# Patient Record
Sex: FEMALE | Race: BLACK OR AFRICAN AMERICAN | NOT HISPANIC OR LATINO | Employment: PART TIME | URBAN - METROPOLITAN AREA
[De-identification: names, ages, dates, MRNs, and addresses within clinical notes are randomized per-mention and may not be internally consistent; named-entity substitution may affect disease eponyms.]

---

## 2018-05-11 ENCOUNTER — TRANSFERRED RECORDS (OUTPATIENT)
Dept: HEALTH INFORMATION MANAGEMENT | Facility: CLINIC | Age: 38
End: 2018-05-11

## 2018-11-01 ENCOUNTER — TRANSFERRED RECORDS (OUTPATIENT)
Dept: HEALTH INFORMATION MANAGEMENT | Facility: CLINIC | Age: 38
End: 2018-11-01

## 2018-11-01 ENCOUNTER — OFFICE VISIT (OUTPATIENT)
Dept: FAMILY MEDICINE | Facility: CLINIC | Age: 38
End: 2018-11-01
Payer: OTHER MISCELLANEOUS

## 2018-11-01 VITALS
RESPIRATION RATE: 16 BRPM | WEIGHT: 137 LBS | BODY MASS INDEX: 23.39 KG/M2 | SYSTOLIC BLOOD PRESSURE: 101 MMHG | TEMPERATURE: 97.9 F | HEIGHT: 64 IN | DIASTOLIC BLOOD PRESSURE: 69 MMHG | HEART RATE: 70 BPM | OXYGEN SATURATION: 98 %

## 2018-11-01 DIAGNOSIS — E55.9 VITAMIN D DEFICIENCY: ICD-10-CM

## 2018-11-01 DIAGNOSIS — L70.0 CYSTIC ACNE: ICD-10-CM

## 2018-11-01 DIAGNOSIS — M54.50 CHRONIC RIGHT-SIDED LOW BACK PAIN WITHOUT SCIATICA: Primary | ICD-10-CM

## 2018-11-01 DIAGNOSIS — G89.29 CHRONIC RIGHT-SIDED LOW BACK PAIN WITHOUT SCIATICA: Primary | ICD-10-CM

## 2018-11-01 DIAGNOSIS — H57.89 REDNESS OF RIGHT EYE: ICD-10-CM

## 2018-11-01 LAB
ANION GAP SERPL CALCULATED.3IONS-SCNC: 11 MMOL/L (ref 5–18)
BUN SERPL-MCNC: 4 MG/DL (ref 8–22)
CALCIUM SERPL-MCNC: 9.4 MG/DL (ref 8.5–10.5)
CHLORIDE SERPL-SCNC: 108 MMOL/L (ref 98–107)
CO2 SERPL-SCNC: 22 MMOL/L (ref 22–31)
CREAT SERPL-MCNC: 0.61 MG/DL (ref 0.6–1.1)
GLUCOSE SERPL-MCNC: 85 MG/DL (ref 70–125)
POTASSIUM SERPL-SCNC: 3.8 MMOL/L (ref 3.5–5)
SODIUM SERPL-SCNC: 141 MMOL/L (ref 136–145)

## 2018-11-01 RX ORDER — ACETAMINOPHEN 500 MG
500-1000 TABLET ORAL EVERY 8 HOURS PRN
Qty: 100 TABLET | Refills: 11 | Status: ON HOLD | OUTPATIENT
Start: 2018-11-01 | End: 2020-01-02

## 2018-11-01 RX ORDER — HYDROCORTISONE 2.5 %
CREAM (GRAM) TOPICAL
COMMUNITY
Start: 2018-11-01 | End: 2023-02-01

## 2018-11-01 RX ORDER — CEFUROXIME AXETIL 250 MG/1
250 TABLET ORAL 2 TIMES DAILY
COMMUNITY
End: 2018-11-13

## 2018-11-01 NOTE — LETTER
RETURN TO WORK/SCHOOL FORM    11/1/2018    Re: Sagrario Meyer  1980      To Whom It May Concern:     Sagrario Meyer was seen in clinic today..  She may return to work with restrictions on 11/2/18          Restrictions:  limited to light duty - lifting and pulling no greater than 20 pounds for 2 weeks.      Dee Webb MD  11/1/2018 12:01 PM

## 2018-11-01 NOTE — PROGRESS NOTES
"    There are no exam notes on file for this visit.  Chief Complaint   Patient presents with     Back Pain     pain starts on R side of hip then would go across lower back. States that the pain comes and goes. Was in severe pain on Monday.     Blood pressure 101/69, pulse 70, temperature 97.9  F (36.6  C), temperature source Oral, resp. rate 16, height 5' 4\" (162.6 cm), weight 137 lb (62.1 kg), SpO2 98 %.                 HPI     Sagrario Meyer is a 37 year old  female with a PMH significant for:     Patient Active Problem List   Diagnosis     Cystic acne     Latent tuberculosis     Redness of right eye     Chronic right-sided low back pain without sciatica     Vitamin D deficiency     She presents as a new patient to clinic with main concern of right side pain.  She points to her right gluteal region as to where the pain is bothering her.  It has gotten worse over the last 2 weeks.  She does note that she had an injury to this area in 2012.  After that injury she did recover fully and has not had any pain since that time.  She has no new injury that she is aware of recently.  She does state that the pain got a little bit worse at her job when she lifted a heavy person on Monday.  It was the most severe on Monday.  She is clear that the injury did not occur at work but that work activities just made it worse.  She at times has a little pain down her thigh but this is very rare.  No weakness numbness tingling.  No problems with urination or stool.  No fevers or chills.  Pain is worse with movement does not wake her from sleep.  Also gets stiff when sitting at work and stretching helps.  She has tried some heat and ice already which helps.  Massage also helps.  She does not remember having any imaging her of her back or hips in the past.  She has not had physical therapy in the past.  She is worried about a kidney issue or appendix.    Getting  in January in [facility]". Wondering about future travel visit for " "this.    Would like a physical and blood draw.  She is fasting today.  As above.    Referred from Linh Jackman, she is her friend.  Previously seen at [facility].  She has not been seen there regularly.     Chronic skin issuses sees dermatology. Face has severe acne with scars.  Right foot has a severe rash with skin peeling.  Has two cream for face and one cream for right foot only. Sees dermatology consultants. Last saw in May.  Dr. Phill Kwan.    [facility]. Dr. Chaitanya Simpson, Danny Nguyen.  For right eye redness and pain, irritation that has been recurrent.  Just got eye drops that has not filled. Recheck in 11/9.     Came to [place] in [place], she is immigrant.  Won the [employer], not a refugee.  Speaks Macedonian and Oromo. Orthodoxy.  Lives alone.  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   2 brothers, one sister all in [place]. Mother still living in [place] as well.  Father's passed away and she never knew him.      Problem list/ PMH updated and review in detail   PAST SURGICAL HISTORY: none  Family History   Problem Relation Age of Onset     KIDNEY DISEASE Mother      No Known Problems Father      No Known Problems Sister      No Known Problems Brother      Hypertension Maternal Grandmother      No Known Problems Brother      Coronary Artery Disease No family hx of      Other Cancer No family hx of      Diabetes No family hx of      As above, otherwise negative for 12 point ROS.              Physical Exam:     Vitals:    11/01/18 1109   BP: 101/69   Pulse: 70   Resp: 16   Temp: 97.9  F (36.6  C)   TempSrc: Oral   SpO2: 98%   Weight: 137 lb (62.1 kg)   Height: 5' 4\" (162.6 cm)     Body mass index is 23.52 kg/(m^2).    Exam:  Constitutional: healthy, alert and no distress  Eyes: PERRLA, EOMI, conjunctiva very erythematous on the right. No discharge.  Cardiovascular:RRR. No murmurs, clicks gallops or rub  Respiratory:  normal respiratory rate " and rhythm, lungs clear to auscultation. No wheezes or crackles.  Abdomen: +BS, soft, nontender, nondistended. No HSM.  Extremities: No C/C/E in BLE. 2+DP pulses.  Joint exam without erythema, effusion and full ROM throughout of hips and knees.  Back: Full ROM. Tender to palpation in right lumbar paraspinal muscles and gluteus muscles to deep palpation.  Negative straight leg raises bilaterally.  Normal sensation to light touch.  5/5 strength in bilateral lower extremities. Neg NICOLA.  Skin: Severe acne on the face, has a lot of scarring but inflammation appears quiet and well controlled at this time.  Psychiatric: mentation appears normal and affect normal/bright      Assessment and Plan     Sagrario was seen today for back pain.    Diagnoses and all orders for this visit:    Chronic right-sided low back pain without sciatica: Chronic issues for a few years with a recent flare.  Discussed supportive cares and exercises to try.  Consider formal PT if not improving.  Gave work note to limit her lifting and pulling responsibilities while healing. Reassured that not a kidney or appendix issue.  -     Basic Metabolic Panel ([facility])  -     acetaminophen (TYLENOL) 500 MG tablet; Take 1-2 tablets (500-1,000 mg) by mouth every 8 hours as needed for mild pain    Redness of right eye: Seeing ophthalmology.  NATHALIE signed and will add medications to list at next visit.    Cystic acne: Severe, with scarring on face.  Under dermatology care, but has not seen them since the spring.  Unsure of all medications. NATHALIE signed. Also has chronic rash on right foot, unclear diagnosis.  Will review derm records.    Vitamin D deficiency: Very low last year. Recheck records.  -     Vitamin D 25-Hydroxy ([facility])      Patient Instructions   Rest, heat, ice and stretching and strengthening exercises.    Get up at work and stretch periodically.    Do exercises 2 times a day.       Options for treatment and/or follow-up care were reviewed with the  patient. Sagrario Rivasdulce was engaged and actively involved in the decision making process. She verbalized understanding of the options discussed and was satisfied with the final plan.    Dee Webb MD

## 2018-11-01 NOTE — PATIENT INSTRUCTIONS
Rest, heat, ice and stretching and strengthening exercises.    Get up at work and stretch periodically.    Do exercises 2 times a day.

## 2018-11-01 NOTE — MR AVS SNAPSHOT
After Visit Summary   2018    Sagrario Meyer    MRN: [id number]           Patient Information     Date Of Birth          1980        Visit Information        Provider Department      2018 11:00 AM Dee Webb MD [facility]        Today's Diagnoses     Chronic right-sided low back pain without sciatica    -  1    Redness of right eye        Cystic acne        Vitamin D deficiency          Care Instructions    Rest, heat, ice and stretching and strengthening exercises.    Get up at work and stretch periodically.    Do exercises 2 times a day.          Follow-ups after your visit        Follow-up notes from your care team     Return in about 2 weeks (around 11/15/2018) for Complete physical and recheck back.      Who to contact     Please call your clinic at [phone] to:    Ask questions about your health    Make or cancel appointments    Discuss your medicines    Learn about your test results    Speak to your doctor            Additional Information About Your Visit        MyChart Information     Becual is an electronic gateway that provides easy, online access to your medical records. With Becual, you can request a clinic appointment, read your test results, renew a prescription or communicate with your care team.     To sign up for [email or web address] visit the website at [email or web address]   You will be asked to enter the access code listed below, as well as some personal information. Please follow the directions to create your username and password.     Your access code is: [email or web address]  Expires: 2019 12:06 PM     Your access code will  in 90 days. If you need help or a new code, please contact your [facility] or call [phone] for assistance.        Care EveryWhere ID     This is your Care EveryWhere ID. This could be used by other organizations to access your [facility] medical records  [id number]        Your Vitals Were      Pulse Temperature Respirations Last Period Pulse Oximetry       70 97.9  F (36.6  C) (Oral) 16 (LMP Unknown) 98%        Blood Pressure from Last 3 Encounters:   11/01/18 101/69    Weight from Last 3 Encounters:   11/01/18 137 lb (62.1 kg)              We Performed the Following     Basic Metabolic Panel ([facility])     Vitamin D 25-Hydroxy ([facility])          Today's Medication Changes          These changes are accurate as of 11/1/18 12:06 PM.  If you have any questions, ask your nurse or doctor.               Start taking these medicines.        Dose/Directions    acetaminophen 500 MG tablet   Commonly known as:  TYLENOL   Used for:  Chronic right-sided low back pain without sciatica   Started by:  Dee Webb MD        Dose:  500-1000 mg   Take 1-2 tablets (500-1,000 mg) by mouth every 8 hours as needed for mild pain   Quantity:  100 tablet   Refills:  11            Where to get your medicines      These medications were sent to [facility]/pharmacy #[id number] - [place], [place] - [address] AT [facility]  [address] [place] [address]    Hours:  24-hours Phone:  [phone]     acetaminophen 500 MG tablet                Primary Care Provider Office Phone # Fax #    Dee Webb [phone]       [address] [place] [address]        Equal Access to Services     BRANDON FRANKS AH: Hadii ulises mina hadasho Soomaali, waaxda luqadaha, qaybta kaalmada adeegyada, daisy franklin haybyron العراقي. So [facility] [place] [phone].    ATENCIÓN: Si habla español, tiene a carbone disposición servicios gratuitos de asistencia lingüística. Adelsoame al [phone].    We comply with applicable federal civil rights laws and [place] laws. We do not discriminate on the basis of race, color, national origin, age, disability, sex, sexual orientation, or gender identity.            Thank you!     Thank you for choosing [facility]  for your care. Our goal is always to provide you with excellent  care. Hearing back from our patients is one way we can continue to improve our services. Please take a few minutes to complete the written survey that you may receive in the mail after your visit with us. Thank you!             Your Updated Medication List - Protect others around you: Learn how to safely use, store and throw away your medicines at www.disposemymeds.org.          This list is accurate as of 11/1/18 12:06 PM.  Always use your most recent med list.                   Brand Name Dispense Instructions for use Diagnosis    acetaminophen 500 MG tablet    TYLENOL    100 tablet    Take 1-2 tablets (500-1,000 mg) by mouth every 8 hours as needed for mild pain    Chronic right-sided low back pain without sciatica       cefuroxime 250 MG tablet    CEFTIN     Take 250 mg by mouth 2 times daily        hydrocortisone 2.5 % cream      Apply to face 2 to 3 times a week. From Dermatology.

## 2018-11-02 LAB — 25(OH)D3 SERPL-MCNC: 10.6 NG/ML (ref 30–80)

## 2018-11-13 ENCOUNTER — OFFICE VISIT (OUTPATIENT)
Dept: FAMILY MEDICINE | Facility: CLINIC | Age: 38
End: 2018-11-13
Payer: COMMERCIAL

## 2018-11-13 VITALS
TEMPERATURE: 97.9 F | RESPIRATION RATE: 16 BRPM | HEIGHT: 64 IN | BODY MASS INDEX: 23.49 KG/M2 | SYSTOLIC BLOOD PRESSURE: 116 MMHG | WEIGHT: 137.6 LBS | DIASTOLIC BLOOD PRESSURE: 77 MMHG | HEART RATE: 72 BPM

## 2018-11-13 DIAGNOSIS — E55.9 VITAMIN D DEFICIENCY: ICD-10-CM

## 2018-11-13 DIAGNOSIS — Z00.00 ROUTINE GENERAL MEDICAL EXAMINATION AT A HEALTH CARE FACILITY: Primary | ICD-10-CM

## 2018-11-13 LAB
CHOLEST SERPL-MCNC: 114 MG/DL (ref 0–200)
CHOLEST/HDLC SERPL: 2.6 {RATIO} (ref 0–5)
HDLC SERPL-MCNC: 44.1 MG/DL
HIV 1+2 AB+HIV1 P24 AG SERPL QL IA: NEGATIVE
LDLC SERPL CALC-MCNC: 56 MG/DL (ref 0–129)
TRIGL SERPL-MCNC: 67.8 MG/DL (ref 0–150)
VLDL CHOLESTEROL: 13.6 MG/DL (ref 7–32)

## 2018-11-13 RX ORDER — CHOLECALCIFEROL (VITAMIN D3) 50 MCG
2000 TABLET ORAL DAILY
Qty: 100 TABLET | Refills: 3 | Status: SHIPPED | OUTPATIENT
Start: 2018-11-13 | End: 2019-01-10

## 2018-11-13 NOTE — LETTER
November 16, 2018      Sagrario Meyer  [address]   [place] [address]        Dear Sagrario,    Please see below for your test results.    Resulted Orders   HIV Ag/Ab Screen [place] ([facility])   Result Value Ref Range    HIV Antigen/Antibody Negative Negative    Narrative    Test performed by:  [facility]  [address] [place] [address]  Method is Abbott HIV Ag/Ab for the detection of HIV p24 antigen, HIV-1   antibodies and HIV-2 antibodies.   Lipid Panel ([facility])   Result Value Ref Range    Cholesterol 114.0 0.0 - 200.0 mg/dL    Cholesterol/HDL Ratio 2.6 0.0 - 5.0    HDL Cholesterol 44.1 >40.0 mg/dL    LDL Cholesterol Calculated 56 0 - 129 mg/dL    Triglycerides 67.8 0.0 - 150.0 mg/dL    VLDL Cholesterol 13.6 7.0 - 32.0 mg/dL     Negative HIV test and cholesterol is very low and normal.      If you have any questions, please call the clinic to make an appointment.    Sincerely,    Dee Webb MD

## 2018-11-13 NOTE — PROGRESS NOTES
Female Physical Note    Concerns today: No special concerns today.     Back and buttock pain still there and will follow up for that in the future.        ROS:  CONSTITUTIONAL: no fatigue, no unexpected change in weight  SKIN: no worrisome rashes, no worrisome moles, no worrisome lesions  EYES: no acute vision problems or changes  ENT: no ear problems, no mouth problems, no throat problems  RESP: no significant cough, no shortness of breath  BREAST: no masses, no tenderness, no discharge  CV: no chest pain, no palpitations, no new or worsening peripheral edema  GI: no nausea, no vomiting, no constipation, no diarrhea  : no frequency, no dysuria, no hematuria  MS: no claudication, no myalgias, no joint aches  NEURO: no weakness, no dizziness, no syncope, no headaches  ENDOCRINE: no temperature intolerance, no skin/hair changes  HEME: no easy bruising, no bleeding problems  PSYCHIATRIC: NEGATIVE for changes in mood or affect    Sexually Active: No  Sexual concerns: No   Contraception:not needed  None   No LMP recorded (lmp unknown). Regular periods.  STD History: Neg  Last Pap Smear Date: Never had Abnormal Pap History: None    Patient Active Problem List   Diagnosis     Cystic acne     Latent tuberculosis     Redness of right eye     Chronic right-sided low back pain without sciatica     Vitamin D deficiency       Current Outpatient Prescriptions   Medication Sig Dispense Refill     acetaminophen (TYLENOL) 500 MG tablet Take 1-2 tablets (500-1,000 mg) by mouth every 8 hours as needed for mild pain 100 tablet 11     cefuroxime (CEFTIN) 250 MG tablet Take 250 mg by mouth 2 times daily       hydrocortisone 2.5 % cream Apply to face 2 to 3 times a week. From Dermatology.         No past medical history on file.     Family History     Problem (# of Occurrences) Relation (Name,Age of Onset)    Hypertension (1) Maternal Grandmother    KIDNEY DISEASE (1) Mother    No Known Problems (4) Father, Sister, Brother, Brother  "      Negative family history of: Coronary Artery Disease, Other Cancer, Diabetes          Problem List Medication List and Allergy List were reviewed.    Patient is an established patient of this clinic..    Social History   Substance Use Topics     Smoking status: Never Smoker     Smokeless tobacco: Never Used     Alcohol use Yes      Comment: occ wine     Single  Children ? no    Has anyone hurt you physically, for example by pushing, hitting, slapping or kicking you or forcing you to have sex? Denies  Do you feel threatened or controlled by a partner, ex-partner or anyone in your life? Denies    RISK BEHAVIORS AND HEALTHY HABITS:  Tobacco Use/Smoking: None  Illicit Drug Use: None  Do you use alcohol? No  Diet (5-7 servings of fruits/veg daily): Yes   Exercise (30 min accumulated most days):Yes  Dental Care: Yes   Calcium 1500 mg/d:  No  Seat Belt Use: Yes     Cholesterol Level (>46 yo or at risk):  Date done normal in 2014, recheck next year, ASA use (>3% risk in 5 y):  Testing not indicated , Pap/HPV cotest every 5 years for women 30-65   Recommended and patient declined testing. and HIV screening:  Recommended and patient accepted testing.    ASCVD risk 0.3%    Immunization History   Administered Date(s) Administered     Influenza (IIV3) PF 10/25/2018    Reviewed Immunization Record Today    EXAMINATION:   /77  Pulse 72  Temp 97.9  F (36.6  C)  Resp 16  Ht 5' 3.75\" (161.9 cm)  Wt 137 lb 9.6 oz (62.4 kg)  LMP  (LMP Unknown)  BMI 23.8 kg/m2  GENERAL: healthy, alert and no distress  EYES: Eyes grossly normal to inspection, extraocular movements - intact, and PERRL  HENT: ear canals- normal; TMs- normal; Nose- normal; Mouth- no ulcers, no lesions  NECK: no tenderness, no adenopathy, no asymmetry, no masses, no stiffness; thyroid- normal to palpation  RESP: lungs clear to auscultation - no rales, no rhonchi, no wheezes  BREAST: no masses, no tenderness, no nipple discharge, no palpable axillary masses " or adenopathy  CV: regular rates and rhythm, normal S1 S2, no S3 or S4 and no murmur, no click or rub -  ABDOMEN: soft, no tenderness, no  hepatosplenomegaly, no masses, normal bowel sounds  MS: extremities- no gross deformities noted, no edema  SKIN: no suspicious lesions, no rashes  NEURO: strength and tone- normal, sensory exam- grossly normal, mentation- intact, speech- normal, reflexes- symmetric  BACK: no CVA tenderness, no paralumbar tenderness  LYMPHATICS: ant. cervical- normal, post. cervical- normal, axillary- normal, supraclavicular- normal, inguinal- normal    ASSESSMENT/PLAN:  Sagrario was seen today for physical and back pain.    Diagnoses and all orders for this visit:    Routine general medical examination at a health care facility: screening tests ordered.  Discussed need for pap smear.  She states she will think about it and may have in the future. Otherwise up to date.  -     HIV Ag/Ab Screen [facility])  -     Lipid Panel ([place])    Vitamin D deficiency: history of vitamin D def.  Will restart supplements and recheck in 6 weeks. Adjust dose as needed.  -     Cholecalciferol (VITAMIN D) 2000 units tablet; Take 2,000 Units by mouth daily      Return in about 3 weeks (around 12/4/2018) for Recheck back pain (Worker's Comp), 3 months for vitamin D recheck.

## 2018-11-13 NOTE — PATIENT INSTRUCTIONS
Preventive Health Recommendations  Female Ages 26 - 39  Yearly exam:   See your health care provider every year in order to    Review health changes.     Discuss preventive care.      Review your medicines if you your doctor has prescribed any.    Until age 30: Get a Pap test every three years (more often if you have had an abnormal result).    After age 30: Talk to your doctor about whether you should have a Pap test every 3 years or have a Pap test with HPV screening every 5 years.   You do not need a Pap test if your uterus was removed (hysterectomy) and you have not had cancer.  You should be tested each year for STDs (sexually transmitted diseases), if you're at risk.   Talk to your provider about how often to have your cholesterol checked.  If you are at risk for diabetes, you should have a diabetes test (fasting glucose).  Shots: Get a flu shot each year. Get a tetanus shot every 10 years.   Nutrition:     Eat at least 5 servings of fruits and vegetables each day.    Eat whole-grain bread, whole-wheat pasta and brown rice instead of white grains and rice.    Get adequate Calcium 1500mg daily. and Vitamin D,     Lifestyle    Exercise at least 150 minutes a week (30 minutes a day, 5 days of the week). This will help you control your weight and prevent disease.    Limit alcohol to one drink per day.    No smoking.     Wear sunscreen to prevent skin cancer.    See your dentist every six months for an exam and cleaning.    Foods and drinks with calcium  Food Calcium, milligrams   Milk (skim, 2 percent, or whole, 8 oz [240 mL]) 300   Yogurt (6 oz [168 g]) 250   Orange juice (with calcium, 8 oz [240 mL]) 300   Tofu with calcium (1/2 cup [113 g]) 435   Cheese (1 oz [28 g]) 195 to 335 (hard cheese = higher calcium)   Cottage cheese (1/2 cup [113 g]) 130   Ice cream or frozen yogurt (1/2 cup [113 g]) 100   Soy milk (8 oz [240 mL]) 300   Beans (1/2 cup cooked [113 g]) 60 to 80   Dark, leafy green vegetables (1/2 cup  cooked [113 g]) 50 to 135   Almonds (24 whole) 70   Orange (1 medium) 60

## 2018-11-13 NOTE — MR AVS SNAPSHOT
After Visit Summary   11/13/2018    Sagrario Meyer    MRN: [id number]           Patient Information     Date Of Birth          1980        Visit Information        Provider Department      11/13/2018 2:30 PM Dee Webb MD [facility]        Today's Diagnoses     Routine general medical examination at a health care facility    -  1    Vitamin D deficiency          Care Instructions      Preventive Health Recommendations  Female Ages 26 - 39  Yearly exam:   See your health care provider every year in order to    Review health changes.     Discuss preventive care.      Review your medicines if you your doctor has prescribed any.    Until age 30: Get a Pap test every three years (more often if you have had an abnormal result).    After age 30: Talk to your doctor about whether you should have a Pap test every 3 years or have a Pap test with HPV screening every 5 years.   You do not need a Pap test if your uterus was removed (hysterectomy) and you have not had cancer.  You should be tested each year for STDs (sexually transmitted diseases), if you're at risk.   Talk to your provider about how often to have your cholesterol checked.  If you are at risk for diabetes, you should have a diabetes test (fasting glucose).  Shots: Get a flu shot each year. Get a tetanus shot every 10 years.   Nutrition:     Eat at least 5 servings of fruits and vegetables each day.    Eat whole-grain bread, whole-wheat pasta and brown rice instead of white grains and rice.    Get adequate Calcium 1500mg daily. and Vitamin D,     Lifestyle    Exercise at least 150 minutes a week (30 minutes a day, 5 days of the week). This will help you control your weight and prevent disease.    Limit alcohol to one drink per day.    No smoking.     Wear sunscreen to prevent skin cancer.    See your dentist every six months for an exam and cleaning.    Foods and drinks with calcium  Food Calcium, milligrams   Milk (skim, 2 percent, or  whole, 8 oz [240 mL]) 300   Yogurt (6 oz [168 g]) 250   Orange juice (with calcium, 8 oz [240 mL]) 300   Tofu with calcium (1/2 cup [113 g]) 435   Cheese (1 oz [28 g]) 195 to 335 (hard cheese = higher calcium)   Cottage cheese (1/2 cup [113 g]) 130   Ice cream or frozen yogurt (1/2 cup [113 g]) 100   Soy milk (8 oz [240 mL]) 300   Beans (1/2 cup cooked [113 g]) 60 to 80   Dark, leafy green vegetables (1/2 cup cooked [113 g]) 50 to 135   Almonds (24 whole) 70   Orange (1 medium) 60               Follow-ups after your visit        Follow-up notes from your care team     Return in about 3 weeks (around 2018) for Recheck back pain (Worker's Comp), 3 months for vitamin D recheck.      Who to contact     Please call your clinic at [phone] to:    Ask questions about your health    Make or cancel appointments    Discuss your medicines    Learn about your test results    Speak to your doctor            Additional Information About Your Visit        [facility] [employer] [facility] Information     [facility] is an electronic gateway that provides easy, online access to your medical records. With [facility], you can request a clinic appointment, read your test results, renew a prescription or communicate with your care team.     To sign up for [facility] visit the website at [email or web address]   You will be asked to enter the access code listed below, as well as some personal information. Please follow the directions to create your username and password.     Your access code is: [email or web address]  Expires: 2019 11:06 AM     Your access code will  in 90 days. If you need help or a new code, please contact your [facility] or call [phone] for assistance.        Care EveryWhere ID     This is your Care EveryWhere ID. This could be used by other organizations to access your [facility] medical records  [id number]        Your Vitals Were     Pulse Temperature Respirations Height Last Period BMI (Body Mass  "Index)    72 97.9  F (36.6  C) 16 5' 3.75\" (161.9 cm) (LMP Unknown) 23.8 kg/m2       Blood Pressure from Last 3 Encounters:   11/13/18 116/77   11/01/18 101/69    Weight from Last 3 Encounters:   11/13/18 137 lb 9.6 oz (62.4 kg)   11/01/18 137 lb (62.1 kg)              We Performed the Following     HIV Ag/Ab Screen [place] ([facility])     Lipid Panel ([place])          Today's Medication Changes          These changes are accurate as of 11/13/18  3:35 PM.  If you have any questions, ask your nurse or doctor.               Start taking these medicines.        Dose/Directions    vitamin D 2000 units tablet   Used for:  Vitamin D deficiency   Started by:  Dee Webb MD        Dose:  2000 Units   Take 2,000 Units by mouth daily   Quantity:  100 tablet   Refills:  3         Stop taking these medicines if you haven't already. Please contact your care team if you have questions.     cefuroxime 250 MG tablet   Commonly known as:  CEFTIN   Stopped by:  Dee Webb MD                Where to get your medicines      These medications were sent to [facility]/pharmacy #[id number] - [place], [place] - [facility] [address] [place] [address] [place] [address]    Hours:  24-hours Phone:  [phone]     vitamin D 2000 units tablet                Primary Care Provider Office Phone # Fax #    Dee Webb [phone]       [address] [place] [address]        Equal Access to Services     [facility] Hospital[facility]AREN AH: Hadii aad ku hadasho Soomaali, waaxda luqadaha, qaybta kaalmada adeegyada, waxay rigoberto العراقي. So [facility] [phone].    ATENCIÓN: Si habla español, tiene a carbone disposición servicios gratuitos de asistencia lingüística. Llame al [phone].    We comply with applicable federal civil rights laws and [place] laws. We do not discriminate on the basis of race, color, national origin, age, disability, sex, sexual orientation, or gender identity.            Thank " you!     Thank you for choosing [facility]  for your care. Our goal is always to provide you with excellent care. Hearing back from our patients is one way we can continue to improve our services. Please take a few minutes to complete the written survey that you may receive in the mail after your visit with us. Thank you!             Your Updated Medication List - Protect others around you: Learn how to safely use, store and throw away your medicines at www.disposemymeds.org.          This list is accurate as of 11/13/18  3:35 PM.  Always use your most recent med list.                   Brand Name Dispense Instructions for use Diagnosis    acetaminophen 500 MG tablet    TYLENOL    100 tablet    Take 1-2 tablets (500-1,000 mg) by mouth every 8 hours as needed for mild pain    Chronic right-sided low back pain without sciatica       hydrocortisone 2.5 % cream      Apply to face 2 to 3 times a week. From Dermatology.        vitamin D 2000 units tablet     100 tablet    Take 2,000 Units by mouth daily    Vitamin D deficiency

## 2018-11-14 ENCOUNTER — HEALTH MAINTENANCE LETTER (OUTPATIENT)
Age: 38
End: 2018-11-14

## 2018-12-12 ENCOUNTER — DOCUMENTATION ONLY (OUTPATIENT)
Dept: FAMILY MEDICINE | Facility: CLINIC | Age: 38
End: 2018-12-12

## 2018-12-12 ENCOUNTER — OFFICE VISIT (OUTPATIENT)
Dept: FAMILY MEDICINE | Facility: CLINIC | Age: 38
End: 2018-12-12
Payer: OTHER MISCELLANEOUS

## 2018-12-12 VITALS
DIASTOLIC BLOOD PRESSURE: 76 MMHG | WEIGHT: 137.6 LBS | BODY MASS INDEX: 23.8 KG/M2 | SYSTOLIC BLOOD PRESSURE: 114 MMHG | RESPIRATION RATE: 16 BRPM | OXYGEN SATURATION: 97 % | TEMPERATURE: 98.6 F | HEART RATE: 73 BPM

## 2018-12-12 DIAGNOSIS — M54.50 CHRONIC RIGHT-SIDED LOW BACK PAIN WITHOUT SCIATICA: Primary | ICD-10-CM

## 2018-12-12 DIAGNOSIS — G89.29 CHRONIC RIGHT-SIDED LOW BACK PAIN WITHOUT SCIATICA: Primary | ICD-10-CM

## 2018-12-12 RX ORDER — PRENATAL VIT/IRON FUM/FOLIC AC 27MG-0.8MG
1 TABLET ORAL DAILY
Qty: 100 TABLET | Refills: 3 | Status: SHIPPED | OUTPATIENT
Start: 2018-12-12 | End: 2019-01-10

## 2018-12-12 NOTE — PATIENT INSTRUCTIONS
Make visit for OMT with Dr. Vaughn or Dr. Hebert as soon as able.    Continue home exercise, ice and tylenol as needed.

## 2018-12-12 NOTE — PROGRESS NOTES
There are no exam notes on file for this visit.  Chief Complaint   Patient presents with     Work Comp     Pt is here for her continued care of her work comp back injury that occured on 10/31/18.     Medication Reconciliation     Complete.      Blood pressure 114/76, pulse 73, temperature 98.6  F (37  C), temperature source Oral, resp. rate 16, weight 62.4 kg (137 lb 9.6 oz), SpO2 97 %.                 HPI     Sagrario Meyer is a 38 year old  female with a PMH significant for:     Patient Active Problem List   Diagnosis     Cystic acne     Latent tuberculosis     Redness of right eye     Chronic right-sided low back pain without sciatica     Vitamin D deficiency     She presents with  Follow up back pain.  Right side with some radiation into the buttock.  Sometimes has to do more lifting than restrictions due to being short of staff at work. 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 Difficult to avoid lifting.     She is doing ice a lot at work and it is helping and after work too.  She is doing home exercises and it is helping.  She states the home exercises are the same as past PT and she doesn't want to do PT again.  Overall pain is better but still bothers her some everyday.  No weakness, numbness or tingling.  No trouble with bowel or bladder function.      PMH, Medications and Allergies were reviewed and updated as needed.                Physical Exam:     Vitals:    12/12/18 1506   BP: 114/76   BP Location: Left arm   Patient Position: Sitting   Cuff Size: Adult Regular   Pulse: 73   Resp: 16   Temp: 98.6  F (37  C)   TempSrc: Oral   SpO2: 97%   Weight: 62.4 kg (137 lb 9.6 oz)     Body mass index is 23.8 kg/m .    Exam:  Constitutional: healthy, alert and no distress  Back: Decreased forward flexion.  Pain to palpation over right lumbar paraspinal muscles, gluteus muscles and SI joint.  No pain on the left.  5/5 strength throughout. Normal sensation  throughout.  Negative straight leg raise. Normal gait.  Psychiatric: mentation appears normal and affect normal/bright      Assessment and Plan     Sagrario was seen today for work comp and medication reconciliation.    Diagnoses and all orders for this visit:    Chronic right-sided low back pain without sciatica: Right lower back strain.  Improving but still giving some persistent pain.  Not able to avoid lifting at work.  Increased lifting restriction to 50#.  Needs to use proper lifting technique. Recommend a back support to remind her of proper technique.  Should ask work about this. Will try OMT as it may help with rehab.      Patient Instructions   Make visit for OMT with Dr. Vaughn or Dr. Hebert as soon as able.    Continue home exercise, ice and tylenol as needed.      Return in about 3 weeks (around 1/2/2019) for Back pain, sooner if worse.     Options for treatment and/or follow-up care were reviewed with the patient. Sagrario Meyer was engaged and actively involved in the decision making process. She verbalized understanding of the options discussed and was satisfied with the final plan.    Dee Webb MD

## 2018-12-12 NOTE — PROGRESS NOTES
Discussed results with patient today and recommended that she continue vitamin D daily and we should recheck level in 3 months or so.      She is getting  in [place] in January and plans to start a family.  Started prenatal vitamins today for this.    We will follow up both issues after she returns from [place] if needed.  Dee Webb MD

## 2018-12-12 NOTE — LETTER
RETURN TO WORK/SCHOOL FORM    12/12/2018    Re: Sagrario Meyer  1980      To Whom It May Concern:     Sagrario Meyer was seen in clinic today..  She may return to work with restrictions on 12/12/18.          Restrictions: limited to light duty - lifting and pulling no greater than 50 pounds until cleared by physician. I recommend that she wear a back support belt to help with correct lifting technique at work.     Next visit 3 to 4 weeks.      Dee Webb MD  12/12/2018 3:15 PM

## 2019-01-10 ENCOUNTER — OFFICE VISIT (OUTPATIENT)
Dept: FAMILY MEDICINE | Facility: CLINIC | Age: 39
End: 2019-01-10
Payer: COMMERCIAL

## 2019-01-10 VITALS
SYSTOLIC BLOOD PRESSURE: 121 MMHG | BODY MASS INDEX: 23.35 KG/M2 | TEMPERATURE: 98.3 F | RESPIRATION RATE: 16 BRPM | WEIGHT: 135 LBS | HEART RATE: 81 BPM | OXYGEN SATURATION: 100 % | DIASTOLIC BLOOD PRESSURE: 76 MMHG

## 2019-01-10 DIAGNOSIS — Z31.69 ENCOUNTER FOR PRECONCEPTION CONSULTATION: ICD-10-CM

## 2019-01-10 DIAGNOSIS — E55.9 VITAMIN D DEFICIENCY: ICD-10-CM

## 2019-01-10 DIAGNOSIS — Z71.84 TRAVEL ADVICE ENCOUNTER: Primary | ICD-10-CM

## 2019-01-10 RX ORDER — ATOVAQUONE AND PROGUANIL HYDROCHLORIDE 250; 100 MG/1; MG/1
1 TABLET, FILM COATED ORAL DAILY
Qty: 100 TABLET | Refills: 0 | Status: SHIPPED | OUTPATIENT
Start: 2019-01-10 | End: 2019-05-06

## 2019-01-10 RX ORDER — PRENATAL VIT/IRON FUM/FOLIC AC 27MG-0.8MG
1 TABLET ORAL DAILY
Qty: 100 TABLET | Refills: 3 | Status: SHIPPED | OUTPATIENT
Start: 2019-01-10 | End: 2019-05-06

## 2019-01-10 RX ORDER — CHOLECALCIFEROL (VITAMIN D3) 50 MCG
2000 TABLET ORAL DAILY
Qty: 100 TABLET | Refills: 3 | Status: SHIPPED | OUTPATIENT
Start: 2019-01-10 | End: 2019-05-06

## 2019-01-10 RX ORDER — LOPERAMIDE HYDROCHLORIDE 2 MG/1
TABLET ORAL
Qty: 20 TABLET | Refills: 0 | Status: SHIPPED | OUTPATIENT
Start: 2019-01-10 | End: 2019-05-06

## 2019-01-10 NOTE — PATIENT INSTRUCTIONS
We will check your labs today.    Come back Monday for your shots.  We will know which ones to give based on the labs.

## 2019-01-10 NOTE — PROGRESS NOTES
[facility] Travel Visit         Sagrario Meyer is a 38 year old female who presents for a pre-travel assessment visit.  She will be traveling to:    Destination(s):  Destination 1  hospitals  Date of arrival 1/15/19  Date of departure 4/14/19    Reason for travel: Getting  and visiting family.    Sagrario Meyer has completed the travel questionnaire and it is reviewed: NO   Are there special circumstances which place this traveler at higher risk (List if 'yes')?: NO     (For women only)  Is patient currently pregnant or might become pregnant within three months of this trip? YES  Is she breastfeeding? NO     History reviewed. No pertinent past medical history.      Current Outpatient Medications on File Prior to Visit:  acetaminophen (TYLENOL) 500 MG tablet Take 1-2 tablets (500-1,000 mg) by mouth every 8 hours as needed for mild pain   hydrocortisone 2.5 % cream Apply to face 2 to 3 times a week. From Dermatology.     No current facility-administered medications on file prior to visit.         Allergies   Allergen Reactions     Peanuts [Nuts]        Immunizations, travel specific:  -- Yellow fever: Not Required, Not Recommended  -- Hep A: Immunity status unknown  -- Hep B: UTD with immunization   -- Typhoid (IM: booster every 2 years; PO: booster every 5 years): Known to be not immune, not immunized  -- Rabies  (Data on the need for and timing of additional booster doses are not available): Known to be not immune, not immunized  -- Meningococcal meningitis Known to be not immune, not immunized   -- Indonesian encephalitis (Data on the need for and timing of additional booster doses are not available):Known to be not immune, not immunized    Immunizations, routine adult:  -- Influenza UTD with immunization   -- Polio: Known to be not immune, not immunized  -- Diphtheria, Tetanus & Pertussis (DTaP): UTD with immunization   -- Measles/ Mumps/ Rubella: Immunity status unknown  -- Varicella: Immunity status  unknown  -- Pneumococcal (PPSV23 (Pneumovax)): Immunity status unknown  -- Pneumococcal (PCV13 (Prevnar)): Immunity status unknown    Malaria prophylaxis:  Recommended (refer to [facility] for destination-specific recommendation) YES       Review of Systems:     CONSTITUTIONAL: NEGATIVE for fever, chills, change in weight  RESP: NEGATIVE for significant cough or SOB  CV: NEGATIVE for chest pain, palpitations or peripheral edema          Objective:     /76   Pulse 81   Temp 98.3  F (36.8  C) (Oral)   Resp 16   Wt 61.2 kg (135 lb)   LMP 01/03/2019 (Approximate)   SpO2 100%   BMI 23.35 kg/m    Body mass index is 23.35 kg/m .    GENERAL: healthy, alert, well nourished, well hydrated, no distress         Assessment and Plan     Based on patient's past history, review of immunization and immunity status, planned itinerary and current recommendations, the following are recommended:    Yellow fever vaccine  Hep A vaccine: Evaluate for immunity  Typhoid vaccine   Meningococcal meningitis vaccine  MMR vaccine: evaluate for immunity  Varicella vaccine: evaluate for immunity  Malaria: Malarone: Begin 1-2 days before travel. Take daily while in the malarious area and for 7 days after returning.  Traveler's diarrhea treatment prescribed.    Patient is given a copy of the [facility] traveller report as well as destination-specific recommendations on sun protection, avoiding insect bites and travel safety.    Sagrario was seen today for recheck medication.    Diagnoses and all orders for this visit:    Travel advice encounter  -     atovaquone-proguanil (MALARONE) 250-100 MG tablet; Take 1 tablet by mouth daily Start 2 days before travel and continue 7 days after return.  -     loperamide (IMODIUM A-D) 2 MG tablet; Take 2 tabs (4 mg) after first loose stool, and then take one tab (2 mg) after each diarrheal stool.  Max of 4 tabs (8 mg) per day.  -     ADMIN VACCINE, INITIAL  -     ADMIN VACCINE, EACH ADDITIONAL  -     TYPHOID  VACCINE, IM  -     HEPATITIS A VACCINE ADULT IM  -     Hepatitis A Immune Status ([facility])  -     Mumps Immune Status [place] ([facility])  -     Rubella  IgG ([facility])  -     Rubeola Immune Status ([facility])  -     Myrna Zoster Imm Status Suni ([facility])    Vitamin D deficiency: refilled for 3 month supply.  -     vitamin D3 (CHOLECALCIFEROL) 2000 units tablet; Take 1 tablet by mouth daily    Encounter for preconception consultation:  Refilled for 3 month supply.  -     Prenatal Vit-Fe Fumarate-FA (PRENATAL MULTIVITAMIN W/IRON) 27-0.8 MG tablet; Take 1 tablet by mouth daily        Total of 25 minutes was spent in face to face contact with patient with > 50% in counseling and coordination of care.  Options for treatment and/or follow-up care were reviewed with the patient. Sagrario Meyer was engaged and actively involved in the decision making process. She verbalized understanding of the options discussed and was satisfied with the final plan.      Dee Webb MD

## 2019-01-11 LAB
HAV IGG SER QL IA: POSITIVE
MUMPS IMMUNE STATUS: POSITIVE
RUBEOLA IMMUNE STATUS: POSITIVE
RUBV IGG SERPL QL IA: POSITIVE

## 2019-01-14 LAB — VZV IGG SER QL IF: POSITIVE

## 2019-01-14 NOTE — RESULT ENCOUNTER NOTE
Called patient and discussed results and that she will still need her Menactra immunizations for her travel but up to date on others.  Discussed that yellow fever shot has a shortage and can only be done at certain clinics.  We will have to have her do that before next trip. She agrees with this.

## 2019-05-06 ENCOUNTER — OFFICE VISIT (OUTPATIENT)
Dept: FAMILY MEDICINE | Facility: CLINIC | Age: 39
End: 2019-05-06
Payer: COMMERCIAL

## 2019-05-06 VITALS
DIASTOLIC BLOOD PRESSURE: 68 MMHG | WEIGHT: 130 LBS | OXYGEN SATURATION: 100 % | HEART RATE: 81 BPM | TEMPERATURE: 98 F | BODY MASS INDEX: 22.49 KG/M2 | RESPIRATION RATE: 16 BRPM | SYSTOLIC BLOOD PRESSURE: 103 MMHG

## 2019-05-06 DIAGNOSIS — H16.9 KERATITIS: ICD-10-CM

## 2019-05-06 DIAGNOSIS — Z32.01 PREGNANCY TEST POSITIVE: ICD-10-CM

## 2019-05-06 DIAGNOSIS — R10.84 ABDOMINAL PAIN, GENERALIZED: Primary | ICD-10-CM

## 2019-05-06 DIAGNOSIS — E55.9 VITAMIN D DEFICIENCY: ICD-10-CM

## 2019-05-06 LAB
BILIRUBIN UR: NEGATIVE
BLOOD UR: NEGATIVE
GLUCOSE URINE: NEGATIVE
HCG UR QL: POSITIVE
KETONES UR QL: NEGATIVE
LEUKOCYTE ESTERASE UR: ABNORMAL
NITRITE UR QL STRIP: NEGATIVE
PH UR STRIP: 5.5 [PH] (ref 4.5–8)
PROTEIN UR: NEGATIVE
SP GR UR STRIP: 1.02 (ref 1–1.03)
UROBILINOGEN UR STRIP-ACNC: ABNORMAL

## 2019-05-06 RX ORDER — CHOLECALCIFEROL (VITAMIN D3) 50 MCG
2000 TABLET ORAL DAILY
Qty: 100 TABLET | Refills: 3 | Status: SHIPPED | OUTPATIENT
Start: 2019-05-06 | End: 2020-05-08

## 2019-05-06 RX ORDER — PYRIDOXINE HCL (VITAMIN B6) 25 MG
25 TABLET ORAL EVERY 6 HOURS PRN
Qty: 60 TABLET | Refills: 6 | Status: SHIPPED | OUTPATIENT
Start: 2019-05-06 | End: 2019-09-09

## 2019-05-06 RX ORDER — NEOMYCIN POLYMYXIN B SULFATES AND DEXAMETHASONE 3.5; 10000; 1 MG/ML; [USP'U]/ML; MG/ML
SUSPENSION/ DROPS OPHTHALMIC
Qty: 5 ML | Refills: 1 | Status: SHIPPED | OUTPATIENT
Start: 2019-05-06 | End: 2019-08-08

## 2019-05-06 RX ORDER — PRENATAL VIT/IRON FUM/FOLIC AC 27MG-0.8MG
1 TABLET ORAL DAILY
Qty: 100 TABLET | Refills: 3 | Status: SHIPPED | OUTPATIENT
Start: 2019-05-06 | End: 2020-05-08

## 2019-05-06 NOTE — PROGRESS NOTES
There are no exam notes on file for this visit.  Chief Complaint   Patient presents with     Abdominal Pain     Pt states she has been having abdominal pain for the past two weeks or so.  She states that her period is one week late and she is also having lower right rear flank pain.  She denies any pain with urination. She would like a pregnancy test today.`     Medication Reconciliation     Complete.      Blood pressure 103/68, pulse 81, temperature 98  F (36.7  C), temperature source Oral, resp. rate 16, weight 59 kg (130 lb), SpO2 100 %.                 HPI     Sagrario Meyer is a 38 year old  female with a PMH significant for:     Patient Active Problem List   Diagnosis     Cystic acne     Latent tuberculosis     Redness of right eye     Chronic right-sided low back pain without sciatica     Vitamin D deficiency     She presents with stomach pain for two weeks.  Period is one week late. LMP 3/22/19.  She got  1/27/19 and started trying to become pregnant at that time.  still in [facility].  She came back from there on 4/15/19. Also breast pain, and lower right flank pain.  The flank pain is her chronic back pain that she gets off and on.  She is having some cramping feeling in the suprapubic area off and on, no pain. No fevers, dysuria, bleeding or vaginal discharge.    PMH, Medications and Allergies were reviewed and updated as needed.         Physical Exam:     Vitals:    05/06/19 0931   BP: 103/68   BP Location: Left arm   Patient Position: Sitting   Cuff Size: Adult Large   Pulse: 81   Resp: 16   Temp: 98  F (36.7  C)   TempSrc: Oral   SpO2: 100%   Weight: 59 kg (130 lb)     Body mass index is 22.49 kg/m .    Exam:  Constitutional: healthy, alert and no distress  Eyes: PERRLA, EOMI, conjunctiva are clear on the left eye, but injected on the right eye.  Cardiovascular:RRR. No murmurs, clicks gallops or rub  Respiratory:  normal respiratory rate and rhythm, lungs clear to auscultation. No  wheezes or crackles.  Abdomen: +BS, soft, nontender, nondistended. No HSM.  Extremities: No C/C/E in BLE.   Skin: Inflammatory, pustular acne on face.  Psychiatric: mentation appears normal and affect normal/bright        Results for orders placed or performed in visit on 05/06/19   HCG Qualitative Urine (UPT)  ([facility])   Result Value Ref Range    HCG Qual Urine POSITIVE (A) Negative   Urinalysis([facility])   Result Value Ref Range    Specific Gravity Urine 1.025 1.005 - 1.030    pH Urine 5.5 4.5 - 8.0    Leukocyte Esterase UR Trace (A) -ATIVE    Nitrite Urine Negative -ATIVE    Protein UR Negative -ATIVE    Glucose Urine Negative -ATIVE    Ketones Urine Negative -ATIVE    Urobilinogen mg/dL 0.2 E.U./dL 0.2 E.U./dL    Bilirubin UR Negative -ATIVE    Blood UR Negative -ATIVE       Assessment and Plan     Sagrario was seen today for abdominal pain and medication reconciliation.    Diagnoses and all orders for this visit:    Abdominal pain, generalized  Pregnancy test positive: Not painful on exam at all. No signs that concernme about ectopic.  Discussed that she is pregnant and what she is feeling is likely normal.  If pain is worse or any bleeding, she should be seen again.  Schedule dating US. UA normal and reassuring.  -     HCG Qualitative Urine (UPT)  ([facility])   -     Urinalysis([facility])  -     Prenatal Vit-Fe Fumarate-FA (PRENATAL MULTIVITAMIN W/IRON) 27-0.8 MG tablet; Take 1 tablet by mouth daily  -     vitamin B6 (PYRIDOXINE) 25 MG tablet; Take 1 tablet (25 mg) by mouth every 6 hours as needed (nausea)  -     US OB <14 WKS SINGLE OR FIRST GESTATION; Future    Vitamin D deficiency: refilled.  -     vitamin D3 (CHOLECALCIFEROL) 2000 units tablet; Take 1 tablet by mouth daily    Keratitis: refilled eye drops that ophthalmology gave her in the past. Needs follow up with them.  -     neomycin-polymixin-dexamethasone (MAXITROL) 0.1 % ophthalmic suspension; Use in right eye 4 times a day for 10 days.      Patient  Instructions   Go see eye doctor for eye irritation.  Follow up with dermatology for skin as well.    Return in about 2 weeks (around 5/20/2019) for Dating US and New OB in about 4 weeks..     Options for treatment and/or follow-up care were reviewed with the patient. Sagrario Meyer was engaged and actively involved in the decision making process. She verbalized understanding of the options discussed and was satisfied with the final plan.    Dee Webb MD

## 2019-05-06 NOTE — PATIENT INSTRUCTIONS
Go see eye doctor for eye irritation.  Follow up with dermatology for skin as well.    If having increased pain or bleeding should be seen again right away.

## 2019-05-13 ENCOUNTER — OFFICE VISIT (OUTPATIENT)
Dept: FAMILY MEDICINE | Facility: CLINIC | Age: 39
End: 2019-05-13
Payer: COMMERCIAL

## 2019-05-13 ENCOUNTER — TELEPHONE (OUTPATIENT)
Dept: FAMILY MEDICINE | Facility: CLINIC | Age: 39
End: 2019-05-13

## 2019-05-13 VITALS
BODY MASS INDEX: 21.63 KG/M2 | OXYGEN SATURATION: 98 % | HEIGHT: 65 IN | WEIGHT: 129.8 LBS | HEART RATE: 77 BPM | RESPIRATION RATE: 20 BRPM | SYSTOLIC BLOOD PRESSURE: 100 MMHG | DIASTOLIC BLOOD PRESSURE: 69 MMHG | TEMPERATURE: 98.3 F

## 2019-05-13 DIAGNOSIS — Z3A.01 LESS THAN 8 WEEKS GESTATION OF PREGNANCY: Primary | ICD-10-CM

## 2019-05-13 ASSESSMENT — MIFFLIN-ST. JEOR: SCORE: 1260.68

## 2019-05-13 NOTE — PROGRESS NOTES
Preceptor Attestation:   Patient seen, evaluated and discussed with the resident. I have verified the content of the note, which accurately reflects my assessment of the patient and the plan of care.   Supervising Physician:  Mariano Garner MD

## 2019-05-13 NOTE — TELEPHONE ENCOUNTER
[facility] phone call message- general phone call:    Reason for call: Would like to speak with OB nurse    Return call needed: Yes    OK to leave a message on voice mail? Yes    Primary language: English      needed? No    Call taken on May 13, 2019 at 10:51 AM by Cristopher Saba

## 2019-05-13 NOTE — TELEPHONE ENCOUNTER
Pt states that she went to [facility] for abd pain and had ultrasound done.  She has continued to have abdominal pain which has gotten worse to the point that she has been missing work.  Pt is wondering if she can get a note to be off of work for the next 1-2 weeks?  Told her that she needs to be seen and appt made for today at 3:10 PM with Dr Voss.  Pt denies vaginal bleeding and vaginal discharge.  Pt states that she feels pressure like she is going to start having bleeding but does not.  She had ultrasound at [facility] showing viable single IUP on 5/9/19.  Told pt to schedule NOB with Dr Webb after she sees Dr Voss.  Pt verbalized understanding.  Routed note to Dr Webb and Dr Voss./NG

## 2019-05-13 NOTE — PROGRESS NOTES
SUBJECTIVE  HPI: Sagrario Meyer is a 38 year old female who presents with complaints of abdominal pain, nausea and vomiting and breast pain.     She went to ED on 5/9/19 with abdominal pain that she describes as crampy lower abdominal pain, nausea and vomiting. Since then, crampy abdominal pain has improved. However she continues to have lower abdominal pain and low back pain. It is intermittent. It seems to be worse with work.     Since ED visit, she continues to have nausea. She has had one episode of vomiting since the ED visit (4 days ago). She also has had intermittent abdominal pain. States that pain feels like menstrual cramps.     She has an ultrasound scheduled on 5/20/19.    She was prescribed tylenol, B6 and unisom. She picked up B6 and tylenol. Did not get unisom yet.    Found out of 5/6/19 that she was pregnant.   LMP was around 3/25-3/27/19.   No vaginal bleeding since LMP.     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 She would like a work note to excuse from work for one week. She feels like she needs a break from work to rest. She would like to resume working after a little break.     ROS: no fevers or chills.     PMH:   Patient Active Problem List    Diagnosis Date Noted     Redness of right eye 11/01/2018     Priority: Medium     Chronic right-sided low back pain without sciatica 11/01/2018     Priority: Medium     Vitamin D deficiency 11/01/2018     Priority: Medium     Latent tuberculosis 05/19/2016     Priority: Medium     Overview:     05/05/16- Positive TB Gold test, Referred to [facility]  05/12/16 - Negative CXR at [facility]  03/31/17 - Confirmed date of completion of 9 months therapy with Isoniazid at [facility]       Cystic acne 02/03/2011     Priority: Medium     Overview:   Cystic acne with scarring         Social Hx:  Social History     Social History Narrative     Not on file     History   Smoking Status      "Never Smoker   Smokeless Tobacco     Never Used       OBJECTIVE:  Vitals: /69 (BP Location: Left arm, Patient Position: Sitting)   Pulse 77   Temp 98.3  F (36.8  C) (Oral)   Resp 20   Ht 1.645 m (5' 4.75\")   Wt 58.9 kg (129 lb 12.8 oz)   LMP 03/26/2019 (Approximate)   SpO2 98%   BMI 21.77 kg/m    General: Pleasant. Middle-aged woman. No distress.  HEENT: Moist mucous membranes. Extraocular movements intact. Sclera non-injected. Oropharynx without swelling, erythema, or exudate. No cervical lymphadenopathy.  Heart: Regular rate and rhythm. No murmurs, rubs, or gallops.  Extremities: Extremities warm and well-perfused.  Lungs: Clear to auscultation bilaterally.  GI: Abdomen normal to inspection. No ridigidity, distension, or guarding. Normoactive bowel sounds. Soft and non-tender to palpation throughout abdomen.    Results for orders placed or performed in visit on 05/06/19   HCG Qualitative Urine (UPT)  ([facility])   Result Value Ref Range    HCG Qual Urine POSITIVE (A) Negative   Urinalysis([facility])   Result Value Ref Range    Specific Gravity Urine 1.025 1.005 - 1.030    pH Urine 5.5 4.5 - 8.0    Leukocyte Esterase UR Trace (A) -ATIVE    Nitrite Urine Negative -ATIVE    Protein UR Negative -ATIVE    Glucose Urine Negative -ATIVE    Ketones Urine Negative -ATIVE    Urobilinogen mg/dL 0.2 E.U./dL 0.2 E.U./dL    Bilirubin UR Negative -ATIVE    Blood UR Negative -ATIVE         ASSESSMENT & PLAN:      Sagrario was seen today for pregnancy test, pain and forms.    Diagnoses and all orders for this visit:    Less than 8 weeks gestation of pregnancy: Positive UPT. normal ultrasound in the ED on 5/9/19 that showed a single intrauterine gestation at 6w1d gestation as well as a large 6.7 exophytic subserosal left fundal fibroid. No vaginal bleeding. previously scheduled ultrasound for next week - advised to keep this due to pt's abdominal pain. Plan to start B6, unisom for nausea. Advised eating frequent small meals " and maintaining hydration. Follow up next week after ultrasound.  -     doxylamine (UNISOM) 25 MG TABS tablet; Take 1 to 2 capsules at bedtime for nausea.      Return to clinic in 1 week.      The patient was actively involved in the decision making process, and all the questions were answered to their satisfaction prior to leaving.       Patient precepted with attending physician, Dr. Garner.    Melina Voss,    PGY-3 [facility]  Pager: [phone]

## 2019-05-13 NOTE — LETTER
May 13, 2019      Sagrario Meyer  [address]   [place] [address]        To Whom It May Concern:    Sagrario Meyer was seen in our clinic. Please excuse from work duties until 5/13/19 - 5/20/19.       Sincerely,        Melina Voss, DO

## 2019-05-14 NOTE — TELEPHONE ENCOUNTER
Called pt and assisted her with scheduling a NOB visit on Thursday, 5/23/19 at 1:00 PM for lab and with me for OB ed and at 1:50 with Dr Webb./JAVY

## 2019-05-20 DIAGNOSIS — Z32.01 PREGNANCY TEST POSITIVE: ICD-10-CM

## 2019-05-20 NOTE — LETTER
May 23, 2019      aSgrario Meyer  [address]   [place] [address]        Dear Sagrario,    Your ultrasound is reassuring.  You do have a fibroid that may be contributing to some of your discomfort.    Sincerely,    Cyndee Cardoza

## 2019-05-22 NOTE — RESULT ENCOUNTER NOTE
Your ultrasound is reassuring.  You do have a fibroid that may be contributing to some of your discomfort.

## 2019-05-23 ENCOUNTER — ALLIED HEALTH/NURSE VISIT (OUTPATIENT)
Dept: FAMILY MEDICINE | Facility: CLINIC | Age: 39
End: 2019-05-23
Payer: COMMERCIAL

## 2019-05-23 ENCOUNTER — OFFICE VISIT (OUTPATIENT)
Dept: FAMILY MEDICINE | Facility: CLINIC | Age: 39
End: 2019-05-23
Payer: COMMERCIAL

## 2019-05-23 VITALS
HEIGHT: 65 IN | BODY MASS INDEX: 21.49 KG/M2 | SYSTOLIC BLOOD PRESSURE: 93 MMHG | TEMPERATURE: 98 F | WEIGHT: 129 LBS | DIASTOLIC BLOOD PRESSURE: 62 MMHG | HEART RATE: 77 BPM | OXYGEN SATURATION: 100 % | RESPIRATION RATE: 16 BRPM

## 2019-05-23 DIAGNOSIS — M54.41 CHRONIC RIGHT-SIDED LOW BACK PAIN WITH RIGHT-SIDED SCIATICA: ICD-10-CM

## 2019-05-23 DIAGNOSIS — O09.521 ELDERLY MULTIGRAVIDA IN FIRST TRIMESTER: ICD-10-CM

## 2019-05-23 DIAGNOSIS — G89.29 CHRONIC RIGHT-SIDED LOW BACK PAIN WITH RIGHT-SIDED SCIATICA: ICD-10-CM

## 2019-05-23 DIAGNOSIS — O09.529 AMA (ADVANCED MATERNAL AGE) MULTIGRAVIDA 35+: ICD-10-CM

## 2019-05-23 DIAGNOSIS — O09.529 SUPERVISION OF HIGH-RISK PREGNANCY OF ELDERLY MULTIGRAVIDA: ICD-10-CM

## 2019-05-23 DIAGNOSIS — D25.2 SUBSEROUS LEIOMYOMA OF UTERUS: ICD-10-CM

## 2019-05-23 DIAGNOSIS — O09.529 SUPERVISION OF HIGH-RISK PREGNANCY OF ELDERLY MULTIGRAVIDA: Primary | ICD-10-CM

## 2019-05-23 DIAGNOSIS — Z65.3 PROBLEMS RELATED TO OTHER LEGAL CIRCUMSTANCES: ICD-10-CM

## 2019-05-23 LAB
BILIRUBIN UR: NEGATIVE
BLOOD UR: NEGATIVE
GLUCOSE URINE: NEGATIVE
HCT VFR BLD AUTO: 42.8 % (ref 35–47)
HEMOGLOBIN: 13.7 G/DL (ref 11.7–15.7)
HIV 1+2 AB+HIV1 P24 AG SERPL QL IA: NEGATIVE
KETONES UR QL: NEGATIVE
LEUKOCYTE ESTERASE UR: NEGATIVE
MCH RBC QN AUTO: 30.8 PG (ref 26.5–35)
MCHC RBC AUTO-ENTMCNC: 32 G/DL (ref 32–36)
MCV RBC AUTO: 96.2 FL (ref 78–100)
NITRITE UR QL STRIP: NEGATIVE
PH UR STRIP: >=9 [PH] (ref 4.5–8)
PLATELET # BLD AUTO: 251 K/UL (ref 150–450)
PROTEIN UR: NEGATIVE
RBC # BLD AUTO: 4.5 M/UL (ref 3.8–5.2)
SP GR UR STRIP: 1.01 (ref 1–1.03)
UROBILINOGEN UR STRIP-ACNC: NORMAL
WBC # BLD AUTO: 8.8 K/UL (ref 4–11)

## 2019-05-23 SDOH — ECONOMIC STABILITY: FOOD INSECURITY: WITHIN THE PAST 12 MONTHS, YOU WORRIED THAT YOUR FOOD WOULD RUN OUT BEFORE YOU GOT MONEY TO BUY MORE.: NEVER TRUE

## 2019-05-23 SDOH — ECONOMIC STABILITY: TRANSPORTATION INSECURITY
IN THE PAST 12 MONTHS, HAS LACK OF TRANSPORTATION KEPT YOU FROM MEETINGS, WORK, OR FROM GETTING THINGS NEEDED FOR DAILY LIVING?: NO

## 2019-05-23 SDOH — ECONOMIC STABILITY: INCOME INSECURITY: HOW HARD IS IT FOR YOU TO PAY FOR THE VERY BASICS LIKE FOOD, HOUSING, MEDICAL CARE, AND HEATING?: NOT HARD AT ALL

## 2019-05-23 SDOH — HEALTH STABILITY: MENTAL HEALTH: HOW OFTEN DO YOU HAVE A DRINK CONTAINING ALCOHOL?: NEVER

## 2019-05-23 SDOH — ECONOMIC STABILITY: TRANSPORTATION INSECURITY
IN THE PAST 12 MONTHS, HAS THE LACK OF TRANSPORTATION KEPT YOU FROM MEDICAL APPOINTMENTS OR FROM GETTING MEDICATIONS?: NO

## 2019-05-23 SDOH — ECONOMIC STABILITY: FOOD INSECURITY: WITHIN THE PAST 12 MONTHS, THE FOOD YOU BOUGHT JUST DIDN'T LAST AND YOU DIDN'T HAVE MONEY TO GET MORE.: NEVER TRUE

## 2019-05-23 ASSESSMENT — MIFFLIN-ST. JEOR: SCORE: 1264.98

## 2019-05-23 NOTE — LETTER
May 23, 2019      RE: Sagrario Meyer  [address]   [place] [address]      To Whom It May Concern,    I am the physician for Sagrario Meyer.  She is currently pregnant and has no family in the [place].  Her , Ash WALKER 1980, is currently in [place].  It is medically recommended that she have support during pregnancy and after the birth of her baby.  Please make every effort to reunite this family in the [place].      Sincerely,    Dee Webb MD

## 2019-05-23 NOTE — LETTER
RETURN TO WORK/SCHOOL FORM    5/23/2019    Re: Sagrario Meyer  1980      To Whom It May Concern:     Sagrario Meyer was seen in clinic today..  She may return to work with restrictions on 5/24/19          Restrictions:  limited to light duty - lifting no greater than 10 pounds.  Needs to work  due to medical condition at this time.  No walking more 50 ft at one time, needs to take breaks after walking.      Dee Webb MD  5/23/2019 2:26 PM

## 2019-05-23 NOTE — PROGRESS NOTES
Past Medical History     Do you have a history of any of the following medical conditions?    Condition No/Yes/Details   Hypertension No    Heart disease, mitral valve prolapse, rheumatic fever No    Asthma or another chronic lung disease No    An autoimmune disorder No    Kidney disease No    Frequent urinary tract infections No    Epilepsy, seizures, or spells No    Migraine headaches No    Stroke, loss of sensation/function, seizures, or other neuro problem  YES pain from rt side down to knee comes and goes started yesterday   Diabetes No    Thyroid problems or have you taken thyroid medication No    Hepatitis, liver disease, jaundice No    Blood clots, phlebitis, pulmonary embolism or varicose veins No    Excessive bleeding after surgery or dental work No    Do you have more bleeding than other women after a cut or a scratch? No    Anemia No    Blood transfusions No         Would you refuse a blood transfusion?       No   If yes, then ask next question. If no, skip next question.   Would you rather die than receive a blood transfusion? No    Breast problems No    Have you ever ? No -plans to breast feed.  Had left sided cyst removed from breast in 2004   Abnormalities of the uterus No    Abnormal pap smear No    Have you ever been treated for depression? No    Are you having problems with crying spells or loss of self-esteem? No    Have you ever required psychiatric care? No    Have you been physically, sexually, or emotionally hurt by someone? No    Have you been in a major accident or suffered serious trauma? No    Have you ever had any gynecological surgical procedures such as cervical conization, LEEP, laser treatment, cryosurgery of the cervix, or a dilatation and curettage? No    Have you had any other surgical procedures?  YES left breast cyst removed in 2004 and left leg lesion removed on back of leg above knee        Have you ever had any complications from anesthesia? No    Have you  ever been hospitalized for a nonsurgical reason? No             Substance use and exposure     Does anyone in your home smoke? No    Do you use tobacco products or betel nut? No    Do you drink beer, wine, or hard liquor? No    Do you use any of the following: marijuana, speed, cocaine, heroin, hallucinogens, or other drugs? No               Symptoms since Last Menstrual Period     Do you currently have any of the following symptoms: No/Yes/Details        Abdominal pain  YES belly button and across lower abdomen every day comes and goes and worse with activity        Blood in the stools or urine No         Chest pain No         Shortness of breath No         coughing or vomiting up blood No         Your heart racing or skipping beats No         Nausea or vomiting  YES nausea and vomiting up to 4 x's per day        Pain on urination No         Vaginal discharge or bleeding No                Genetic Screening          Is the patient 35 years or older?  YES would like all testing and perinatology consult     Do you have a history of any of the following No/Yes/Details        A metabolic disorder (e.g. Insulin-dependent DM, PKU) No         Recurrent pregnancy loss or still birth No      Do you, the baby's father, or anyone in your families have          Thalassemia AND MCV <80 No         Hemophilia No         Neural tube defect No }        Congenital heart defect No         Sickle cell disease or trait No         Muscular dystrophy No         Cystic fibrosis No         Mental retardation or autism No         Down's syndrome No         Michel-Sach's disease No         Elsa's chorea No         Any other inherited genetic or chromosomal disorder No         A child with birth defects not listed above No                Infection History     Ever treated for tuberculosis or had a positive skin test  YES treated for latent TB in 2016- received 9 months of INH   Ever had genital herpes (or has your partner) No    Had  a rash or viral illness since LMP No    Ever had a sexually transmitted infection No    Ever had chicken pox or the vaccine  YES immune by titer   Have you had a sexual partner who is HIV positive No    Ever had any other serious infectious disease No                Risk Assessment     Average Risk Category  No significant risk factors: Yes    At Risk Category (up to 3)  Teen pregnancy: No  Poor social situation: No  Domestic abuse: No  Financial difficulties: No  Smoker: No  H/O  deliver: No  H/O drug abuse: No  Non-English speaking: No  Advanced maternal age: Yes  GDM risks: No  Previous C/S: No  H/O PIH: No  H/O STIs: No  H/O mental health concerns: No  Onset care > 20 weeks: No      High Risk Category (4 or more At Risk or)  Diabetes/GDM: No  Multiple gestation: No  Chronic hypertension: No  Significant hx of asthma: No  Fetal demise > 20 weeks: No  Positive tox screen: No  Current mental health treatment: No      Risk: At Risk   Date determined: 19

## 2019-05-23 NOTE — LETTER
May 23, 2019      RE: Sagrario Meyer  [address]   [place] [address]      To Whom It May Concern,    I am the physician for Sagrario Meyer.  She is currently pregnant and has no family in the [place].  Her , Ash WALKER 1980, is currently in [place].  It is medically recommended that she have support during pregnancy and after the birth of her baby.  Please make every effort to reunite this family in the [place].      Sincerely,    Dee Webb MD

## 2019-05-23 NOTE — PATIENT INSTRUCTIONS
May 23, 2019    Legal Services Referral - [place] only  May 23, 2019 Legal referral has been sent to Melanie Ghosh from [facility]. She will review, advise, and contact the patient. Solange Floyd [facility] Referral Coordinator    5/23/19 MFM- 1st Trimester Screening: Called MFM to make sure they were aware of referral and they will review records and then call pt to schedule./NG

## 2019-05-24 PROBLEM — O09.529 SUPERVISION OF HIGH-RISK PREGNANCY OF ELDERLY MULTIGRAVIDA: Status: ACTIVE | Noted: 2019-05-23

## 2019-05-24 PROBLEM — D25.2 SUBSEROUS LEIOMYOMA OF UTERUS: Status: ACTIVE | Noted: 2019-05-09

## 2019-05-24 PROBLEM — O09.529 AMA (ADVANCED MATERNAL AGE) MULTIGRAVIDA 35+: Status: ACTIVE | Noted: 2019-05-23

## 2019-05-24 LAB
ABO + RH BLD: NORMAL
BLD GP AB SCN SERPL QL: NEGATIVE
C TRACH RRNA CVX QL NAA+PROBE: NEGATIVE
COLLECTION METHOD: NORMAL
CULTURE: NORMAL
FINAL DIAGNOSIS: NORMAL
HBV SURFACE AB SER-ACNC: POSITIVE M[IU]/ML
HBV SURFACE AG SERPL QL IA: NEGATIVE
HPV 16 DNA: NEGATIVE
HPV 18 DNA: NEGATIVE
HPV SOURCE: NORMAL
LEAD BLD-MCNC: NORMAL UG/DL
LEAD RETEST: NO
N GONORRHOEA RRNA SPEC QL NAA+PROBE: NEGATIVE
OTHER HR HPV: NEGATIVE
REPEAT ABO/RH TYPING (HML): NORMAL
RUBV IGG SERPL QL IA: POSITIVE
SPECIMEN DESCRIPTION: NORMAL
TREPONEMA ANTIBODY (SYPHILIS): NEGATIVE

## 2019-05-24 NOTE — NURSING NOTE
Family Medicine OB Education    I provided the following OB education to Sagrario Meyer.    Discussed that Dr Webb should be the doctor that she sees for her prenatal visits and that Dr Webb will try hard to be the one to deliver her baby.  Discussed that if Dr Webb was unavailable that one of our other physicians would deliver the baby and that this could be a male or female provider.  Briefly discussed residency program and that multiple doctors would be present at time of delivery.  Sheet given and discussed fetal growth and development.  Sheet given and discussed warning signs with reasons to call clinic, L&D, or 24 hr HE  line with questions or concerns (phone numbers given).  Sheet given and discussed Tdap to be given after 27 weeks gestation and the importance of family members get immunized before delivery.  Proof of pregnancy completed and given to pt.  Gave pt preregistration form for hospital to complete.  See questionnaire and pregnancy risk assessment for further information in provider encounter.     Legal Screener completed and there were no concerns for government benefits. Pt referred to [facility] due to currently living in an efficiency apartment and having worsening hyperemesis due to having to sleep in the same area where she cooks food.  Pt also requested assistance with getting her  to be able to come to the [place] From [place].  She states that she has no family here and needs assistance due to her abd and back pain.      Name of provider who requested the OB education: Dr Webb  Name of provider on site (faculty or community preceptor) at the time of performing the OB education: Dr Tracey Willams, RN BSN

## 2019-05-25 ENCOUNTER — NURSE TRIAGE (OUTPATIENT)
Dept: NURSING | Facility: CLINIC | Age: 39
End: 2019-05-25

## 2019-05-25 NOTE — TELEPHONE ENCOUNTER
"FNA triage call :   Presenting problem : Pt called , declines interpretor Amaric . \" 1st time -  7 weeks pregnant, and RIO 12/31/19 and followed by OB Dee Webb MD   Pt called. Started  5/23/19 Left groin area intermittent pain up to 10/10 @ 11pm on  5/24/19 and last at 6am at 10/10   ,  Hx of sciatic pain  . Currently no  swelling or injury or fever  or vaginal bleeding or discharge .   Guideline used : Pregnancy - abdominal pain < 20 weeks , EGA  A AH .  Disposition and recommendations : NPO and have someone drive you to ED if pain moderate - severe and Pt wants to go to ED .   Caller verbalizes understanding and denies further questions and will call back if further symptoms to triage or questions  . Maty Schmid RN  - [facility] Nurse Advisor     Reason for Disposition    MODERATE-SEVERE abdominal pain (e.g., interferes with normal activities, awakens from sleep)    Additional Information    Negative: Passed out (i.e., lost consciousness, collapsed and was not responding)    Negative: Shock suspected (e.g., cold/pale/clammy skin, too weak to stand, low BP, rapid pulse)    Negative: Difficult to awaken or acting confused (e.g., disoriented, slurred speech)    Negative: Sounds like a life-threatening emergency to the triager    Negative: Followed an abdomen (stomach) injury    Negative: [1] Abdominal pain AND [2] pregnant > 20 weeks    Protocols used: PREGNANCY - ABDOMINAL PAIN LESS THAN 20 WEEKS EGA-A-AH      "

## 2019-05-27 LAB — LEAD BLDV-MCNC: <2 UG/DL (ref 0–4.9)

## 2019-05-29 ENCOUNTER — TELEPHONE (OUTPATIENT)
Dept: FAMILY MEDICINE | Facility: CLINIC | Age: 39
End: 2019-05-29

## 2019-05-29 ENCOUNTER — TELEPHONE (OUTPATIENT)
Dept: MATERNAL FETAL MEDICINE | Facility: CLINIC | Age: 39
End: 2019-05-29

## 2019-05-29 ENCOUNTER — AMBULATORY - HEALTHEAST (OUTPATIENT)
Dept: MATERNAL FETAL MEDICINE | Facility: HOSPITAL | Age: 39
End: 2019-05-29

## 2019-05-29 DIAGNOSIS — O26.90 PREGNANCY, ANTEPARTUM, COMPLICATIONS: ICD-10-CM

## 2019-05-29 NOTE — TELEPHONE ENCOUNTER
Patient is scheduled for MFM- FTS/consult on 6/17 at [facility].    Referring clinic notified.     Sunitha Springer,    , [facility]

## 2019-05-29 NOTE — TELEPHONE ENCOUNTER
.[facility] phone call message-patient reporting a symptom:     Symptom:     Pt is pregnant is having vagina pain.     Additional comments: Pt wants to speak with Park MICHELLE to leave message on voice mail? Yes    Primary language: English      needed? No    Call taken on May 29, 2019 at 3:37 PM by Dora Chamberlain

## 2019-05-29 NOTE — TELEPHONE ENCOUNTER
Pt is 9w2d pregnant and states that after working yesterday her abd pain and low back pain is worse today.  The pain shoots down from her abdomen to vagina and comes and goes. She states that she did not go to work today due to the pain and would like Dr Webb to write a letter that she cannot work at this time due to the pain.  Pt states that after her visit last week until yesterday she noticed some vaginal bleeding but has not seen any today.  Discussed soaking in bathtub, using pillows for support, resting etc and call if symptoms worsening or if has heavy vaginal bleeding.  Pt verbalized understanding.  Appt made for charlie, 5/30/19 at 11:20 AM with Dr Webb.  Routed note to Dr Webb./JAVY

## 2019-05-30 ENCOUNTER — RECORDS - HEALTHEAST (OUTPATIENT)
Dept: ADMINISTRATIVE | Facility: OTHER | Age: 39
End: 2019-05-30

## 2019-05-30 ENCOUNTER — OFFICE VISIT (OUTPATIENT)
Dept: FAMILY MEDICINE | Facility: CLINIC | Age: 39
End: 2019-05-30
Payer: COMMERCIAL

## 2019-05-30 VITALS
WEIGHT: 127.8 LBS | BODY MASS INDEX: 21.82 KG/M2 | RESPIRATION RATE: 16 BRPM | SYSTOLIC BLOOD PRESSURE: 98 MMHG | DIASTOLIC BLOOD PRESSURE: 63 MMHG | TEMPERATURE: 98.1 F | HEIGHT: 64 IN | HEART RATE: 81 BPM

## 2019-05-30 DIAGNOSIS — D25.2 SUBSEROUS LEIOMYOMA OF UTERUS: Primary | ICD-10-CM

## 2019-05-30 DIAGNOSIS — O09.529 SUPERVISION OF HIGH-RISK PREGNANCY OF ELDERLY MULTIGRAVIDA: ICD-10-CM

## 2019-05-30 LAB
CYTOLOGY CVX/VAG DOC THIN PREP: NORMAL
HBV SURFACE AG SERPL QL IA: NEGATIVE
HIGH RISK?: NO
HIV 1+2 AB+HIV1 P24 AG SERPL QL IA: NEGATIVE
HPV REFLEX?: NORMAL
LAB AP ABNORMAL BLEEDING: NO
LAB AP BIRTH CONTROL/HORMONES: NORMAL
LAB AP CERVICAL APPEARANCE: NORMAL
LAB AP PATIENT STATUS: NORMAL
LAB AP PREVIOUS ABNL: NORMAL
LAB AP PREVIOUS NORMAL: NORMAL
LAST MENS PERIOD: NORMAL
PATH REPORT.COMMENTS IMP SPEC: NORMAL
PATH REPORT.COMMENTS IMP SPEC: NORMAL
RUBELLA ABY IGG: NORMAL
SPECIMEN ADEQUACY:: NORMAL

## 2019-05-30 ASSESSMENT — MIFFLIN-ST. JEOR: SCORE: 1239.7

## 2019-05-30 NOTE — PATIENT INSTRUCTIONS
Patient Education     Back Pain During Pregnancy  As your body changes during pregnancy, your back must work in new ways. This can be painful if your back isn t prepared. Back pain is due to many causes. Physical changes to your body can strain your back and its supporting muscles. Also, certain hormone levels (chemicals that carry messages throughout the body) increase during pregnancy. This can affect how the muscles and joints work together. All of these changes can lead to pain.  Your back  The spine is the column of bones that runs down your back. It has three curves: the cervical, thoracic, and lumbar. These curves support your body and help you keep your balance. Muscles in your back and abdomen (stomach) brace and support the spine. Muscles in your buttocks, pelvis, and thighs work with your spine to let you twist, bend, and lift.  Your back during pregnancy  During pregnancy, changes in your body affect your back and posture (how you position your body). Your body s shape and size change, making your muscles work harder. As the body prepares for childbirth, hormones cause pelvic muscles, ligaments, and joints to loosen. This can lead to pain. These changes may also cause you to use poor posture (positions that strain the spine). Over time, poor posture often results in back pain. Talk with your healthcare provider about whether a maternity support belt might help relieve some of the pressure and pain in your lower back.      Date Last Reviewed: 12/1/2017 2000-2018 [employer]. [address] [place] [id number]. All rights reserved. This information is not intended as a substitute for professional medical care. Always follow your healthcare professional's instructions.           Patient Education     Established Pregnancy, Normal Symptoms    You are pregnant and are having symptoms that worry you. During pregnancy, it s normal to have many kinds of symptoms. Here is a list of  common symptoms that happen during pregnancy.  Head and mouth    Bleeding gums    Dizziness and fainting    Extra saliva    Headaches    Nosebleeds    Skin color changes on your face    Stuffy nose    Mild blurriness of vision, especially if you wear contact lenses  Breasts    Darkening of nipples    Yellow or white discharge from the nipples    Sore breasts and nipples    Swollen breasts  Back, arms, and legs    Back, hip, or thigh pain    Leg cramps that come and go    Numbness and tingling in your hands and fingers    Swollen hands, legs, and feet    Swollen leg veins  Belly (abdomen) and pelvis    Constipation    Feeling of pressure on your bladder and stomach    Need to urinate often    Gas and bloating    Heartburn    Anal itching, swelling, and bleeding (hemorrhoids)    Leaking urine    Mild pressure or cramping in your belly    Nausea and vomiting throughout the day or night (morning sickness)    Swollen belly    Clear to white vaginal discharge  Other symptoms    Dry, itchy skin    Forgetfulness    Less interest in sex    Mood swings    Tiredness    Trouble sleeping  Home care  Here is information that may help relieve some common pregnancy symptoms.  Sore and swollen breasts    Wear a support bra that fits properly.  Nausea and indigestion    Eat smaller meals or snacks more often.    Eat bland foods, such as bananas, crackers, or rice.    Stay away from spicy, fatty, or fried foods.    Stay away from alcohol, caffeine, and tobacco.    Don t lie down right after eating.    Raise your head with pillows when you lie down.    Eat foods or beverages that have ginger. If you drink ginger ale, be sure to make sure it has real ginger and not just ginger flavoring.  Leg swelling and varicose veins    Wear elastic support hose. Put your feet up as often as possible.  Constipation    Eat more fresh fruits and vegetables and more whole grains. Drink more clear liquids.  Joint and muscle pains    Avoid heavy  lifting.    Pick things up by bending at your knees, not at your waist.    Use acetaminophen for joint and muscle pain. Don't use aspirin, ibuprofen, or naproxen.  Mouth and nose dryness or bleeding    Drink more liquids. Use a vaporizer or humidifier in your bedroom.  Don t take medicines or use remedies that your healthcare provider hasn t approved. If you have symptoms that are severe or sudden, call your healthcare provider.  Call 911  Call 911 if any of these occur:    New chest, arm, shoulder, neck, or upper back pain    Trouble breathing    Severe belly pain or very heavy bleeding    Severe lightheadedness, passing out, or fainting    Rapid heart rate    Confusion or difficulty waking up  When to seek medical advice  Call your healthcare provider right away if any of these occur:    Burning or pain when you urinate    Depression or severe anxiety    Desire to eat or drink nonfood items such as paper, dirt, or cleaning products    Diarrhea that lasts more than 24 hours    Fast heartbeat or heart palpitations    Fever of 100.4 F (38 C) or higher, or as directed by your healthcare provider    You can t keep fluids down for 6 hours without vomiting    Severe or ongoing vomiting    Little or no urine    Major vision changes    Moderate or severe belly pain    Severe back pain    Severe constipation    Severe cramping or swelling in a leg, especially if it s just on one side    Severe headache    Sudden swelling of your face, hands, feet, or ankles    Vaginal bleeding    Very itchy skin that doesn t get better  Date Last Reviewed: 10/1/2016    5801-7529 [employer]. [address] [place] [address]. All rights reserved. This information is not intended as a substitute for professional medical care. Always follow your healthcare professional's instructions.           Patient Education     Pregnancy After Age 35  It s a myth that being 35 or older means your pregnancy will be high risk.  However, women with pre-existing health problems may be at risk for complications during pregnancy. Making the right choices now and working with your healthcare provider can help make your pregnancy healthy for both you and your baby.  Things to think about  Most women who are 35 or older have normal pregnancies, but there are some things to think about before getting pregnant. Once a woman reaches 35, she has a greater chance for:    Problems getting pregnant    Miscarriage    Ectopic pregnancy (a pregnancy located outside the uterus)     Stillbirth    Diabetes or high blood pressure while pregnant    Being tired all the time when pregnant     section (surgery to deliver a baby)    Having babies with genetic problems, like Down syndrome    Being pregnant with two or more babies    Premature baby    Death  Making the right choices  Before and after you become pregnant:    Don t use recreational drugs.    Don t drink alcohol.    Don t smoke.  Keeping you and your baby healthy  Before and during your pregnancy:    Take 400 to 800 micrograms of folic acid.    Stay physically active.    Keep a healthy weight.    Avoid contact with harmful substances in your home or workplace.  You may need extra care if you have any of the following:    Sexually transmitted diseases (STDs)    Diabetes    High blood pressure    Other chronic health problems  Special healthcare  Fertility counseling. As women age, getting pregnant can get more difficult. Ask your healthcare provider how long you should try to get pregnant before seeking help from a specialist.  Genetic counseling. Genetic counseling evaluates the risk for birth defects in your baby. You will be asked detailed questions about your family health history. You may also have medical tests.  Amniocentesis. This test studies amniotic fluid (liquid that surrounds the fetus in the womb). It can help diagnose birth defects and other medical problems.   Date Last Reviewed:  10/1/2017    2676-9359 [email or web address]. [address] [place] [address], [place] [address]. All rights reserved. This information is not intended as a substitute for professional medical care. Always follow your healthcare professional's instructions.           Patient Education     Pregnancy: Common Questions  There are plenty of myths and  old wives  tales  surrounding pregnancy. You may need help  fact from fiction. On this sheet, you ll find answers to a few common questions. If you have other questions, talk with your healthcare provider.    Will working harm my baby?  In most cases, working throughout your pregnancy is not harmful at all. There may be concerns if the job involves dangerous machinery or chemicals, lifting, or standing for very long periods of time. Talk to your healthcare provider and employer about your particular job and pregnancy.  Is it safe to have sexual relations during pregnancy?  Yes, unless you are specifically instructed not to by your healthcare provider.  Why can t I change the cat litter box?  Cats carry a disease called toxoplasmosis. In adult humans, it shows up as a mild infection of the blood and organs. If you are infected during pregnancy, the baby s brain and eyes could be damaged. To be safe, have someone else change the litter. If you must handle it, wear a paper mask over your nose and mouth. Also, wear gloves and wash your hands afterward.  Which medicines are safe?  No prescription or over-the-counter medicine is safe for everyone all of the time. But sometimes medicines are needed.  Be sure your healthcare provider knows you are pregnant. Then use only the medicines he or she advises you to take.  Is it true that I can overheat my baby?  Yes. To avoid making your baby too warm:    Don t sit in a Jacuzzi. A long, warm bath is fine, but not in water over 100 F (37.7 C).    Exercise less intensely if you feel fatigued. Base your workout on how you feel, not  your heart rate. Heart rates aren t a good way to measure effort during pregnancy.  Can I lift and carry safely?  Yes, if your healthcare provider doesn t tell you otherwise. Learn to lift and carry safely to avoid injury and reduce back pain during pregnancy. To protect your back:    Bend at the knees to bring the load nearer.    Get a good . Test the weight of the load.    Tighten your belly. Exhale as you lift.    Lift with your legs, not with your back.    Carry the load close to your body.    Hold the load so you can see where you are going.  What if I get sick?  Most women get sick at least once during pregnancy. Talk with your healthcare provider if you do. Most likely it will not affect your pregnancy. Get plenty of rest and fluids, and eat what you can. Talk to your healthcare provider before taking any medicines.  Date Last Reviewed: 10/1/2017    2542-5635 [employer]. [address] [place] [address]. All rights reserved. This information is not intended as a substitute for professional medical care. Always follow your healthcare professional's instructions.           Patient Education     Uterine Fibroids    Fibroids are lumps (growths) of muscle tissue. They can form in the wall of uterus (womb). Fibroids are very common. They are almost always benign (noncancerous). It is not known what causes fibroids. But they may run in families. Also, changes in female hormones may cause them to grow over time. After menopause, fibroids may stop growing, shrink, or go away.  Fibroids may or may not cause symptoms. This depends on many factors, such as the size, number, and locations of the fibroids. If symptoms do occur, they can include:    Heavy bleeding (in severe cases, this may lead to a problem called anemia) or painful periods    Feeling of fullness, swelling, pressure, or pain in the lower belly (abdomen) or pelvic region    Lower back pain    Frequent  urination    Constipation    Pain during sex    Problems getting pregnant  If fibroids are suspected, an evaluation will be done to help understand the extent of the problem. This can include a health history, exam, and tests. Based on the results, treatment can then be planned if needed.  Until more details are known about your problem, you may be given guidelines similar to the home care instructions below.      Home care    To help control pain, over-the-counter pain medicine may be advised. Take these only as directed by your provider.    If you have heavy or painful periods, keep a log of your menstrual cycle. Show the log to your provider. The information may help him or her determine if your symptoms are due to fibroids or another cause.    Make certain lifestyle changes. This may include losing excess weight, being more active, or eating less red meat. These changes may help lower the risk of fibroids in some people. They may also help improve overall health.  Follow-up care  Follow up with your healthcare provider as advised. If testing was done, you ll be told the results when they are ready. Treatment options for fibroids may include medicines or procedures to help shrink or keep fibroids from growing. In severe cases, surgery may be needed to remove fibroids or to remove the uterus. If you have fibroids but no symptoms, you may not need treatment at all. However, your provider may advise regular follow-up to check fibroid size and growth.  When to seek medical advice  Call your healthcare provider right away if any of these occur:    Heavy bleeding or painful periods that continue or don t get better with treatment    Signs of anemia such as extreme fatigue, pale skin, shortness of breath with little exertion, or rapid heartbeat    Weakness, dizziness, or fainting    Severe pain in the pelvic or belly region    Swollen or enlarged belly  Date Last Reviewed: 10/1/2017    4667-9430 [employer]  [employer]. [address] [place] [address]. All rights reserved. This information is not intended as a substitute for professional medical care. Always follow your healthcare professional's instructions.           Patient Education     Comfort Tips During Pregnancy    Talk with your healthcare provider before using pain-relieving medicine at any time during your pregnancy.  First trimester tips  Nausea    Get up slowly. Eat a few unsalted crackers before you get out of bed.    Avoid smells that bother you.    Eat small bland low fat, light high-protein meals at frequent intervals.    Sip on water, weak tea, or clear soft drinks, like ginger ale. Eat ice chips.  Fatigue    Take catnaps when you can.    Get regular exercise.    Accept help from others.    Practice good sleep habits, like going to bed and getting up at the same time each day. Use your bed only for sleep and sex.  Mood swings    Talk about your feelings with others, including other mothers.    Limit sugar, chocolate, and caffeine.    Eat a healthy diet. Don t skip meals.    Get regular exercise.  Headaches    Get fresh air and exercise.    Relax and get enough rest.    Check with your healthcare provider before taking any pain medicines.  Second trimester tips  Here are some suggestions to help you cope:    To limit ankle swelling, sit with your feet raised or wear support hose.    If you have pain in your groin and stomach (round ligament pain), avoid sudden twisting movements.    For leg cramps, flexing your foot often brings immediate relief. You also may try massaging your calf in long, downward strokes, or stretching your legs before going to bed. Get enough exercise and wear shoes with flexible soles.  Third trimester tips  Reducing heartburn    Eat small, light meals throughout the day rather than 3 large ones.    Sleep with your upper body raised 6 inches. Don t lie down until 2 hours after you eat.    Don't eat greasy, fried, or spicy  foods.    Avoid citrus fruits and juices.  Treating constipation    Eat foods high in fiber (whole-grain foods, fresh fruit and vegetables).    Drink plenty of water.    Get regular exercise.    Discuss other medicines (like docusate and psyllium) with your healthcare provider.  Taking care of your breasts    Avoid using harsh soaps or alcohol, which can cause excessive dryness.    Wear nursing bras. They provide more support than regular bras and can be used after pregnancy if you breastfeed.  Getting a good night s sleep    Take a warm shower before bed.    Sleep on a firm mattress.    Lie on your side with 1 leg crossed over the other.    Use pillows to support arms, legs, and belly.  Date Last Reviewed: 10/1/2017    8056-7122 [employer]. [address] [place] [address], [place] [address]. All rights reserved. This information is not intended as a substitute for professional medical care. Always follow your healthcare professional's instructions.           Patient Education     Adapting to Pregnancy: First Trimester    As your body adjusts, you may have to change or limit your daily activities. You ll need more rest. You may also need to use the energy you have more wisely.  Your changing body  Almost every part of your body is affected as you adapt to pregnancy. The uterus and cervix will begin to soften right away. You may not look very pregnant during the first 3 months. But you are likely to have some common signs of early pregnancy:    Nausea    Fatigue    Frequent urination    Mood swings    Bloating of the belly    Constipation    Heartburn    Missed or light periods (first trimester bleeding)    Nipple or breast tenderness and breast swelling  It s not too late to start good habits  What matters most is protecting your baby from this moment on. If you smoke, drink alcohol, or use drugs, now is the time to stop. If you need help, talk with your healthcare provider:    Smoking increases the risk of  stillbirth or having a low-birth-weight baby. If you smoke, quit now.    Alcohol and drugs have been linked with miscarriage, birth defects, intellectual disability, and low birth weight. Do not drink alcohol or take drugs.  Tips to relieve nausea  Although nausea can happen at any time of the day, it may be worse in the morning. To help prevent nausea:    Eat small, light meals at frequent intervals.    Drink fluids often.    Get up slowly. Eat a few unsalted crackers before you get out of bed.    Avoid smells that bother you.    Avoid spicy and fatty foods.    Eat an ice pop in your favorite flavor.    Get plenty of rest.    Ask your healthcare provider about taking jose or vitamin B6 for nausea and vomiting.    Talk with your healthcare provider if you take vitamins that upset your stomach.  Work concerns  The end of the first trimester is a good time to discuss working during pregnancy with your employer. Follow your healthcare provider s advice if your job needs you to stand for a long time, work with hazardous tools, or even sit at a desk all day. Your workspace, workload, or scheduled hours may need to be adjusted. Perhaps you can change body postures more often or take an extra break.  Advice for travel  Talk to your healthcare provider first, but the second trimester may be the best time for any travel. You may be advised to avoid certain trips while you re pregnant. Food and water can be concerns in developing countries. Travel by car is a good choice, as you can stop, get out, and stretch. Bring snacks and water along. Fasten the lap belt below your belly, low over your hips. Also be sure to wear the shoulder harness.  Intimacy  Unless your healthcare provider tells you to, there is no reason to stop having sex while you re pregnant. You or your partner may notice changes in desire. Desire may be less in the first trimester, due to nausea and fatigue. In the second trimester, sex may be very enjoyable.  "The third trimester can be a challenge comfort-wise. Try different positions and see what s best for you both.  Date Last Reviewed: 10/1/2017    9102-6664 [employer]. [address] [place] [address]. All rights reserved. This information is not intended as a substitute for professional medical care. Always follow your healthcare professional's instructions.           Patient Education     Pregnancy: Your First Trimester Changes  The first trimester is a time of rapid development for your baby. Because your baby is growing so quickly, it is important that you start a healthy lifestyle right away. By the end of the first trimester, your baby has formed all of its major body organs and weighs just over an ounce.     Actual size of baby is 1/4\"    Month 1 (weeks 1 to 4)  The placenta (the organ that nourishes your baby) begins to form. The brain, spinal cord, heart, gastrointestinal tract, and lungs begin to develop. Your baby is about 1/4-inch long by the end of the first month.     Actual size of baby is 1\"      Month 2 (weeks 5 to 8)  All of your baby s major body organs form. The face, fingers, toes, ears, and eyes appear. By the end of the month, your baby is about 1-inch long.     Actual size of baby is 3\"      Month 3 (weeks 9 to 12)  Your baby can open and close its fists and mouth. The sexual organs begin to form. As the first trimester ends, your baby is about 3-inches long.  Date Last Reviewed: 10/1/2017    9555-7925 [employer]. [address] [place] [address]. All rights reserved. This information is not intended as a substitute for professional medical care. Always follow your healthcare professional's instructions.    [facility]  Phone: [phone]  Fax: [phone]    [facility]  [address] [place] [address]    Appointment:  Friday May 31, 2019   Arrival Time:  12:15 pm     Make sure you have a full bladder     Please bring a " copy of your insurance card and photo ID    If you cannot make this appointment, please call [phone] to reschedule    Referral and demographics faxed to [phone]

## 2019-05-31 ENCOUNTER — TELEPHONE (OUTPATIENT)
Dept: FAMILY MEDICINE | Facility: CLINIC | Age: 39
End: 2019-05-31

## 2019-05-31 NOTE — TELEPHONE ENCOUNTER
[facility] phone call message- general phone call:    Reason for call: She needs a call back re she needs    Return call needed: Yes    OK to leave a message on voice mail? Yes    Primary language: English      needed? No    Call taken on May 31, 2019 at 11:05 AM by Graham Dupree

## 2019-06-04 NOTE — PROGRESS NOTES
"    There are no exam notes on file for this visit.  Chief Complaint   Patient presents with     Abdominal Pain     shooting pain sometimes. Back pain also     Blood pressure 98/63, pulse 81, temperature 98.1  F (36.7  C), resp. rate 16, height 1.626 m (5' 4\"), weight 58 kg (127 lb 12.8 oz), last menstrual period 03/25/2019.                 ALVARO Meyer is a 38 year old  female with a PMH significant for:     Patient Active Problem List   Diagnosis     Cystic acne     Latent tuberculosis     Redness of right eye     Chronic right-sided low back pain without sciatica     Vitamin D deficiency     AMA (advanced maternal age) multigravida 35+     Supervision of high-risk pregnancy of elderly multigravida     Subserous leiomyoma of uterus     She presents with continued abdominal pain which is getting a little bit worse so that she cannot work.  Pain seems to be at the top of her abdomen and she feels a lump that is new since her visit last week.  We had discussed that she would have a repeat ultrasound with MFM at that visit for her first trimester screen as well.  She is wondering if we can check the fibroid and baby sooner.  She denies headache, changes in vision, bleeding except for a little after the Pap smear,, fluid leaking.    PMH, Medications and Allergies were reviewed and updated as needed.           Physical Exam:     Vitals:    05/30/19 1137   BP: 98/63   Pulse: 81   Resp: 16   Temp: 98.1  F (36.7  C)   Weight: 58 kg (127 lb 12.8 oz)   Height: 1.626 m (5' 4\")     Body mass index is 21.94 kg/m .    Exam:  Constitutional: healthy, alert and no distress  Cardiovascular:RRR. No murmurs, clicks gallops or rub  Respiratory:  normal respiratory rate and rhythm, lungs clear to auscultation. No wheezes or crackles.  Abdomen: +BS, soft, nontender, nondistended. No HSM.  Fundus is consistent with dates fetal heart beat is seen with bedside ultrasound.  Measured fetal heart tones 150 with Doppler.  Abdomen is " tender near the abdominal mass that appears to be next to her uterus.  Size is consistent with fibroid that is known to be present on other ultrasounds.  Was not measured in detail.  Extremities: No C/C/E in BLE.   Psychiatric: mentation appears normal and affect normal/bright    Results for orders placed or performed in visit on 05/23/19   ABO/Rh Type-HML ([facility])   Result Value Ref Range    ABO/Rh(D) O POS     Repeat ABO/Rh Typing ([facility]) O POS     Narrative    Test performed by:   [facility] [address], [place] [address]   Antibody Screen ([facility])   Result Value Ref Range    Antibody Screen Negative Negative    Narrative    Test performed by:   [facility] [address], [place] [address]   Chlamydia/Gono Amplified ([facility])   Result Value Ref Range    Chlamydia trac,Amplified Prb Negative Negative    N gonorrhoeae,Amplified Prb Negative Negative    Narrative    Test performed by:  [facility] [address] [place] [address]   CBC with Plt ([facility])   Result Value Ref Range    WBC 8.8 4.0 - 11.0 K/uL    RBC 4.5 3.8 - 5.2 M/uL    Hemoglobin 13.7 11.7 - 15.7 g/dL    Hematocrit 42.8 35.0 - 47.0 %    MCV 96.2 78.0 - 100.0 fL    MCH 30.8 26.5 - 35.0    MCHC 32.0 32.0 - 36.0 g/dL    Platelets 251.0 150.0 - 450.0 K/uL   Culture Urine ([facility])   Result Value Ref Range    Culture SEE RESULTS BELOW     Narrative    Test performed by:  [facility] [address] [place] [address]   Hepatitis B Surface Ag ([facility])   Result Value Ref Range    Hepatitis B Surface Antigen Negative Negative    Narrative    Test performed by:  [facility] [address] [place] [address]   HIV Ag/Ab Screen [place] ([facility])   Result Value Ref Range    HIV Antigen/Antibody Negative Negative    Narrative    Test performed by:  [facility] [address] [place] [address]  Method is Abbott HIV Ag/Ab for the detection of HIV p24 antigen, HIV-1    antibodies and HIV-2 antibodies.   Lead, Blood ([facility])   Result Value Ref Range    Lead See Note. <5.0 ug/dL    Collection Method Venous     Lead Retest No     Narrative    Test performed by:  [facility] [address] [place] [address]   Rubella  IgG ([facility])   Result Value Ref Range    Rubella IgG Positive     Narrative    Test performed by:  [facility] [address] [place] [address]  Negative: Absence of detectable rubella virus IgG antibodies. A negative   result presumes that immunity has not been acquired.   Equivocal: Suggest recollection.  Positive: Considered positive for IgG antibodies to rubella virus.   Syphilis Screen [place] ([facility])   Result Value Ref Range    Treponema Antibody (Syphilis) Negative Negative    Narrative    Test performed by:  [facility] [address] [place] [address]   Urinalysis([facility])   Result Value Ref Range    Specific Gravity Urine 1.015 1.005 - 1.030    pH Urine >=9.0 4.5 - 8.0    Leukocyte Esterase UR Negative -ATIVE    Nitrite Urine Negative -ATIVE    Protein UR Negative -ATIVE    Glucose Urine Negative -ATIVE    Ketones Urine Negative -ATIVE    Urobilinogen mg/dL 0.2 E.U./dL 0.2 E.U./dL    Bilirubin UR Negative -ATIVE    Blood UR Negative -ATIVE   GYN Cytology ([facility])   Result Value Ref Range    Lab AP Case Report SEE RESULTS BELOW     Lab AP Gyn Interpretation SEE RESULTS BELOW Negative for squamous intraepithelial le    Lab AP Malignant? Normal Normal    Specimen adequacy:       Satisfactory for evaluation, endocervical/transformation zone component present    HPV Reflex? Yes regardless of result     High Risk? No     Last Mens Period 3/25/19     Lab AP Abnormal Bleeding No     Lab AP Patient Status pregnant     Lab AP Birth Control/Hormones None     Lab AP Previous Normal none     Lab AP Previous Abnl none     Lab AP Cervical Appearance normal     Narrative    Test performed by:  [facility]  [address] [place] [address]   Hepatitis B Surface Ab ([facility])   Result Value Ref Range    Hepatitis B Surface Antibody Positive (A) Negative    Narrative    Test performed by:   [facility]  [address] [place] [address]   Lead With Demographics ([facility])   Result Value Ref Range    Lead, Blood (Venous) <2.0 0.0 - 4.9 ug/dL    Narrative    Test performed by:  [facility]  [address] [place] [id number]   HPV [place] PCR ([facility])   Result Value Ref Range    HPV Source [facility]     HPV 16 DNA Negative NEG    HPV 18 DNA Negative NEG    Other HR HPV Negative NEG    Final Diagnosis SEE NOTES     Specimen Description Cervical Cells     Narrative    Test performed by:  [facility]  [address], [place] [address]       Assessment and Plan     Sagrario was seen today for abdominal pain.    Diagnoses and all orders for this visit:    Subserous leiomyoma of uterus  Supervision of high-risk pregnancy of elderly multigravida  -     US OB <14 WKS SINGLE OR FIRST GESTATION; Future  Repeating an ultrasound to make sure fibroid is is the mass that she is feeling.  No symptoms of miscarriage at this time.  Suspect that her belly pain is from the fibroid.  Discussed using heating pads and Tylenol regularly.  She is nervous about using Tylenol.  Gave her another work note to be off until after her ultrasound.    Gave her a pregnancy blood work from [employer] and attached a lot of patient education and AVS.    Patient Instructions       Patient Education     Back Pain During Pregnancy  As your body changes during pregnancy, your back must work in new ways. This can be painful if your back isn t prepared. Back pain is due to many causes. Physical changes to your body can strain your back and its supporting muscles. Also, certain hormone levels (chemicals that carry messages throughout the body) increase during pregnancy. This can  affect how the muscles and joints work together. All of these changes can lead to pain.  Your back  The spine is the column of bones that runs down your back. It has three curves: the cervical, thoracic, and lumbar. These curves support your body and help you keep your balance. Muscles in your back and abdomen (stomach) brace and support the spine. Muscles in your buttocks, pelvis, and thighs work with your spine to let you twist, bend, and lift.  Your back during pregnancy  During pregnancy, changes in your body affect your back and posture (how you position your body). Your body s shape and size change, making your muscles work harder. As the body prepares for childbirth, hormones cause pelvic muscles, ligaments, and joints to loosen. This can lead to pain. These changes may also cause you to use poor posture (positions that strain the spine). Over time, poor posture often results in back pain. Talk with your healthcare provider about whether a maternity support belt might help relieve some of the pressure and pain in your lower back.      Date Last Reviewed: 12/1/2017 2000-2018 [employer]. [address] [place] [address]. All rights reserved. This information is not intended as a substitute for professional medical care. Always follow your healthcare professional's instructions.           Patient Education     Established Pregnancy, Normal Symptoms    You are pregnant and are having symptoms that worry you. During pregnancy, it s normal to have many kinds of symptoms. Here is a list of common symptoms that happen during pregnancy.  Head and mouth    Bleeding gums    Dizziness and fainting    Extra saliva    Headaches    Nosebleeds    Skin color changes on your face    Stuffy nose    Mild blurriness of vision, especially if you wear contact lenses  Breasts    Darkening of nipples    Yellow or white discharge from the nipples    Sore breasts and nipples    Swollen breasts  Back, arms,  and legs    Back, hip, or thigh pain    Leg cramps that come and go    Numbness and tingling in your hands and fingers    Swollen hands, legs, and feet    Swollen leg veins  Belly (abdomen) and pelvis    Constipation    Feeling of pressure on your bladder and stomach    Need to urinate often    Gas and bloating    Heartburn    Anal itching, swelling, and bleeding (hemorrhoids)    Leaking urine    Mild pressure or cramping in your belly    Nausea and vomiting throughout the day or night (morning sickness)    Swollen belly    Clear to white vaginal discharge  Other symptoms    Dry, itchy skin    Forgetfulness    Less interest in sex    Mood swings    Tiredness    Trouble sleeping  Home care  Here is information that may help relieve some common pregnancy symptoms.  Sore and swollen breasts    Wear a support bra that fits properly.  Nausea and indigestion    Eat smaller meals or snacks more often.    Eat bland foods, such as bananas, crackers, or rice.    Stay away from spicy, fatty, or fried foods.    Stay away from alcohol, caffeine, and tobacco.    Don t lie down right after eating.    Raise your head with pillows when you lie down.    Eat foods or beverages that have ginger. If you drink ginger ale, be sure to make sure it has real ginger and not just ginger flavoring.  Leg swelling and varicose veins    Wear elastic support hose. Put your feet up as often as possible.  Constipation    Eat more fresh fruits and vegetables and more whole grains. Drink more clear liquids.  Joint and muscle pains    Avoid heavy lifting.    Pick things up by bending at your knees, not at your waist.    Use acetaminophen for joint and muscle pain. Don't use aspirin, ibuprofen, or naproxen.  Mouth and nose dryness or bleeding    Drink more liquids. Use a vaporizer or humidifier in your bedroom.  Don t take medicines or use remedies that your healthcare provider hasn t approved. If you have symptoms that are severe or sudden, call your  healthcare provider.  Call 911  Call 911 if any of these occur:    New chest, arm, shoulder, neck, or upper back pain    Trouble breathing    Severe belly pain or very heavy bleeding    Severe lightheadedness, passing out, or fainting    Rapid heart rate    Confusion or difficulty waking up  When to seek medical advice  Call your healthcare provider right away if any of these occur:    Burning or pain when you urinate    Depression or severe anxiety    Desire to eat or drink nonfood items such as paper, dirt, or cleaning products    Diarrhea that lasts more than 24 hours    Fast heartbeat or heart palpitations    Fever of 100.4 F (38 C) or higher, or as directed by your healthcare provider    You can t keep fluids down for 6 hours without vomiting    Severe or ongoing vomiting    Little or no urine    Major vision changes    Moderate or severe belly pain    Severe back pain    Severe constipation    Severe cramping or swelling in a leg, especially if it s just on one side    Severe headache    Sudden swelling of your face, hands, feet, or ankles    Vaginal bleeding    Very itchy skin that doesn t get better  Date Last Reviewed: 10/1/2016    6211-8777 [employer]. [address] [place] [address]. All rights reserved. This information is not intended as a substitute for professional medical care. Always follow your healthcare professional's instructions.           Patient Education     Pregnancy After Age 35  It s a myth that being 35 or older means your pregnancy will be high risk. However, women with pre-existing health problems may be at risk for complications during pregnancy. Making the right choices now and working with your healthcare provider can help make your pregnancy healthy for both you and your baby.  Things to think about  Most women who are 35 or older have normal pregnancies, but there are some things to think about before getting pregnant. Once a woman reaches 35, she has a  greater chance for:    Problems getting pregnant    Miscarriage    Ectopic pregnancy (a pregnancy located outside the uterus)     Stillbirth    Diabetes or high blood pressure while pregnant    Being tired all the time when pregnant     section (surgery to deliver a baby)    Having babies with genetic problems, like Down syndrome    Being pregnant with two or more babies    Premature baby    Death  Making the right choices  Before and after you become pregnant:    Don t use recreational drugs.    Don t drink alcohol.    Don t smoke.  Keeping you and your baby healthy  Before and during your pregnancy:    Take 400 to 800 micrograms of folic acid.    Stay physically active.    Keep a healthy weight.    Avoid contact with harmful substances in your home or workplace.  You may need extra care if you have any of the following:    Sexually transmitted diseases (STDs)    Diabetes    High blood pressure    Other chronic health problems  Special healthcare  Fertility counseling. As women age, getting pregnant can get more difficult. Ask your healthcare provider how long you should try to get pregnant before seeking help from a specialist.  Genetic counseling. Genetic counseling evaluates the risk for birth defects in your baby. You will be asked detailed questions about your family health history. You may also have medical tests.  Amniocentesis. This test studies amniotic fluid (liquid that surrounds the fetus in the womb). It can help diagnose birth defects and other medical problems.   Date Last Reviewed: 10/1/2017    5542-6055 [employer]. [address] [place] [address], [place] [address]. All rights reserved. This information is not intended as a substitute for professional medical care. Always follow your healthcare professional's instructions.           Patient Education     Pregnancy: Common Questions  There are plenty of myths and  old wives  tales  surrounding pregnancy. You may need help   fact from fiction. On this sheet, you ll find answers to a few common questions. If you have other questions, talk with your healthcare provider.    Will working harm my baby?  In most cases, working throughout your pregnancy is not harmful at all. There may be concerns if the job involves dangerous machinery or chemicals, lifting, or standing for very long periods of time. Talk to your healthcare provider and employer about your particular job and pregnancy.  Is it safe to have sexual relations during pregnancy?  Yes, unless you are specifically instructed not to by your healthcare provider.  Why can t I change the cat litter box?  Cats carry a disease called toxoplasmosis. In adult humans, it shows up as a mild infection of the blood and organs. If you are infected during pregnancy, the baby s brain and eyes could be damaged. To be safe, have someone else change the litter. If you must handle it, wear a paper mask over your nose and mouth. Also, wear gloves and wash your hands afterward.  Which medicines are safe?  No prescription or over-the-counter medicine is safe for everyone all of the time. But sometimes medicines are needed.  Be sure your healthcare provider knows you are pregnant. Then use only the medicines he or she advises you to take.  Is it true that I can overheat my baby?  Yes. To avoid making your baby too warm:    Don t sit in a Jacuzzi. A long, warm bath is fine, but not in water over 100 F (37.7 C).    Exercise less intensely if you feel fatigued. Base your workout on how you feel, not your heart rate. Heart rates aren t a good way to measure effort during pregnancy.  Can I lift and carry safely?  Yes, if your healthcare provider doesn t tell you otherwise. Learn to lift and carry safely to avoid injury and reduce back pain during pregnancy. To protect your back:    Bend at the knees to bring the load nearer.    Get a good . Test the weight of the load.    Tighten your belly. Exhale as you  lift.    Lift with your legs, not with your back.    Carry the load close to your body.    Hold the load so you can see where you are going.  What if I get sick?  Most women get sick at least once during pregnancy. Talk with your healthcare provider if you do. Most likely it will not affect your pregnancy. Get plenty of rest and fluids, and eat what you can. Talk to your healthcare provider before taking any medicines.  Date Last Reviewed: 10/1/2017    4444-2008 [employer]. [address] [place] [address]. All rights reserved. This information is not intended as a substitute for professional medical care. Always follow your healthcare professional's instructions.           Patient Education     Uterine Fibroids    Fibroids are lumps (growths) of muscle tissue. They can form in the wall of uterus (womb). Fibroids are very common. They are almost always benign (noncancerous). It is not known what causes fibroids. But they may run in families. Also, changes in female hormones may cause them to grow over time. After menopause, fibroids may stop growing, shrink, or go away.  Fibroids may or may not cause symptoms. This depends on many factors, such as the size, number, and locations of the fibroids. If symptoms do occur, they can include:    Heavy bleeding (in severe cases, this may lead to a problem called anemia) or painful periods    Feeling of fullness, swelling, pressure, or pain in the lower belly (abdomen) or pelvic region    Lower back pain    Frequent urination    Constipation    Pain during sex    Problems getting pregnant  If fibroids are suspected, an evaluation will be done to help understand the extent of the problem. This can include a health history, exam, and tests. Based on the results, treatment can then be planned if needed.  Until more details are known about your problem, you may be given guidelines similar to the home care instructions below.      Home care    To help  control pain, over-the-counter pain medicine may be advised. Take these only as directed by your provider.    If you have heavy or painful periods, keep a log of your menstrual cycle. Show the log to your provider. The information may help him or her determine if your symptoms are due to fibroids or another cause.    Make certain lifestyle changes. This may include losing excess weight, being more active, or eating less red meat. These changes may help lower the risk of fibroids in some people. They may also help improve overall health.  Follow-up care  Follow up with your healthcare provider as advised. If testing was done, you ll be told the results when they are ready. Treatment options for fibroids may include medicines or procedures to help shrink or keep fibroids from growing. In severe cases, surgery may be needed to remove fibroids or to remove the uterus. If you have fibroids but no symptoms, you may not need treatment at all. However, your provider may advise regular follow-up to check fibroid size and growth.  When to seek medical advice  Call your healthcare provider right away if any of these occur:    Heavy bleeding or painful periods that continue or don t get better with treatment    Signs of anemia such as extreme fatigue, pale skin, shortness of breath with little exertion, or rapid heartbeat    Weakness, dizziness, or fainting    Severe pain in the pelvic or belly region    Swollen or enlarged belly  Date Last Reviewed: 10/1/2017    7053-5733 [employer]. [address] [place] [address]. All rights reserved. This information is not intended as a substitute for professional medical care. Always follow your healthcare professional's instructions.           Patient Education     Comfort Tips During Pregnancy    Talk with your healthcare provider before using pain-relieving medicine at any time during your pregnancy.  First trimester tips  Nausea    Get up slowly. Eat a few  unsalted crackers before you get out of bed.    Avoid smells that bother you.    Eat small bland low fat, light high-protein meals at frequent intervals.    Sip on water, weak tea, or clear soft drinks, like ginger ale. Eat ice chips.  Fatigue    Take catnaps when you can.    Get regular exercise.    Accept help from others.    Practice good sleep habits, like going to bed and getting up at the same time each day. Use your bed only for sleep and sex.  Mood swings    Talk about your feelings with others, including other mothers.    Limit sugar, chocolate, and caffeine.    Eat a healthy diet. Don t skip meals.    Get regular exercise.  Headaches    Get fresh air and exercise.    Relax and get enough rest.    Check with your healthcare provider before taking any pain medicines.  Second trimester tips  Here are some suggestions to help you cope:    To limit ankle swelling, sit with your feet raised or wear support hose.    If you have pain in your groin and stomach (round ligament pain), avoid sudden twisting movements.    For leg cramps, flexing your foot often brings immediate relief. You also may try massaging your calf in long, downward strokes, or stretching your legs before going to bed. Get enough exercise and wear shoes with flexible soles.  Third trimester tips  Reducing heartburn    Eat small, light meals throughout the day rather than 3 large ones.    Sleep with your upper body raised 6 inches. Don t lie down until 2 hours after you eat.    Don't eat greasy, fried, or spicy foods.    Avoid citrus fruits and juices.  Treating constipation    Eat foods high in fiber (whole-grain foods, fresh fruit and vegetables).    Drink plenty of water.    Get regular exercise.    Discuss other medicines (like docusate and psyllium) with your healthcare provider.  Taking care of your breasts    Avoid using harsh soaps or alcohol, which can cause excessive dryness.    Wear nursing bras. They provide more support than regular  bras and can be used after pregnancy if you breastfeed.  Getting a good night s sleep    Take a warm shower before bed.    Sleep on a firm mattress.    Lie on your side with 1 leg crossed over the other.    Use pillows to support arms, legs, and belly.  Date Last Reviewed: 10/1/2017    4955-5124 [employer]. [address] [place] [address], [place] [address]. All rights reserved. This information is not intended as a substitute for professional medical care. Always follow your healthcare professional's instructions.           Patient Education     Adapting to Pregnancy: First Trimester    As your body adjusts, you may have to change or limit your daily activities. You ll need more rest. You may also need to use the energy you have more wisely.  Your changing body  Almost every part of your body is affected as you adapt to pregnancy. The uterus and cervix will begin to soften right away. You may not look very pregnant during the first 3 months. But you are likely to have some common signs of early pregnancy:    Nausea    Fatigue    Frequent urination    Mood swings    Bloating of the belly    Constipation    Heartburn    Missed or light periods (first trimester bleeding)    Nipple or breast tenderness and breast swelling  It s not too late to start good habits  What matters most is protecting your baby from this moment on. If you smoke, drink alcohol, or use drugs, now is the time to stop. If you need help, talk with your healthcare provider:    Smoking increases the risk of stillbirth or having a low-birth-weight baby. If you smoke, quit now.    Alcohol and drugs have been linked with miscarriage, birth defects, intellectual disability, and low birth weight. Do not drink alcohol or take drugs.  Tips to relieve nausea  Although nausea can happen at any time of the day, it may be worse in the morning. To help prevent nausea:    Eat small, light meals at frequent intervals.    Drink fluids often.    Get up slowly.  Eat a few unsalted crackers before you get out of bed.    Avoid smells that bother you.    Avoid spicy and fatty foods.    Eat an ice pop in your favorite flavor.    Get plenty of rest.    Ask your healthcare provider about taking jose or vitamin B6 for nausea and vomiting.    Talk with your healthcare provider if you take vitamins that upset your stomach.  Work concerns  The end of the first trimester is a good time to discuss working during pregnancy with your employer. Follow your healthcare provider s advice if your job needs you to stand for a long time, work with hazardous tools, or even sit at a desk all day. Your workspace, workload, or scheduled hours may need to be adjusted. Perhaps you can change body postures more often or take an extra break.  Advice for travel  Talk to your healthcare provider first, but the second trimester may be the best time for any travel. You may be advised to avoid certain trips while you re pregnant. Food and water can be concerns in developing countries. Travel by car is a good choice, as you can stop, get out, and stretch. Bring snacks and water along. Fasten the lap belt below your belly, low over your hips. Also be sure to wear the shoulder harness.  Intimacy  Unless your healthcare provider tells you to, there is no reason to stop having sex while you re pregnant. You or your partner may notice changes in desire. Desire may be less in the first trimester, due to nausea and fatigue. In the second trimester, sex may be very enjoyable. The third trimester can be a challenge comfort-wise. Try different positions and see what s best for you both.  Date Last Reviewed: 10/1/2017    4410-9734 [employer]. [address] [place] [address], [place] [address]. All rights reserved. This information is not intended as a substitute for professional medical care. Always follow your healthcare professional's instructions.           Patient Education     Pregnancy: Your First  "Trimester Changes  The first trimester is a time of rapid development for your baby. Because your baby is growing so quickly, it is important that you start a healthy lifestyle right away. By the end of the first trimester, your baby has formed all of its major body organs and weighs just over an ounce.     Actual size of baby is 1/4\"    Month 1 (weeks 1 to 4)  The placenta (the organ that nourishes your baby) begins to form. The brain, spinal cord, heart, gastrointestinal tract, and lungs begin to develop. Your baby is about 1/4-inch long by the end of the first month.     Actual size of baby is 1\"      Month 2 (weeks 5 to 8)  All of your baby s major body organs form. The face, fingers, toes, ears, and eyes appear. By the end of the month, your baby is about 1-inch long.     Actual size of baby is 3\"      Month 3 (weeks 9 to 12)  Your baby can open and close its fists and mouth. The sexual organs begin to form. As the first trimester ends, your baby is about 3-inches long.  Date Last Reviewed: 10/1/2017    9121-2541 [employer]. [address] [place] [address]. All rights reserved. This information is not intended as a substitute for professional medical care. Always follow your healthcare professional's instructions.    [facility]  Phone: [phone]  Fax: [phone]    [facility]  [address] [place] [address]  [id number]    Appointment:  Friday May 31, 2019   Arrival Time:  12:15 pm     Make sure you have a full bladder     Please bring a copy of your insurance card and photo ID    If you cannot make this appointment, please call [phone] to reschedule    Referral and demographics faxed to [phone]                Options for treatment and/or follow-up care were reviewed with the patient. Sagrario Meyer was engaged and actively involved in the decision making process. She verbalized understanding of the options discussed and was satisfied with the final " plan.    Dee Webb MD

## 2019-06-06 ENCOUNTER — OFFICE VISIT (OUTPATIENT)
Dept: FAMILY MEDICINE | Facility: CLINIC | Age: 39
End: 2019-06-06
Payer: COMMERCIAL

## 2019-06-06 VITALS
SYSTOLIC BLOOD PRESSURE: 110 MMHG | DIASTOLIC BLOOD PRESSURE: 70 MMHG | WEIGHT: 130 LBS | TEMPERATURE: 98.2 F | RESPIRATION RATE: 20 BRPM | HEIGHT: 64 IN | HEART RATE: 82 BPM | BODY MASS INDEX: 22.2 KG/M2 | OXYGEN SATURATION: 99 %

## 2019-06-06 DIAGNOSIS — D25.2 SUBSEROUS LEIOMYOMA OF UTERUS: ICD-10-CM

## 2019-06-06 DIAGNOSIS — O09.529 SUPERVISION OF HIGH-RISK PREGNANCY OF ELDERLY MULTIGRAVIDA: Primary | ICD-10-CM

## 2019-06-06 ASSESSMENT — MIFFLIN-ST. JEOR: SCORE: 1252.43

## 2019-06-06 NOTE — PROGRESS NOTES
"    There are no exam notes on file for this visit.  Chief Complaint   Patient presents with     Abdominal Pain     f/u abdominal pain     Blood pressure 110/70, pulse 82, temperature 98.2  F (36.8  C), temperature source Oral, resp. rate 20, height 1.63 m (5' 4.17\"), weight 59 kg (130 lb), last menstrual period 03/25/2019, SpO2 99 %.                 HPI     Sagrario Meyer is a 38 year old  female with a PMH significant for:     Patient Active Problem List   Diagnosis     Cystic acne     Latent tuberculosis     Redness of right eye     Chronic right-sided low back pain without sciatica     Vitamin D deficiency     AMA (advanced maternal age) multigravida 35+     Supervision of high-risk pregnancy of elderly multigravida     Subserous leiomyoma of uterus     She presents with follow-up for abdominal pain.  She had ultrasound earlier this week to recheck a large fibroid.  She is about 10 weeks pregnant now.  We discussed the results of the ultrasound in detail on the phone yesterday again reviewed that the pregnancy is looking normal and viable.  Fibroid is 7 cm and not growing.  She continues to have pain if she walks or moves around too much.  She has been off work and resting.  She even is staying with a friend, Linh, who is helping to cook for her and help her not be so isolated and anxious by herself.  This is been extremely helpful for her.    She is tearful sometimes during the visit today as she is worried about not working, feeling very lonely at her home.  She is really trying to get her  to be able to immigrate to the [place] from [place].  She wanted me to rewrite letter in support of his immigration.  I do think since she is having complications with the pregnancy that it is medically indicated that she needs her  with her to help her out.    PMH, Medications and Allergies were reviewed and updated as needed.                Physical Exam:     Vitals:    06/06/19 1333   BP: 110/70   Pulse: 82 " "  Resp: 20   Temp: 98.2  F (36.8  C)   TempSrc: Oral   SpO2: 99%   Weight: 59 kg (130 lb)   Height: 1.63 m (5' 4.17\")     Body mass index is 22.19 kg/m .    Exam:  Constitutional: healthy, alert and no distress.  Psychiatric: mentation appears normal and affect tearful at times.            Assessment and Plan     Sagrario was seen today for abdominal pain.    Diagnoses and all orders for this visit:    Supervision of high-risk pregnancy of elderly multigravida  Subserous leiomyoma of uterus  -     OB/GYN REFERRAL; Future: Referred to OB/GYN since she continues to be very symptomatic with a very large subserosal fibroid that is 7 cm.    She does want to try to work again gave her a work note to return to work with significant restrictions.  Continue Tylenol heat and rest for pain.  I think she is having so much pain as the uterus grows and pushes the fibroid more into the abdomen.  Is worse with movement so sitting down job should be doable.        Patient Instructions   Call Linh or us for support when needed!    We are here for you!    Keep resting to avoid pain.    Follow-up as scheduled on 6/24/19.       Options for treatment and/or follow-up care were reviewed with the patient. Sagrario Meyer was engaged and actively involved in the decision making process. She verbalized understanding of the options discussed and was satisfied with the final plan.    Dee Webb MD  "

## 2019-06-06 NOTE — PATIENT INSTRUCTIONS
[facility]  [address]  [place], [place]     Phone: [phone]    APPT: Tuesday, 6/18/19 at 10:30 AM with Dr Nichols for consult regarding large subserosal fibroid and abd pain.    Call Linh or us for support when needed!    We are here for you!    Keep resting to avoid pain.

## 2019-06-06 NOTE — LETTER
RETURN TO WORK/SCHOOL FORM    6/6/2019    Re: Sagrario Meyer  1980      To Whom It May Concern:     Sagrario Meyer was seen in clinic today..  She may return to work with restrictions on 6/7/19          Restrictions:  limited to light duty - No lifting, no walking.  Only desk work at this time.  Will reassess on 6/24/19.      Dee Webb MD  6/6/2019 2:10 PM

## 2019-06-18 ENCOUNTER — TRANSFERRED RECORDS (OUTPATIENT)
Dept: HEALTH INFORMATION MANAGEMENT | Facility: CLINIC | Age: 39
End: 2019-06-18

## 2019-06-19 ENCOUNTER — AMBULATORY - HEALTHEAST (OUTPATIENT)
Dept: MATERNAL FETAL MEDICINE | Facility: HOSPITAL | Age: 39
End: 2019-06-19

## 2019-06-20 ENCOUNTER — TELEPHONE (OUTPATIENT)
Dept: FAMILY MEDICINE | Facility: CLINIC | Age: 39
End: 2019-06-20

## 2019-06-20 NOTE — TELEPHONE ENCOUNTER
"Pt is wondering why she will be seeing 3 different people charlie at [facility]?  Explained to her that she will be having an ultrasound by an ultrasound tech, then a genetic counselor to discuss genetic testing that could be done, and to see perinatologist to go over all of the results.      Asked pt how her appt went with Dr Nichols and she states that he told her that nothing could be done for treatment of the fibroid until after the baby was born.  At the time she was seeing him she was not having any pain but it has come back today.  She states that she feels the \"pushing down\" pain again like before but not as bad as before.  She states that Tylenol helps.  She is having some nausea but has not been taking Vitamin B6 and did not get the doxylamine.  Discussed that she can take the Vitamin B6 every 6 hrs.  Discussed that if pain is worsening or she has increase in nausea or vomiting to call the clinic.  She has an appt to see Dr Webb on Monday, 6/24/19.  Routed note to Dr Webb./JAVY  "

## 2019-06-21 ENCOUNTER — OFFICE VISIT - HEALTHEAST (OUTPATIENT)
Dept: MATERNAL FETAL MEDICINE | Facility: HOSPITAL | Age: 39
End: 2019-06-21

## 2019-06-21 ENCOUNTER — RECORDS - HEALTHEAST (OUTPATIENT)
Dept: ULTRASOUND IMAGING | Facility: HOSPITAL | Age: 39
End: 2019-06-21

## 2019-06-21 ENCOUNTER — RECORDS - HEALTHEAST (OUTPATIENT)
Dept: ADMINISTRATIVE | Facility: OTHER | Age: 39
End: 2019-06-21

## 2019-06-21 DIAGNOSIS — O26.90 PREGNANCY RELATED CONDITIONS, UNSPECIFIED, UNSPECIFIED TRIMESTER: ICD-10-CM

## 2019-06-21 DIAGNOSIS — O09.511 SUPERVISION OF ELDERLY PRIMIGRAVIDA IN FIRST TRIMESTER: ICD-10-CM

## 2019-06-21 DIAGNOSIS — O26.90 PREGNANCY, ANTEPARTUM, COMPLICATIONS: ICD-10-CM

## 2019-06-21 DIAGNOSIS — O34.10 UTERINE FIBROID IN PREGNANCY: ICD-10-CM

## 2019-06-21 DIAGNOSIS — O09.511 AMA (ADVANCED MATERNAL AGE) PRIMIGRAVIDA 35+, FIRST TRIMESTER: ICD-10-CM

## 2019-06-21 DIAGNOSIS — D25.9 UTERINE FIBROID IN PREGNANCY: ICD-10-CM

## 2019-06-24 ENCOUNTER — OFFICE VISIT (OUTPATIENT)
Dept: FAMILY MEDICINE | Facility: CLINIC | Age: 39
End: 2019-06-24
Payer: COMMERCIAL

## 2019-06-24 VITALS
TEMPERATURE: 97.9 F | HEART RATE: 88 BPM | BODY MASS INDEX: 22.23 KG/M2 | WEIGHT: 130.2 LBS | SYSTOLIC BLOOD PRESSURE: 99 MMHG | DIASTOLIC BLOOD PRESSURE: 68 MMHG | RESPIRATION RATE: 18 BRPM

## 2019-06-24 DIAGNOSIS — D25.2 SUBSEROUS LEIOMYOMA OF UTERUS: ICD-10-CM

## 2019-06-24 DIAGNOSIS — O09.529 SUPERVISION OF HIGH-RISK PREGNANCY OF ELDERLY MULTIGRAVIDA: Primary | ICD-10-CM

## 2019-06-24 DIAGNOSIS — R30.0 DYSURIA: ICD-10-CM

## 2019-06-24 LAB
BILIRUBIN UR: NEGATIVE
BLOOD UR: NEGATIVE
GLUCOSE URINE: NEGATIVE
KETONES UR QL: NEGATIVE
LEUKOCYTE ESTERASE UR: ABNORMAL
NITRITE UR QL STRIP: NEGATIVE
PH UR STRIP: 5.5 [PH] (ref 4.5–8)
PROTEIN UR: NEGATIVE
SP GR UR STRIP: 1.02 (ref 1–1.03)
UROBILINOGEN UR STRIP-ACNC: ABNORMAL

## 2019-06-24 NOTE — LETTER
RETURN TO WORK/SCHOOL FORM    6/24/2019    Re: Sagrario Meyer  1980      To Whom It May Concern:     Sagrario Meyer was seen in clinic today..  She may return to work with restrictions on 6/25/19          Restrictions:  limited to light duty - no lifting or pushing greater than 10 pounds.  Needs to work  due to medical condition at this time.  No walking more 50 ft at one time, needs to take breaks after walking.      Dee Webb MD  6/24/2019 11:01 AM

## 2019-06-24 NOTE — PATIENT INSTRUCTIONS
Try doxylamine 25mg to 50mg at bedtime to help with sleep.    The phone number for [facility] is [phone]
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
## 2019-06-25 ENCOUNTER — MEDICAL CORRESPONDENCE (OUTPATIENT)
Dept: HEALTH INFORMATION MANAGEMENT | Facility: CLINIC | Age: 39
End: 2019-06-25

## 2019-06-27 RX ORDER — NITROFURANTOIN 25; 75 MG/1; MG/1
100 CAPSULE ORAL 2 TIMES DAILY
Qty: 14 CAPSULE | Refills: 0 | Status: SHIPPED | OUTPATIENT
Start: 2019-06-27 | End: 2019-07-09

## 2019-06-27 NOTE — RESULT ENCOUNTER NOTE
Called patient and urine is still having more pressure and irritation after urinating.  Will treat for UTI. Come back sooner if not better.

## 2019-06-28 ENCOUNTER — TELEPHONE (OUTPATIENT)
Dept: FAMILY MEDICINE | Facility: CLINIC | Age: 39
End: 2019-06-28

## 2019-06-28 DIAGNOSIS — O09.529 SUPERVISION OF HIGH-RISK PREGNANCY OF ELDERLY MULTIGRAVIDA: ICD-10-CM

## 2019-06-28 DIAGNOSIS — R35.0 INCREASED FREQUENCY OF URINATION: Primary | ICD-10-CM

## 2019-06-28 DIAGNOSIS — R35.0 INCREASED FREQUENCY OF URINATION: ICD-10-CM

## 2019-06-28 LAB
BILIRUBIN UR: NEGATIVE
BLOOD UR: NEGATIVE
GLUCOSE URINE: NEGATIVE
KETONES UR QL: NEGATIVE
LEUKOCYTE ESTERASE UR: ABNORMAL
NITRITE UR QL STRIP: NEGATIVE
PH UR STRIP: 6.5 [PH] (ref 4.5–8)
PROTEIN UR: NEGATIVE
SP GR UR STRIP: 1.01 (ref 1–1.03)
UROBILINOGEN UR STRIP-ACNC: ABNORMAL

## 2019-06-28 NOTE — TELEPHONE ENCOUNTER
[facility] phone call message- general phone call:    Reason for call:     Pt has questions about her medications    Return call needed: Yes    OK to leave a message on voice mail? Yes    Advised patient to response may take up to 2 business days: Yes    Primary language: English      needed? No    Call taken on June 28, 2019 at 9:39 AM by Dora Chamberlain

## 2019-06-28 NOTE — LETTER
July 3, 2019      Sagrario Meyer  [address]   [place] [address]        Dear Sagrario,    Your urine does look like there may be an infection.  No bacteria grew on the culture, but I still want you to take all of the antibiotics to make sure the infection is completely treated.    Please see below for your test results.    Resulted Orders   Urine Culture ([facility])   Result Value Ref Range    Culture SEE RESULTS BELOW       Comment:      CULTURE, URINE   SOURCE: Urine, Clean Catch   CULTURE RESULTS:    No Growth      Narrative    Test performed by:  [facility]  [address] [place] [address]   Urinalysis, Micro If ([facility])   Result Value Ref Range    Specific Gravity Urine 1.015 1.005 - 1.030    pH Urine 6.5 4.5 - 8.0    Leukocyte Esterase UR 2+ (A) NEGATIVE    Nitrite Urine Negative NEGATIVE    Protein UR Negative NEGATIVE    Glucose Urine Negative NEGATIVE    Ketones Urine Negative NEGATIVE    Urobilinogen mg/dL 0.2 E.U./dL 0.2 E.U./dL    Bilirubin UR Negative NEGATIVE    Blood UR Negative NEGATIVE    Narrative    QNS FOR URINE MICRO. DL        If you have any questions, please call the clinic to make an appointment.    Sincerely,    Dee Webb MD

## 2019-06-28 NOTE — TELEPHONE ENCOUNTER
Pt states that Dr Webb called her yesterday and told her that she likely has a urine infection.  She states that Dr Webb sent an abx prescription to her pharmacy but she is afraid to take it.  Pt states that Dr Webb also told her to come in for another urine test.  Pt is wondering why?  Explained to pt that her UA on Monday showed +3 leukocytes but there was not enough urine to do further testing (micro and culture).  Since she is having symptoms including increased urinary frequency and seeing something floating in the urine that Dr Webb prescribed the abx and wants her to do the lab test for urine culture to ensure that the abx prescribed will get rid of the infection.  Pt verbalized understanding and states that she will come today between 2-3 pm to leave urine sample.  Told her that I can give her the UA results while she is here and then the urine culture takes a few days charlie come back.  Told her that the medication prescribed is safe in pregnancy.  Pt verbalized understanding.    LM for Dr Webb at [facility] to call back to discuss and place orders./NG

## 2019-06-29 LAB — CULTURE: NORMAL

## 2019-07-01 ENCOUNTER — TELEPHONE (OUTPATIENT)
Dept: FAMILY MEDICINE | Facility: CLINIC | Age: 39
End: 2019-07-01

## 2019-07-01 NOTE — RESULT ENCOUNTER NOTE
Your urine does look like there may be an infection.  No bacteria grew on the culture, but I still want you to take all of the antibiotics to make sure the infection is completely treated.

## 2019-07-01 NOTE — TELEPHONE ENCOUNTER
6/28/19 1:00 PM Discussed with Dr Webb and told her that the pt was coming for UA/UC and culture today.  Told Dr Webb that I will reiterate the need to take the abx as directed and that they are safe in pregnancy.    6/28/19 2:40 PM Gave UA results to pt and reiterated that she should take the Macrobid as directed.  Discussed that if she develops fever, chills, flank pain, blood in urine or any other concerns/questions to contact the clinic. Pt verbalized understanding.  Pt states that her work supervisor is having her go to other buildings and do a lot of walking despite the work restrictions from Dr Webb.  Her supervisor told her that if she does not feel comfortable doing it to ask others to help her but pt does not feel comfortable doing this.  Pt is going to speak with her manager about this. Told her to call if needed Dr Webb can write another work note./NG

## 2019-07-09 ENCOUNTER — TELEPHONE (OUTPATIENT)
Dept: FAMILY MEDICINE | Facility: CLINIC | Age: 39
End: 2019-07-09

## 2019-07-09 ENCOUNTER — OFFICE VISIT (OUTPATIENT)
Dept: FAMILY MEDICINE | Facility: CLINIC | Age: 39
End: 2019-07-09
Payer: COMMERCIAL

## 2019-07-09 VITALS
SYSTOLIC BLOOD PRESSURE: 99 MMHG | RESPIRATION RATE: 16 BRPM | OXYGEN SATURATION: 100 % | HEART RATE: 84 BPM | TEMPERATURE: 99.1 F | BODY MASS INDEX: 22.54 KG/M2 | DIASTOLIC BLOOD PRESSURE: 64 MMHG | WEIGHT: 132 LBS

## 2019-07-09 DIAGNOSIS — N89.8 VAGINAL DISCHARGE: Primary | ICD-10-CM

## 2019-07-09 DIAGNOSIS — B96.89 BACTERIAL VAGINOSIS: ICD-10-CM

## 2019-07-09 DIAGNOSIS — R10.2 PAIN OF ROUND LIGAMENT DURING PREGNANCY: ICD-10-CM

## 2019-07-09 DIAGNOSIS — O26.899 PAIN OF ROUND LIGAMENT DURING PREGNANCY: ICD-10-CM

## 2019-07-09 DIAGNOSIS — N76.0 BACTERIAL VAGINOSIS: ICD-10-CM

## 2019-07-09 DIAGNOSIS — D25.2 INTRAMURAL AND SUBSEROUS LEIOMYOMA OF UTERUS: ICD-10-CM

## 2019-07-09 DIAGNOSIS — B37.31 YEAST INFECTION OF THE VAGINA: ICD-10-CM

## 2019-07-09 DIAGNOSIS — D25.1 INTRAMURAL AND SUBSEROUS LEIOMYOMA OF UTERUS: ICD-10-CM

## 2019-07-09 LAB
BACTERIA: NORMAL
BILIRUBIN UR: NEGATIVE
BLOOD UR: NEGATIVE
CLUE CELLS: NORMAL
GLUCOSE URINE: NEGATIVE
KETONES UR QL: NEGATIVE
LEUKOCYTE ESTERASE UR: ABNORMAL
MOTILE TRICHOMONAS: NEGATIVE
NITRITE UR QL STRIP: NEGATIVE
ODOR: NORMAL
PH UR STRIP: 6 [PH] (ref 4.5–8)
PH WET PREP: NORMAL (ref 3.8–4.5)
PROTEIN UR: NEGATIVE
SP GR UR STRIP: <=1.005 (ref 1–1.03)
UROBILINOGEN UR STRIP-ACNC: ABNORMAL
WBC WET PREP: NORMAL (ref 2–5)
YEAST: PRESENT

## 2019-07-09 RX ORDER — MICONAZOLE NITRATE 2 %
1 CREAM WITH APPLICATOR VAGINAL AT BEDTIME
Qty: 45 G | Refills: 0 | Status: SHIPPED | OUTPATIENT
Start: 2019-07-09 | End: 2019-08-08

## 2019-07-09 RX ORDER — METRONIDAZOLE 500 MG/1
500 TABLET ORAL 2 TIMES DAILY
Qty: 14 TABLET | Refills: 0 | Status: SHIPPED | OUTPATIENT
Start: 2019-07-09 | End: 2019-08-08

## 2019-07-09 NOTE — TELEPHONE ENCOUNTER
"Pt is 15w1d pregnant and states since yesterday she has been having pain/pressure that starts in her \"private are\" and travels down her left leg and around her knee.  She states that the pain comes and goes and last happened 30 min ago.  She rates the pain at 8-9/10.  She was having vaginal itching last week which has since resolved.  She has noticed that her underwear feels wet but denies vaginal discharge and vaginal bleeding.  She states that she completed the abx for UTI on Friday, 7/5/19.  Pt denies burning with urination.  Pt would like to come in to be seen and appt made with Dr Harris at 2:30 PM today.  Routed note to Dr Harris and Dr Webb./JAVY  "

## 2019-07-09 NOTE — PROGRESS NOTES
Preceptor Attestation:   Patient seen, evaluated and discussed with the resident. I have verified the content of the note, which accurately reflects my assessment of the patient and the plan of care.   Supervising Physician:  Dee Webb MD

## 2019-07-09 NOTE — PROGRESS NOTES
Subjective   Concerns:     Lower abdominal cramping described as intermittent.  Started out on the right side and radiates to lower back, but now also on the left side radiating to low back.  Patient has tried Tylenol with some improvement.    Groin pain that started out on right side, described as sharp 8-9 out of 10 and intermittently throughout the day.  Pain is worse with walking and episodes each lasting about 10 minutes long.  Groin pain is now on the left side as well with similar exacerbations.    Patient also complaining of vaginal itchiness along with thin clear vaginal discharge.  She initially thought that it might of been urine, but she is sure that it is not urine.  Denies any vaginal pain or burning with urination.  Does endorse lower abdominal cramping as described above.  Negative history for kidney stones.  Has not had sexual intercourse over a month, as  is still in [place].  Denies any douching with soap.    ROS:  YES - Headache--very mild  No - Changes in vision  No - Chest Pain  No - Shortness of Breath  No - Nausea   No - Vomiting  YES - Abdominal pain--see above   No - Contractions  No - Dysuria   YES - Vaginal Discharge --see above  No - Vaginal bleeding   No - Loss of Fluid   No - Extremity swelling   Present - Fetal movement       Patient Active Problem List   Diagnosis     Cystic acne     Latent tuberculosis     Redness of right eye     Chronic right-sided low back pain without sciatica     Vitamin D deficiency     AMA (advanced maternal age) multigravida 35+     Supervision of high-risk pregnancy of elderly multigravida     Subserous leiomyoma of uterus       Sagrario Meyer speaks English so an  was not used today.    Guidance:    Yeast and bacterial infection during pregnancy  OTC medications  weight gain and exercise    Objective  BP 99/64 (BP Location: Left arm, Patient Position: Sitting, Cuff Size: Adult Regular)   Pulse 84   Temp 99.1  F (37.3  C) (Oral)   Resp  16   Wt 59.9 kg (132 lb)   LMP 2019 (Approximate)   SpO2 100%   BMI 22.54 kg/m    No distress.  Gravid abdomen.  Fundus palpable at the umbilicus.  no edema.    Results  Blood type: O POS  Results for orders placed or performed in visit on 19   Urinalysis ([facility])   Result Value Ref Range    Specific Gravity Urine <=1.005 1.005 - 1.030    pH Urine 6.0 4.5 - 8.0    Leukocyte Esterase UR 3+ (A) NEGATIVE    Nitrite Urine Negative NEGATIVE    Protein UR Negative NEGATIVE    Glucose Urine Negative NEGATIVE    Ketones Urine Negative NEGATIVE    Urobilinogen mg/dL 0.2 E.U./dL 0.2 E.U./dL    Bilirubin UR Negative NEGATIVE    Blood UR Negative NEGATIVE   Wet Prep ([facility])   Result Value Ref Range    Yeast Wet Prep Present none    Motile Trichomonas Wet Prep Negative Negative    Clue Cells Wet Prep Present <20% NONE    WBC WET PREP 10-25 2 - 5    Bacteria Wet Prep Moderate None    pH Wet Prep Not performed 3.8 - 4.5    Odor Wet Prep None NONE       Assessment & Plan  38 year old  at 15w1d with RIO Dec 30, 2019 based on LMP and15 week US    Sagrario was seen today for abdominal pain, prenatal care and medication reconciliation.    Diagnoses and all orders for this visit:    Yeast infection of the vagina  -miconazole (MONISTAT 7 SIMPLY CURE) 2 % cream; Place 1 applicator vaginally At Bedtime for 7 days    Bacterial vaginosis  -metroNIDAZOLE (FLAGYL) 500 MG tablet; Take 1 tablet (500 mg) by mouth 2 times daily for 7 days    Vaginal discharge  UA negative for UTI.  Wet prep positive for yeast and bacterial vaginosis.    Intramural and subserous leiomyoma of uterus  Pain of round ligament during pregnancy  -order for DME; Equipment being ordered: abdominal binder, measure for fit  -Tylenol, rest, ice/heat as needed for pain pain      Weight gain inadequate: 0.907 kg (2 lb) to date, out of recommended total of 25-35 lbs (pregravid BMI 18.5-24.9)      Return to clinic in 4 weeks for routine OB and follow up  anterior growth ultrasound every 6 weeks after comprehensive ultrasound at 18 weeks with maternal-fetal medicine.    Ranjan Arivzu MD  7/9/2019    Precepted with Dr. Webb.

## 2019-07-09 NOTE — LETTER
[facility]  [address] [address]  Phone: [phone]  Fax: [phone]    July 9, 2019        Sagrario Meyer  [address]   [place] [address]          To whom it may concern:    RE: Sagrario Meyer    Patient was seen and treated today at our clinic and missed work. Please excuse her from work duties today.    Please contact me for questions or concerns.      Sincerely,        Ranjan Arvizu MD

## 2019-07-09 NOTE — PATIENT INSTRUCTIONS
1. Take tylenol as needed for your pain  2. Also try heat, ice, and abdominal binder for the pain  3. Follow up in 4 weeks    We will call you with your results.      [facility]  [address], [place]   PHONE: [phone]    [facility]  [address], [place]   PHONE: [phone]    [facility]  [address], [place]  (across from [facility])  PHONE: [phone]    [facility]  Multiple locations in the [facility]  Main phone line: [phone]  Fax: [phone]

## 2019-07-10 LAB — CULTURE: NORMAL

## 2019-07-11 ENCOUNTER — TELEPHONE (OUTPATIENT)
Dept: FAMILY MEDICINE | Facility: CLINIC | Age: 39
End: 2019-07-11

## 2019-07-11 NOTE — TELEPHONE ENCOUNTER
Pt states that Dr Arvizu ordered her medicine to treat a yeast infection but she is not sure how to use the medication.  Tried to explain but pt still not sure.  Told her that she could come in and I can show her and further explain how she will use the applicator to put the cream into her vagina every night before bed for the next 7 nights. Pt states that she will come in this morning./NG

## 2019-07-11 NOTE — TELEPHONE ENCOUNTER
Pt walked in and showed her how to attach monistat cream tube to plunger to fill it up.  Showed her pictures on package insert and discussed how to insert tube into vagina and push plunger until all cream is out.  Discussed then tube removed and thrown away.  Discussed the need to empty bladder first and then insert medication right before going to bed.  Discussed that there may be some discomfort when inserting the tube and sometimes cream can cause a little burning/irritation. Discussed if it is too painful to contact the clinic.  Pt verbalized understanding.  Discussed that she should take the Metronidazole as directed on the bottle until it is gone./NG

## 2019-07-23 ENCOUNTER — TELEPHONE (OUTPATIENT)
Dept: FAMILY MEDICINE | Facility: CLINIC | Age: 39
End: 2019-07-23

## 2019-07-23 NOTE — TELEPHONE ENCOUNTER
[facility] phone call message-patient reporting a symptom:     Symptom:     Feeling of one side of her stomach is swelling.     Same Day Visit Offered: No    Additional comments: Return call.    OK to leave message on voice mail? Yes    Primary language: English      needed? No    Call taken on July 23, 2019 at 1:15 PM by Dora Chamberlain

## 2019-07-23 NOTE — TELEPHONE ENCOUNTER
"Pt is 17w1d pregnant and states that since yesterday when she lies down she feels a \"lemon\" sized lump.She states that it is just below her left breast.  She states that it is not painful and she otherwise does not notice it.  Scheduled pt to see Dr Webb on Monday, 7/29/19 at 9:40 AM.  Told her to monitor and call with concerns./NG  "

## 2019-07-29 ENCOUNTER — OFFICE VISIT (OUTPATIENT)
Dept: FAMILY MEDICINE | Facility: CLINIC | Age: 39
End: 2019-07-29
Payer: COMMERCIAL

## 2019-07-29 VITALS
HEIGHT: 64 IN | RESPIRATION RATE: 16 BRPM | BODY MASS INDEX: 23.05 KG/M2 | WEIGHT: 135 LBS | DIASTOLIC BLOOD PRESSURE: 64 MMHG | TEMPERATURE: 98.3 F | HEART RATE: 80 BPM | SYSTOLIC BLOOD PRESSURE: 97 MMHG

## 2019-07-29 DIAGNOSIS — D25.2 SUBSEROUS LEIOMYOMA OF UTERUS: ICD-10-CM

## 2019-07-29 DIAGNOSIS — O09.529 SUPERVISION OF HIGH-RISK PREGNANCY OF ELDERLY MULTIGRAVIDA: Primary | ICD-10-CM

## 2019-07-29 ASSESSMENT — PATIENT HEALTH QUESTIONNAIRE - PHQ9
SUM OF ALL RESPONSES TO PHQ QUESTIONS 1-9: 9
5. POOR APPETITE OR OVEREATING: SEVERAL DAYS

## 2019-07-29 ASSESSMENT — MIFFLIN-ST. JEOR: SCORE: 1272.36

## 2019-07-29 ASSESSMENT — ANXIETY QUESTIONNAIRES
5. BEING SO RESTLESS THAT IT IS HARD TO SIT STILL: SEVERAL DAYS
6. BECOMING EASILY ANNOYED OR IRRITABLE: SEVERAL DAYS
7. FEELING AFRAID AS IF SOMETHING AWFUL MIGHT HAPPEN: SEVERAL DAYS
1. FEELING NERVOUS, ANXIOUS, OR ON EDGE: SEVERAL DAYS
2. NOT BEING ABLE TO STOP OR CONTROL WORRYING: SEVERAL DAYS
GAD7 TOTAL SCORE: 7
3. WORRYING TOO MUCH ABOUT DIFFERENT THINGS: SEVERAL DAYS

## 2019-07-29 NOTE — LETTER
2019      RE: Sagrario Meyer  [address]   [place] [address]      To Whom It May Concern,    I am the physician for Sagrraio Meyer.  She is currently pregnant and has no family in the [place].  Her , Ash WALKER 1980, is currently in [place].  She is having pregnancy complications that require assistance of family to care for her. It is medically recommended that she have support during pregnancy and after the birth of her baby.  It is urgent to reunite this family in the [place] as soon as possible.     Sincerely,     Dee Webb MD

## 2019-07-29 NOTE — PROGRESS NOTES
"Subjective  Concerns: mass in left upper quadrant. nontender.    ROS:  No - Headache  No - Changes in vision  No - Chest Pain  No - Shortness of Breath  No - Nausea   No - Vomiting  YES - Abdominal pain This has been chronic with walking and likely due to fibroid. It is improved.  No - Contractions  No - Dysuria   No - Vaginal Discharge    No - Vaginal bleeding   No - Loss of Fluid   No - Extremity swelling   Absent - Fetal movement       Patient Active Problem List   Diagnosis     Cystic acne     Latent tuberculosis     Redness of right eye     Chronic right-sided low back pain without sciatica     Vitamin D deficiency     AMA (advanced maternal age) multigravida 35+     Supervision of high-risk pregnancy of elderly multigravida     Subserous leiomyoma of uterus       Sagrario Meyer speaks English so an  was not used today.    Guidance:  OTC medications  quickening  fetal survey ultrasound    Going to [facility]? Yes      Objective  BP 97/64   Pulse 80   Temp 98.3  F (36.8  C)   Resp 16   Ht 1.626 m (5' 4\")   Wt 61.2 kg (135 lb)   LMP 03/25/2019 (Approximate)   BMI 23.17 kg/m    No distress.  Gravid abdomen.  .  Fundal height 19 cm. Firm, nontender mobile mass felt in left upper quadrant, consistent with 9cm known fibroid.  no edema.    Results  Blood type: O POS  Results for orders placed or performed in visit on 07/09/19   Urinalysis ([facility])   Result Value Ref Range    Specific Gravity Urine <=1.005 1.005 - 1.030    pH Urine 6.0 4.5 - 8.0    Leukocyte Esterase UR 3+ (A) NEGATIVE    Nitrite Urine Negative NEGATIVE    Protein UR Negative NEGATIVE    Glucose Urine Negative NEGATIVE    Ketones Urine Negative NEGATIVE    Urobilinogen mg/dL 0.2 E.U./dL 0.2 E.U./dL    Bilirubin UR Negative NEGATIVE    Blood UR Negative NEGATIVE   Urine Culture ([facility])   Result Value Ref Range    Culture SEE RESULTS BELOW     Narrative    Test performed by:  [facility]  [address] [place] [address] "   Wet Prep ([facility])   Result Value Ref Range    Yeast Wet Prep Present none    Motile Trichomonas Wet Prep Negative Negative    Clue Cells Wet Prep Present <20% NONE    WBC WET PREP 10-25 2 - 5    Bacteria Wet Prep Moderate None    pH Wet Prep Not performed 3.8 - 4.5    Odor Wet Prep None NONE       Assessment & Plan  38 year old  at 18w0d with RIO Dec 30, 2019 based on 6 week US    Sagrario was seen today for prenatal care.    Diagnoses and all orders for this visit:    Supervision of high-risk pregnancy of elderly multigravida:  Doing better. Wrote another letter to help  come to the [place]. He was denied a VISA on the first round.    Subserous leiomyoma of uterus: Now felt in upper left quadrant due to uterine growth. Less pain than in the past. Follow up US at [facility] later this week already scheduled.        Weight gain adequate: 2.268 kg (5 lb) to date, out of recommended total of 25-35 lbs (pregravid BMI 18.5-24.9)    Return to clinic as scheduled.  Has fetal survey at [facility] at 20 weeks.    Dee Webb MD

## 2019-07-30 ASSESSMENT — ANXIETY QUESTIONNAIRES: GAD7 TOTAL SCORE: 7

## 2019-08-02 ENCOUNTER — OFFICE VISIT - HEALTHEAST (OUTPATIENT)
Dept: MATERNAL FETAL MEDICINE | Facility: HOSPITAL | Age: 39
End: 2019-08-02

## 2019-08-02 ENCOUNTER — RECORDS - HEALTHEAST (OUTPATIENT)
Dept: ULTRASOUND IMAGING | Facility: HOSPITAL | Age: 39
End: 2019-08-02

## 2019-08-02 ENCOUNTER — RECORDS - HEALTHEAST (OUTPATIENT)
Dept: ADMINISTRATIVE | Facility: OTHER | Age: 39
End: 2019-08-02

## 2019-08-02 DIAGNOSIS — O34.10 UTERINE FIBROID IN PREGNANCY: ICD-10-CM

## 2019-08-02 DIAGNOSIS — D25.9 LEIOMYOMA OF UTERUS, UNSPECIFIED: ICD-10-CM

## 2019-08-02 DIAGNOSIS — O09.512 AMA (ADVANCED MATERNAL AGE) PRIMIGRAVIDA 35+, SECOND TRIMESTER: ICD-10-CM

## 2019-08-02 DIAGNOSIS — O34.10 MATERNAL CARE FOR BENIGN TUMOR OF CORPUS UTERI, UNSPECIFIED TRIMESTER: ICD-10-CM

## 2019-08-02 DIAGNOSIS — D25.9 UTERINE FIBROID IN PREGNANCY: ICD-10-CM

## 2019-08-08 ENCOUNTER — OFFICE VISIT (OUTPATIENT)
Dept: FAMILY MEDICINE | Facility: CLINIC | Age: 39
End: 2019-08-08
Payer: COMMERCIAL

## 2019-08-08 VITALS
HEART RATE: 82 BPM | SYSTOLIC BLOOD PRESSURE: 97 MMHG | TEMPERATURE: 98.1 F | DIASTOLIC BLOOD PRESSURE: 65 MMHG | BODY MASS INDEX: 23.49 KG/M2 | RESPIRATION RATE: 16 BRPM | WEIGHT: 137.6 LBS | HEIGHT: 64 IN

## 2019-08-08 DIAGNOSIS — K21.9 GASTROESOPHAGEAL REFLUX DISEASE WITHOUT ESOPHAGITIS: ICD-10-CM

## 2019-08-08 DIAGNOSIS — O09.529 SUPERVISION OF HIGH-RISK PREGNANCY OF ELDERLY MULTIGRAVIDA: Primary | ICD-10-CM

## 2019-08-08 DIAGNOSIS — D25.2 SUBSEROUS LEIOMYOMA OF UTERUS: ICD-10-CM

## 2019-08-08 ASSESSMENT — PATIENT HEALTH QUESTIONNAIRE - PHQ9: SUM OF ALL RESPONSES TO PHQ QUESTIONS 1-9: 8

## 2019-08-08 ASSESSMENT — MIFFLIN-ST. JEOR: SCORE: 1284.15

## 2019-08-08 NOTE — LETTER
2019      RE: Sagrario Prescottjesseniaradha  [address]   [place] [address]      To Whom It May Concern,    Sagrario Meyer,  1980, is my patient and she is currently pregnant.  Due to the pregnancy, she is having trouble with nausea, vomiting and weight gain due to smells of food and cooking.  Sleeping in the same room as her kitchen in her current studio apartment is making this worse.  It is medically necessary that she switch to a one bedroom apartment to avoid the food smells and help her to avoid nausea and vomiting. Please contact me with questions.        Sincerely,    Dee Webb MD

## 2019-08-08 NOTE — PROGRESS NOTES
"Subjective  Concerns: Having back pain but not doing her home exercises.  And groin/round ligament pain when walking.  Discussed that this can be normal.  At times there is still asking her to do things at work that are too hard for her.  Including pushing a wheelchair.  Discussed that she needs to tell them that this is something she can do.    She requests assistance with getting a one bedroom apartment as she is currently in a studio.  She gets extremely nauseous and is continued to vomit and having trouble with eating due to the smells from cooking in the kitchen.  The bedroom she would be able to escape the smells by closing the door the bedroom.      ROS:  No - Headache  No - Changes in vision  No - Chest Pain  No - Shortness of Breath  YES - Nausea   YES - Vomiting  YES - Abdominal pain: As above also has some reflux.  No - Contractions  No - Dysuria   No - Vaginal Discharge    No - Vaginal bleeding   No - Loss of Fluid   No - Extremity swelling   Absent - Fetal movement       Patient Active Problem List   Diagnosis     Cystic acne     Latent tuberculosis     Redness of right eye     Chronic right-sided low back pain without sciatica     Vitamin D deficiency     AMA (advanced maternal age) multigravida 35+     Supervision of high-risk pregnancy of elderly multigravida     Subserous leiomyoma of uterus       Sagrario Meyer speaks English so an  was not used today.    Guidance:  weight gain and exercise  quickening  child birth education  fetal survey ultrasound    Going to [facility]? Yes      Objective  BP 97/65   Pulse 82   Temp 98.1  F (36.7  C)   Resp 16   Ht 1.626 m (5' 4\")   Wt 62.4 kg (137 lb 9.6 oz)   LMP 03/25/2019 (Approximate)   BMI 23.62 kg/m    No distress.  Gravid abdomen.  .  Fundal height 20 cm.  no edema.    Results  Blood type: O POS  Results for orders placed or performed in visit on 07/09/19   Urinalysis ([facility])   Result Value Ref Range    Specific Gravity Urine <=1.005 " 1.005 - 1.030    pH Urine 6.0 4.5 - 8.0    Leukocyte Esterase UR 3+ (A) NEGATIVE    Nitrite Urine Negative NEGATIVE    Protein UR Negative NEGATIVE    Glucose Urine Negative NEGATIVE    Ketones Urine Negative NEGATIVE    Urobilinogen mg/dL 0.2 E.U./dL 0.2 E.U./dL    Bilirubin UR Negative NEGATIVE    Blood UR Negative NEGATIVE   Urine Culture ([facility])   Result Value Ref Range    Culture SEE RESULTS BELOW     Narrative    Test performed by:  [facility] [address] [place] [address]   Wet Prep ([facility])   Result Value Ref Range    Yeast Wet Prep Present none    Motile Trichomonas Wet Prep Negative Negative    Clue Cells Wet Prep Present <20% NONE    WBC WET PREP 10-25 2 - 5    Bacteria Wet Prep Moderate None    pH Wet Prep Not performed 3.8 - 4.5    Odor Wet Prep None NONE       Assessment & Plan  38 year old  at 19w3d with RIO Dec 30, 2019 based on LMP and confirmed by  6 week US    Sagrario was seen today for prenatal care.    Diagnoses and all orders for this visit:    Supervision of high-risk pregnancy of elderly multigravida: Had normal fetal survey at [facility].  Having a boy.  Doing well.  Wrote letter to get a 1 bedroom apartment due to of her nausea.    Subserous leiomyoma of uterus: Continues to be about 9 cm.  Plan to remove after pregnancy.  Discussed supportive cares to help with this discomfort.    Gastroesophageal reflux disease without esophagitis: Having increased heartburn especially in the morning or after acidic foods.  Discussed using ranitidine as needed and may be using prophylactically at night to help with morning time nausea.  -     ranitidine (ZANTAC) 150 MG tablet; Take 1 tablet (150 mg) by mouth 2 times daily as needed for heartburn    Weight gain adequate: 3.447 kg (7 lb 9.6 oz) to date, out of recommended total of 25-35 lbs (pregravid BMI 18.5-24.9)    Return to clinic in 4 weeks.    Dee Webb MD

## 2019-08-19 ENCOUNTER — TELEPHONE (OUTPATIENT)
Dept: FAMILY MEDICINE | Facility: CLINIC | Age: 39
End: 2019-08-19

## 2019-08-19 DIAGNOSIS — O09.529 SUPERVISION OF HIGH-RISK PREGNANCY OF ELDERLY MULTIGRAVIDA: Primary | ICD-10-CM

## 2019-08-19 NOTE — TELEPHONE ENCOUNTER
Called and spoke with Nilsa at [facility].  She states that [facility] will need a new order for pregnancy support belt.  NEED to include ht and weight and fax rx to [phone].  Routed note to Dr Webb./JAVY

## 2019-08-19 NOTE — TELEPHONE ENCOUNTER
[facility] phone call message- medication clarification/question:    Full Medication Name:     Abdominal binder    Question:     Can it be switched to support belt?     Megan from [employer] can take verbal order.      Pharmacy confirmed as   [facility]  Yes    OK to leave a message on voice mail? Yes    Primary language: English      needed? No    Call taken on August 19, 2019 at 12:28 PM by Dora Chamberlain

## 2019-08-20 ENCOUNTER — TELEPHONE (OUTPATIENT)
Dept: FAMILY MEDICINE | Facility: CLINIC | Age: 39
End: 2019-08-20

## 2019-08-20 ENCOUNTER — MEDICAL CORRESPONDENCE (OUTPATIENT)
Dept: HEALTH INFORMATION MANAGEMENT | Facility: CLINIC | Age: 39
End: 2019-08-20

## 2019-08-20 ENCOUNTER — OFFICE VISIT (OUTPATIENT)
Dept: FAMILY MEDICINE | Facility: CLINIC | Age: 39
End: 2019-08-20
Payer: COMMERCIAL

## 2019-08-20 VITALS
OXYGEN SATURATION: 98 % | WEIGHT: 141.8 LBS | HEIGHT: 64 IN | TEMPERATURE: 98.4 F | DIASTOLIC BLOOD PRESSURE: 64 MMHG | HEART RATE: 89 BPM | BODY MASS INDEX: 24.21 KG/M2 | RESPIRATION RATE: 16 BRPM | SYSTOLIC BLOOD PRESSURE: 96 MMHG

## 2019-08-20 DIAGNOSIS — B37.31 YEAST INFECTION OF THE VAGINA: ICD-10-CM

## 2019-08-20 DIAGNOSIS — R10.9 ABDOMINAL PAIN AFFECTING PREGNANCY: Primary | ICD-10-CM

## 2019-08-20 DIAGNOSIS — O26.899 ABDOMINAL PAIN AFFECTING PREGNANCY: Primary | ICD-10-CM

## 2019-08-20 DIAGNOSIS — O09.529 AMA (ADVANCED MATERNAL AGE) MULTIGRAVIDA 35+: ICD-10-CM

## 2019-08-20 LAB
BACTERIA: NORMAL
BACTERIA: NORMAL
BILIRUBIN UR: NEGATIVE
BLOOD UR: NEGATIVE
CASTS: NORMAL /LPF
CLUE CELLS: NORMAL
CRYSTAL URINE: NORMAL /LPF
EPITHELIAL CELLS UR: NORMAL /LPF (ref 0–2)
GLUCOSE URINE: ABNORMAL
KETONES UR QL: NEGATIVE
LEUKOCYTE ESTERASE UR: ABNORMAL
MOTILE TRICHOMONAS: NEGATIVE
MUCOUS URINE: NORMAL LPF
NITRITE UR QL STRIP: NEGATIVE
ODOR: NORMAL
PH UR STRIP: 7 [PH] (ref 4.5–8)
PH WET PREP: NORMAL (ref 3.8–4.5)
PROTEIN UR: NEGATIVE
RBC URINE: NORMAL /HPF
SP GR UR STRIP: 1.01 (ref 1–1.03)
UROBILINOGEN UR STRIP-ACNC: ABNORMAL
WBC URINE: NORMAL /HPF
WBC WET PREP: NORMAL (ref 2–5)
YEAST: PRESENT

## 2019-08-20 RX ORDER — CLOTRIMAZOLE 1 %
1 CREAM WITH APPLICATOR VAGINAL AT BEDTIME
Qty: 45 G | Refills: 0 | Status: SHIPPED | OUTPATIENT
Start: 2019-08-20 | End: 2019-09-09

## 2019-08-20 ASSESSMENT — MIFFLIN-ST. JEOR: SCORE: 1309.07

## 2019-08-20 NOTE — PROGRESS NOTES
"Subjective  Concerns:  Abdominal pain is worse since Sunday. Feels aching in suprapubic area and cramping or headache like feeling,  Does not think it is contractions. Underwear a little wet sometimes but doesn't notice when this happens only when changing clothes. Pain is radiating to her low back.      ROS:  No - Headache  No - Changes in vision  No - Chest Pain  No - Shortness of Breath  YES - Nausea Same as before but not frequent.  No - Vomiting  YES - Abdominal pain as above.  Not sexually active.  No - Contractions  No - Dysuria   No - Vaginal Discharge    No - Vaginal bleeding   No - Loss of Fluid   No - Extremity swelling   Present - Fetal movement       Patient Active Problem List   Diagnosis     Cystic acne     Latent tuberculosis     Redness of right eye     Chronic right-sided low back pain without sciatica     Vitamin D deficiency     AMA (advanced maternal age) multigravida 35+     Supervision of high-risk pregnancy of elderly multigravida     Subserous leiomyoma of uterus       Sagrario Meyer speaks English so an  was not used today.    Guidance:  quickening    [facility]? [facility]      Objective  BP 96/64 (BP Location: Left arm)   Pulse 89   Temp 98.4  F (36.9  C) (Oral)   Resp 16   Ht 1.635 m (5' 4.37\")   Wt 64.3 kg (141 lb 12.8 oz)   LMP 03/25/2019 (Approximate)   SpO2 98%   BMI 24.06 kg/m    No distress.  Gravid abdomen.  .  Fundal height 21 cm.  no edema.  Abdomen tender in suprapubic area.  Very uncomfortable with wet prep and did not tolerate cervical exam due to pain.  White discharge and erythematous mucosa noted in vulva. No odor or purulence seen.    Results  Blood type: O POS  Results for orders placed or performed in visit on 08/20/19   Wet Prep ([facility])   Result Value Ref Range    Yeast Wet Prep Present none    Motile Trichomonas Wet Prep Negative Negative    Clue Cells Wet Prep None NONE    WBC WET PREP 5-10 2 - 5    Bacteria Wet Prep Moderate None    pH Wet Prep " Not performed 3.8 - 4.5    Odor Wet Prep None NONE   Urinalysis, Micro If ([facility])   Result Value Ref Range    Specific Gravity Urine 1.010 1.005 - 1.030    pH Urine 7.0 4.5 - 8.0    Leukocyte Esterase UR 1+ (A) NEGATIVE    Nitrite Urine Negative NEGATIVE    Protein UR Negative NEGATIVE    Glucose Urine Trace (A) NEGATIVE    Ketones Urine Negative NEGATIVE    Urobilinogen mg/dL 0.2 E.U./dL 0.2 E.U./dL    Bilirubin UR Negative NEGATIVE    Blood UR Negative NEGATIVE   Urine Microscopic ([facility])   Result Value Ref Range    WBC Urine 2-5 <5 /hpf    RBC Urine None <5 /hpf    Epithelial Cells UR 10-25 0 - 2 /lpf    Mucous Urine None NONE lpf    Casts Urine None NONE /lpf    Crystal Urine None NONE /lpf    Bacteria Wet Prep Moderate None    Narrative    Yeast present. DL        Assessment & Plan  38 year old  at 21w1d with RIO Dec 30, 2019 based on 6 week US    Sagrario was seen today for pain and other.    Diagnoses and all orders for this visit:    Abdominal pain affecting pregnancy: Urine normal ,but has yeast again.  Treatment as below. Urine culture sent as well. Wrote letter. Does not seem to be contractions or systemic illness. Low risk of PID since not sexually active.  -     Wet Prep ([facility])  -     Urinalysis, Micro If ([facility])  -     Urine Culture ([facility])  -     Urine Microscopic ([facility])  -     clotrimazole (LOTRIMIN) 1 % vaginal cream; Place 1 Applicatorful vaginally At Bedtime for 7 days    Yeast infection of the vagina: treating again for yeast.  Switching to clotrimazole this time.  -     clotrimazole (LOTRIMIN) 1 % vaginal cream; Place 1 Applicatorful vaginally At Bedtime for 7 days        Weight gain adequate: 5.352 kg (11 lb 12.8 oz) to date, out of recommended total of 25-35 lbs (pregravid BMI 18.5-24.9)    Patient Instructions   Use yeast medicine.     Rest and hydrate.    Come back if not better by Friday or worse.      Return to clinic in 2 weeks as scheduled    Dee Webb MD

## 2019-08-20 NOTE — LETTER
August 23, 2019      Sagrario Meyer  [address]   [place] [address]        Dear Sagrario,    Please see below for your test results.    Resulted Orders   Wet Prep ([facility])   Result Value Ref Range    Yeast Wet Prep Present none    Motile Trichomonas Wet Prep Negative Negative    Clue Cells Wet Prep None NONE    WBC WET PREP 5-10 2 - 5    Bacteria Wet Prep Moderate None    pH Wet Prep Not performed 3.8 - 4.5    Odor Wet Prep None NONE   Urinalysis, Micro If ([facility])   Result Value Ref Range    Specific Gravity Urine 1.010 1.005 - 1.030    pH Urine 7.0 4.5 - 8.0    Leukocyte Esterase UR 1+ (A) NEGATIVE    Nitrite Urine Negative NEGATIVE    Protein UR Negative NEGATIVE    Glucose Urine Trace (A) NEGATIVE    Ketones Urine Negative NEGATIVE    Urobilinogen mg/dL 0.2 E.U./dL 0.2 E.U./dL    Bilirubin UR Negative NEGATIVE    Blood UR Negative NEGATIVE   Urine Culture ([facility])   Result Value Ref Range    Culture SEE RESULTS BELOW       Comment:      CULTURE, URINE   SOURCE: Urine, Clean Catch   CULTURE RESULTS:    No Growth      Narrative    Test performed by:  [facility]  [address] [place] [address]   Urine Microscopic ([facility])   Result Value Ref Range    WBC Urine 2-5 <5 /hpf    RBC Urine None <5 /hpf    Epithelial Cells UR 10-25 0 - 2 /lpf    Mucous Urine None NONE lpf    Casts Urine None NONE /lpf    Crystal Urine None NONE /lpf    Bacteria Wet Prep Moderate None    Narrative    Yeast present. DL      Your urine culture was negative for infection.    If you have any questions, please call the clinic to make an appointment.    Sincerely,    Dee Webb MD

## 2019-08-20 NOTE — TELEPHONE ENCOUNTER
"Spoke with patient who states that she is not feeling good.  She c/o of having back pressure and then right sided belly pressure. She states that the pressure has increased from the bottom of her \"belly into my vagina area\".  She states that its \"worse than when she was here before\"  (patient last ov was 8/8/19).  She states that she is feeling fetal movement and denies any discharge, spotting or bleeding.  Does c/o increase nausea over the past day but is eating/drinking.  Patient did sound very \"winded\" when talking with RN.    Patient wants a note for her employer.  Encouraged patient to come into clinic to be assessed.  Patient agreeable and is scheduled to come in today at 2:30pm    Routed to Dr. Webb/Park Willams/RACHELE Latif RN    "

## 2019-08-20 NOTE — LETTER
RETURN TO WORK/SCHOOL FORM    8/20/2019    Re: Sagrario Meyer  1980      To Whom It May Concern:     Sagrario Meyer was seen in clinic today..  She may return to work with same  restrictions as last letter on 8/23/19         Dee Webb MD  8/20/2019 3:59 PM

## 2019-08-20 NOTE — TELEPHONE ENCOUNTER
[facility] phone call message- general phone call:    Reason for call: Pt not feeling well    Action desired: talk to nurse     Return call needed: No    OK to leave a message on voice mail? No    Advised patient to response may take up to 2 business days: Yes    Primary language: English      needed? No    Call taken on August 20, 2019 at 12:56 PM by Cielo Granados   Alert and oriented to person, place and time

## 2019-08-21 LAB — CULTURE: NORMAL

## 2019-08-22 ENCOUNTER — TELEPHONE (OUTPATIENT)
Dept: FAMILY MEDICINE | Facility: CLINIC | Age: 39
End: 2019-08-22

## 2019-08-22 NOTE — TELEPHONE ENCOUNTER
Patient called stating her pressure is worse she now describes it as pain. Patient directed to ER to be evaluated. Patient in agreement to go to ER    Ernestina LINDSEY

## 2019-08-22 NOTE — TELEPHONE ENCOUNTER
[facility] phone call message-patient reporting a symptom:     Symptom:     Pt states she is still having pressure and not feeling good. She wants a letter for work to extend the note she received during her visit.     Same Day Visit Offered: No    Additional comments: None    OK to leave message on voice mail? Yes    Primary language: English      needed? No    Call taken on August 22, 2019 at 1:40 PM by Dora Chamberlain CMA

## 2019-08-22 NOTE — TELEPHONE ENCOUNTER
Patient requesting additional time off of work. She states she continues to have nausea and vomiting she states the pressure she has been experiencing has increased. She is requesting an additional week off.  She will need a letter she states she will need it before tomorrow morning when she will be due at work.    Note routed to Dr.Wicks Ernestina LINSDEY

## 2019-08-26 ENCOUNTER — OFFICE VISIT (OUTPATIENT)
Dept: FAMILY MEDICINE | Facility: CLINIC | Age: 39
End: 2019-08-26
Payer: COMMERCIAL

## 2019-08-26 ENCOUNTER — TELEPHONE (OUTPATIENT)
Dept: FAMILY MEDICINE | Facility: CLINIC | Age: 39
End: 2019-08-26

## 2019-08-26 VITALS
WEIGHT: 137 LBS | BODY MASS INDEX: 23.25 KG/M2 | SYSTOLIC BLOOD PRESSURE: 110 MMHG | DIASTOLIC BLOOD PRESSURE: 64 MMHG | TEMPERATURE: 98.4 F | OXYGEN SATURATION: 100 % | HEART RATE: 93 BPM | RESPIRATION RATE: 20 BRPM

## 2019-08-26 DIAGNOSIS — O09.529 SUPERVISION OF HIGH-RISK PREGNANCY OF ELDERLY MULTIGRAVIDA: Primary | ICD-10-CM

## 2019-08-26 RX ORDER — TRAMADOL HYDROCHLORIDE 50 MG/1
50 TABLET ORAL PRN
COMMUNITY
Start: 2019-08-22 | End: 2019-09-09

## 2019-08-26 NOTE — TELEPHONE ENCOUNTER
Patient transferred to call center to schedule appt to discuss back pain and need for time off work    Ernestina LINDSEY

## 2019-08-26 NOTE — TELEPHONE ENCOUNTER
"Patient requesting a work excuse for 1-2 weeks off due to increasing pain \" around her private area\". Patient reports increase pain when she stands or walks. Patient denies spotting or bleeding, states she does feel the baby move, states her last bowel movement was yesterday, states she has fever occasionally but not now,denies pain or burning with urination. Patient was seen in ER on Thursday of last week was given medication for similar c/o pain, she reports medicine does not help.     Note routed to Dr. Tracey Do RN  "

## 2019-08-26 NOTE — LETTER
August 26, 2019      Sagrario Meyer  [address]   [place] [address]    Please excuse Sagrario Meyer from work 8/20/2019-9/2/2019. If her symptoms improve, she may return to work on 9/3/2019.    Sincerely,            Bambi Metz MD

## 2019-08-26 NOTE — TELEPHONE ENCOUNTER
[facility] phone call message-patient reporting a symptom:     Symptom: PAIN     Same Day Visit Offered: WOULD LIKE TO TALK TO THE DR      Additional comments:     OK to leave message on voice mail? Yes    Primary language: English      needed? No    Call taken on August 26, 2019 at 8:47 AM by Damaso Gonzalez

## 2019-08-26 NOTE — TELEPHONE ENCOUNTER
Pt is calling back because she is experiencing a lot of back pain now. She is wanting a return call from a nurse.

## 2019-09-04 NOTE — PROGRESS NOTES
Subjective  She reports 2 weeks of nausea and vomiting as well as suprapubic and back pain. She also endorses headache. She was evaluated in clinic for these symptoms on  and was found to have a yeast infection at that time. She was seen in the ED on . She reports that they told her to stop the treatment for the yeast infection at that time, because her urine was negative. She only completed 1 out of the prescribed 7 doses. She is requesting a work note to excuse her until her next OB appointment. She is declining any  examination today stating that she had lots of difficulty and discomfort with the exam on .    ROS:  YES - Headache  No - Changes in vision  No - Chest Pain  No - Shortness of Breath  YES - Nausea  YES - Vomiting  YES - Abdominal pain  No - Contractions  No - Dysuria   No - Vaginal Discharge    No - Vaginal bleeding   No - Loss of Fluid   No - Extremity swelling   Present - Fetal movement    Patient Active Problem List   Diagnosis     Cystic acne     Latent tuberculosis     Redness of right eye     Chronic right-sided low back pain without sciatica     Vitamin D deficiency     AMA (advanced maternal age) multigravida 35+     Supervision of high-risk pregnancy of elderly multigravida     Subserous leiomyoma of uterus     Objective  /64   Pulse 93   Temp 98.4  F (36.9  C) (Oral)   Resp 20   Wt 62.1 kg (137 lb)   LMP 2019 (Approximate)   SpO2 100%   BMI 23.25 kg/m    Mild distress, did have small emesis during visit.  Gravid abdomen.  . No edema.  Abdomen tender in suprapubic area.    Assessment & Plan  38 year old  at 22w0d with RIO Dec 30, 2019 based on 6 week US    Yeast infection of the vagina:  -     clotrimazole (LOTRIMIN) 1 % vaginal cream; Place 1 Applicatorful vaginally At Bedtime for 7 days    Abdominal pain affecting pregnancy:   Discussed that untreated yeast infection can result in abdominal pain. Does not seem to be contractions or systemic  illness. Low risk of PID since not sexually active. She had requested a work excuse for multiple weeks. Discussed that if the pain was preventing her from working for that long, I would want her to follow-up. Since she expressed frustration with appointment availability, I had her schedule a follow-up appointment and wrote a work excuse until then. I instructed her to cancel that appointment if her symptoms improved and return to her regular OB check schedule.    Bambi Metz MD PGY2

## 2019-09-06 NOTE — PROGRESS NOTES
Preceptor Attestation:   Patient seen, evaluated and discussed with the resident. I have verified the content of the note, which accurately reflects my assessment of the patient and the plan of care.   Supervising Physician:  Zackary Bledsoe MD MD

## 2019-09-09 ENCOUNTER — OFFICE VISIT (OUTPATIENT)
Dept: FAMILY MEDICINE | Facility: CLINIC | Age: 39
End: 2019-09-09
Payer: COMMERCIAL

## 2019-09-09 VITALS
SYSTOLIC BLOOD PRESSURE: 96 MMHG | BODY MASS INDEX: 24.14 KG/M2 | TEMPERATURE: 98.2 F | HEART RATE: 82 BPM | DIASTOLIC BLOOD PRESSURE: 65 MMHG | HEIGHT: 64 IN | RESPIRATION RATE: 16 BRPM | WEIGHT: 141.4 LBS

## 2019-09-09 DIAGNOSIS — O09.529 AMA (ADVANCED MATERNAL AGE) MULTIGRAVIDA 35+: ICD-10-CM

## 2019-09-09 DIAGNOSIS — K21.9 GASTROESOPHAGEAL REFLUX DISEASE WITHOUT ESOPHAGITIS: ICD-10-CM

## 2019-09-09 DIAGNOSIS — O21.9 VOMITING OR NAUSEA OF PREGNANCY: ICD-10-CM

## 2019-09-09 DIAGNOSIS — D25.2 SUBSEROUS LEIOMYOMA OF UTERUS: ICD-10-CM

## 2019-09-09 DIAGNOSIS — O09.529 SUPERVISION OF HIGH-RISK PREGNANCY OF ELDERLY MULTIGRAVIDA: Primary | ICD-10-CM

## 2019-09-09 LAB
GLU GEST SCREEN 1HR 50G: 60 (ref 0–129)
HEMOGLOBIN: 11 G/DL (ref 11.7–15.7)

## 2019-09-09 RX ORDER — ALUMINA, MAGNESIA, AND SIMETHICONE 2400; 2400; 240 MG/30ML; MG/30ML; MG/30ML
30 SUSPENSION ORAL 3 TIMES DAILY PRN
Qty: 769 ML | Refills: 11 | Status: ON HOLD | OUTPATIENT
Start: 2019-09-09 | End: 2020-01-02

## 2019-09-09 RX ORDER — ONDANSETRON 4 MG/1
4 TABLET, ORALLY DISINTEGRATING ORAL EVERY 8 HOURS PRN
Qty: 20 TABLET | Refills: 0 | Status: ON HOLD | OUTPATIENT
Start: 2019-09-09 | End: 2020-01-02

## 2019-09-09 ASSESSMENT — MIFFLIN-ST. JEOR: SCORE: 1305.36

## 2019-09-09 ASSESSMENT — PATIENT HEALTH QUESTIONNAIRE - PHQ9: SUM OF ALL RESPONSES TO PHQ QUESTIONS 1-9: 8

## 2019-09-09 NOTE — LETTER
September 10, 2019      Sagrario Meyer  [address]   [place] [address]        Dear Sagrario,    Please see below for your test results.    Resulted Orders   Glucose Challenge  1 Hr  ([facility])   Result Value Ref Range    Glu Gest Screen 1hr 50g 60 0 - 129   Syphilis Screen [place] ([facility])   Result Value Ref Range    Treponema Antibody (Syphilis) Negative Negative    Narrative    Test performed by:  [facility]  [address] [place] [address]   Hemoglobin (HGB) (P )   Result Value Ref Range    Hemoglobin 11.0 (L) 11.7 - 15.7 g/dL     All of your tests were normal and reassuring.      If you have any questions, please call the clinic to make an appointment.    Sincerely,    Dee Webb MD

## 2019-09-09 NOTE — PROGRESS NOTES
"Subjective  Concerns: Having a lot of heartburn. Worse after eating.  Taking ranitidine intermittently.     ROS:  No - Headache  No - Changes in vision  No - Chest Pain  No - Shortness of Breath  YES - Nausea   YES - Vomiting  YES - Abdominal pain Heartburn as above  No - Contractions  No - Dysuria   No - Vaginal Discharge    No - Vaginal bleeding   No - Loss of Fluid   No - Extremity swelling   Present - Fetal movement     Going to WIC? Yes      Risk Assessment   Average Risk Category  No significant risk factors: No    At Risk Category (up to 3)  Teen pregnancy: No  Poor social situation: No  Domestic abuse: No  Financial difficulties: No  Smoker: No  H/O  deliver: No  H/O drug abuse: No  Non-English speaking: No  Advanced maternal age: Yes  GDM risks: No  Previous C/S: No  H/O PIH: No  H/O STIs: No  H/O mental health concerns: No  Onset care > 20 weeks: No  Other: Large fibroid causing pain.    High Risk Category (4 or more At Risk or)  Diabetes/GDM: No  Multiple gestation: No  Chronic hypertension: No  Significant hx of asthma: No  Fetal demise > 20 weeks: No  Positive tox screen: No  Current mental health treatment: No  Other: none    Risk: At Risk   Date determined: 2019    Patient Active Problem List   Diagnosis     Cystic acne     Latent tuberculosis     Redness of right eye     Chronic right-sided low back pain without sciatica     Vitamin D deficiency     AMA (advanced maternal age) multigravida 35+     Supervision of high-risk pregnancy of elderly multigravida     Subserous leiomyoma of uterus       Sagrario Meyer speaks English so an  was not used today.    Guidance:  circumcision  breastfeeding  GDM  signs of  labor    Objective  BP 96/65   Pulse 82   Temp 98.2  F (36.8  C)   Resp 16   Ht 1.632 m (5' 4.25\")   Wt 64.1 kg (141 lb 6.4 oz)   LMP 2019 (Approximate)   BMI 24.08 kg/m    No distress.  Gravid abdomen.  .  Fundal height 25cm cm.  no " edema.    Results  Blood type: O POS  Results for orders placed or performed in visit on 19   Wet Prep ([facility])   Result Value Ref Range    Yeast Wet Prep Present none    Motile Trichomonas Wet Prep Negative Negative    Clue Cells Wet Prep None NONE    WBC WET PREP 5-10 2 - 5    Bacteria Wet Prep Moderate None    pH Wet Prep Not performed 3.8 - 4.5    Odor Wet Prep None NONE   Urinalysis, Micro If ([facility])   Result Value Ref Range    Specific Gravity Urine 1.010 1.005 - 1.030    pH Urine 7.0 4.5 - 8.0    Leukocyte Esterase UR 1+ (A) NEGATIVE    Nitrite Urine Negative NEGATIVE    Protein UR Negative NEGATIVE    Glucose Urine Trace (A) NEGATIVE    Ketones Urine Negative NEGATIVE    Urobilinogen mg/dL 0.2 E.U./dL 0.2 E.U./dL    Bilirubin UR Negative NEGATIVE    Blood UR Negative NEGATIVE   Urine Culture ([facility])   Result Value Ref Range    Culture SEE RESULTS BELOW     Narrative    Test performed by:  [facility] [address] [place] [address]   Urine Microscopic ([facility])   Result Value Ref Range    WBC Urine 2-5 <5 /hpf    RBC Urine None <5 /hpf    Epithelial Cells UR 10-25 0 - 2 /lpf    Mucous Urine None NONE lpf    Casts Urine None NONE /lpf    Crystal Urine None NONE /lpf    Bacteria Wet Prep Moderate None    Narrative    Yeast present. DL        Assessment & Plan  38 year old  at 24w0d with RIO Dec 30, 2019 based on LMP and  6 week US    Sagrario was seen today for prenatal care.    Diagnoses and all orders for this visit:    Supervision of high-risk pregnancy of elderly multigravida: Checking 24 week labs including GDM screening.  -     Glucose Challenge  1 Hr  ([facility])  -     Syphilis Screen [place] ([facility])  -     Hemoglobin (HGB) ([facility])    Gastroesophageal reflux disease without esophagitis: discussed taking ranitidine twice a day regularly to prevent sxs.  Also add oon maalox for prn use after meals.  -     alum & mag hydroxide-simethicone (MAALOX ADVANCED MAX ST) 400-400-40  MG/5ML SUSP suspension; Take 30 mLs by mouth 3 times daily as needed for heartburn    Vomiting or nausea of pregnancy: may be related to reflux.    -     ondansetron (ZOFRAN-ODT) 4 MG ODT tab; Take 1 tablet (4 mg) by mouth every 8 hours as needed for nausea    Weight gain adequate: 5.171 kg (11 lb 6.4 oz) to date, out of recommended total of 25-35 lbs (pregravid BMI 18.5-24.9)    Patient Instructions   Take ranitidine twice a day every day.  Take maalox liquid as needed for break through medicine.      Return to clinic in 4 weeks.    Dee Webb MD

## 2019-09-09 NOTE — LETTER
September 9, 2019      Re:Sagrario Meyer  [address]   [place] [address]        To [facility],    I am writing to recommend that Sagrario Zuñiga be moved to a one bedroom unit in her current housing complex.  Because of her pregnancy and sciatica, she is physically unable to move to another building.  She is unable to lift all of her belongings.  Moving to to a one-bedroom in her same complex will still be difficult, but will be possible with the help of others, neighbors, in her building.    Sincerely,    Dee Webb MD

## 2019-09-10 ENCOUNTER — TELEPHONE (OUTPATIENT)
Dept: FAMILY MEDICINE | Facility: CLINIC | Age: 39
End: 2019-09-10

## 2019-09-10 LAB — TREPONEMA ANTIBODY (SYPHILIS): NEGATIVE

## 2019-09-10 NOTE — TELEPHONE ENCOUNTER
"Pt states that this afternoon she has been having a \"lot\" of pain in her left side.  She states that she had similar pain last week but today it is much worse.  Pt unable to describe in detail and is not sure if comes and goes or is constant as it is just very painful.  Pt pausing frequently in conversation and is moaning.  Pt states that she is also having nausea and vomiting but denies diarrhea, constipation, contractions, UTI symptoms, and vag infection symptoms.  Told pt that she needs to go to ER for evaluation.  She states that she is in [place] and will have her friend take her to the nearest hospital.  Told her to be sure to tell them that she is pregnant.  Pt verbalized understanding.  Routed note to Dr Webb./JAVY  "

## 2019-09-12 NOTE — TELEPHONE ENCOUNTER
Pt states that she went to [facility] and was told everything was okay.  They gave her Tylenol and heating pack which helped and is no longer having the pain. Pt states that she is fine and denies any questions.  TOld her to call with further questions or concerns./NG

## 2019-09-16 ENCOUNTER — TELEPHONE (OUTPATIENT)
Dept: FAMILY MEDICINE | Facility: CLINIC | Age: 39
End: 2019-09-16

## 2019-09-16 ENCOUNTER — RECORDS - HEALTHEAST (OUTPATIENT)
Dept: ADMINISTRATIVE | Facility: OTHER | Age: 39
End: 2019-09-16

## 2019-09-16 ENCOUNTER — RECORDS - HEALTHEAST (OUTPATIENT)
Dept: ULTRASOUND IMAGING | Facility: HOSPITAL | Age: 39
End: 2019-09-16

## 2019-09-16 ENCOUNTER — OFFICE VISIT - HEALTHEAST (OUTPATIENT)
Dept: MATERNAL FETAL MEDICINE | Facility: HOSPITAL | Age: 39
End: 2019-09-16

## 2019-09-16 DIAGNOSIS — D25.9 LEIOMYOMA OF UTERUS, UNSPECIFIED: ICD-10-CM

## 2019-09-16 DIAGNOSIS — O34.10 UTERINE FIBROID IN PREGNANCY: ICD-10-CM

## 2019-09-16 DIAGNOSIS — O34.10 MATERNAL CARE FOR BENIGN TUMOR OF CORPUS UTERI, UNSPECIFIED TRIMESTER: ICD-10-CM

## 2019-09-16 DIAGNOSIS — O09.512 AMA (ADVANCED MATERNAL AGE) PRIMIGRAVIDA 35+, SECOND TRIMESTER: ICD-10-CM

## 2019-09-16 DIAGNOSIS — D25.9 UTERINE FIBROID IN PREGNANCY: ICD-10-CM

## 2019-09-16 DIAGNOSIS — O09.512 SUPERVISION OF ELDERLY PRIMIGRAVIDA, SECOND TRIMESTER: ICD-10-CM

## 2019-09-16 NOTE — TELEPHONE ENCOUNTER
Pt is calling to give Dr Webb the information for her 's sister, Manny Hathaway 9/11/1970 for the letter of support for her sister in law to come from [place] to help her during the pregnancy since her  has not been allowed to come.  Pt would like a call when the letter is ready to be picked up.  Routed note to Dr Webb./JAVY

## 2019-09-16 NOTE — LETTER
2019      Sagrario Meyer  [address]   [place] [address]        To Whom It May Concern,    I am the physician for Sagrario Meyer.  She is currently pregnant and has no family in the [place].  Her sister-in-law, Manny Hathaway  1970, is currently in [facility].  She is having pregnancy complications that require assistance of family to care for her. It is medically recommended that she have support during pregnancy and after the birth of her baby.  It is urgent to reunite this family in the [place] as soon as possible.    Sincerely,    Dee Webb MD

## 2019-09-16 NOTE — TELEPHONE ENCOUNTER
[facility] phone call message- general phone call:    Reason for call: she would like to talk to jessy.    Action desired: call back.    Return call needed: Yes    OK to leave a message on voice mail? Yes    Advised patient to response may take up to 2 business days: Yes    Primary language: English      needed? No    Call taken on September 16, 2019 at 2:33 PM by Graham Dupree

## 2019-09-18 NOTE — TELEPHONE ENCOUNTER
Tried calling pt back to let her know that the letter is ready but her phone number is not in service.    Called and spoke with ER contact Linh Jackman [phone] and she states that Sagrario recently changed her phone number but she is not sure what is.  She will tell pt to call the clinic when she sees her./JAVY

## 2019-09-27 ENCOUNTER — TELEPHONE (OUTPATIENT)
Dept: FAMILY MEDICINE | Facility: CLINIC | Age: 39
End: 2019-09-27

## 2019-09-27 NOTE — TELEPHONE ENCOUNTER
[facility] phone call message- general phone call:    Reason for call: she would like to talk to jessy.    Action desired: call back.    Return call needed: Yes    OK to leave a message on voice mail? Yes    Advised patient to response may take up to 2 business days: Yes      Primary language: English      needed? No    Call taken on September 27, 2019 at 1:38 PM by Graham Dupree

## 2019-09-27 NOTE — TELEPHONE ENCOUNTER
Pt states that she is at [facility] and just had an MRI.  The doctor told her that she may have appendicitis and may need to have surgery.  She is waiting for the results and will call back when she finds out the plan.  Pt is wondering if she needs surgery if she should go to [facility]?  Told her to discuss that with the doctor there but if it is an emergency than she should stay there and have surgery there.  Pt verbalized understanding./NG

## 2019-09-27 NOTE — TELEPHONE ENCOUNTER
[facility] phone call message-patient reporting a symptom:     Symptom: pt went to the ER last night for some stomach pain. Please call asap.    Same Day Visit Offered:no     Additional comments: none    OK to leave message on voice mail? Yes    Primary language: English      needed? No    Call taken on September 27, 2019 at 10:43 AM by Cristopher Saba

## 2019-09-30 ENCOUNTER — OFFICE VISIT (OUTPATIENT)
Dept: FAMILY MEDICINE | Facility: CLINIC | Age: 39
End: 2019-09-30
Payer: COMMERCIAL

## 2019-09-30 VITALS
BODY MASS INDEX: 24.86 KG/M2 | DIASTOLIC BLOOD PRESSURE: 62 MMHG | HEIGHT: 64 IN | RESPIRATION RATE: 22 BRPM | HEART RATE: 91 BPM | OXYGEN SATURATION: 99 % | WEIGHT: 145.6 LBS | SYSTOLIC BLOOD PRESSURE: 97 MMHG | TEMPERATURE: 98.3 F

## 2019-09-30 DIAGNOSIS — O09.522 MULTIGRAVIDA OF ADVANCED MATERNAL AGE IN SECOND TRIMESTER: ICD-10-CM

## 2019-09-30 DIAGNOSIS — O09.529 SUPERVISION OF HIGH-RISK PREGNANCY OF ELDERLY MULTIGRAVIDA: Primary | ICD-10-CM

## 2019-09-30 DIAGNOSIS — R10.9 ABDOMINAL PAIN AFFECTING PREGNANCY: ICD-10-CM

## 2019-09-30 DIAGNOSIS — O26.899 ABDOMINAL PAIN AFFECTING PREGNANCY: ICD-10-CM

## 2019-09-30 ASSESSMENT — MIFFLIN-ST. JEOR: SCORE: 1323.19

## 2019-09-30 ASSESSMENT — ANXIETY QUESTIONNAIRES
5. BEING SO RESTLESS THAT IT IS HARD TO SIT STILL: NOT AT ALL
1. FEELING NERVOUS, ANXIOUS, OR ON EDGE: NOT AT ALL
7. FEELING AFRAID AS IF SOMETHING AWFUL MIGHT HAPPEN: NOT AT ALL
3. WORRYING TOO MUCH ABOUT DIFFERENT THINGS: NOT AT ALL
6. BECOMING EASILY ANNOYED OR IRRITABLE: NOT AT ALL
IF YOU CHECKED OFF ANY PROBLEMS ON THIS QUESTIONNAIRE, HOW DIFFICULT HAVE THESE PROBLEMS MADE IT FOR YOU TO DO YOUR WORK, TAKE CARE OF THINGS AT HOME, OR GET ALONG WITH OTHER PEOPLE: NOT DIFFICULT AT ALL
GAD7 TOTAL SCORE: 0
2. NOT BEING ABLE TO STOP OR CONTROL WORRYING: NOT AT ALL

## 2019-09-30 ASSESSMENT — PATIENT HEALTH QUESTIONNAIRE - PHQ9
5. POOR APPETITE OR OVEREATING: NOT AT ALL
SUM OF ALL RESPONSES TO PHQ QUESTIONS 1-9: 9

## 2019-09-30 ASSESSMENT — PAIN SCALES - GENERAL: PAINLEVEL: SEVERE PAIN (6)

## 2019-09-30 NOTE — PATIENT INSTRUCTIONS
Thank you for coming to [facility] for your care. It was a pleasure to take care of you!    - Great job taking care of yours and your baby's health  - Continue taking your prenatals  - Remember to only use Tylenol for your pain. Avoid Ibuprofen.  - Return to clinic one week for a follow up.   - Call the clinic or call the [place] line below if you have you have any of the danger signs that we discussed including:-    - Increased abdominal pain   - Loss of fluid   - Vaginal bleeding   - Decreased fetal movement (less than 10 movements within 2 hours)        - Contractions (occuring every 5 minutes and lasting between 60 - 90 seconds)    [facility]   Phone: [phone]  Address: [facility]  2nd floor [address] [address]    Patricia Townsend MD

## 2019-09-30 NOTE — PROGRESS NOTES
"Subjective  Concerns:   - Abdominal pain , has been ongoing since onset of pregnancy, Most recently started on Thursday and was sen at the ED for this. Had a repeat ultrasound at that time. Her fibroid was noted to be 9.5 cm and stable. Patient repors that her pain has been progressively improving. Has not had this pain today.   - During episodes of sever pain, she has had vomiting but denies any of this recently.   - Would like a work note today to be able to rest from going to work for the next week.     ROS:  No - Headache  No - Changes in vision  No - Chest Pain  No - Shortness of Breath  No - Nausea   YES - Vomiting, has spaced out in terms of how often it happens, was more severe about 5 days ago.   YES - Abdominal pain, Right lower quadrant region.   No - Contractions  No - Dysuria   No - Vaginal Discharge    No - Vaginal bleeding   No - Loss of Fluid   No - Extremity swelling   Present - Fetal movement    Going to WIC? Yes    Risk Assessment     At Risk Category (up to 3)  Due to AMA and uterine fibroid.     Risk: At Risk   Date determined: 9/30/2019    Patient Active Problem List   Diagnosis     Cystic acne     Latent tuberculosis     Redness of right eye     Chronic right-sided low back pain without sciatica     Vitamin D deficiency     AMA (advanced maternal age) multigravida 35+     Supervision of high-risk pregnancy of elderly multigravida     Subserous leiomyoma of uterus     Sagrario Meyer speaks English so an  was not used today.    Guidance:    Circumcision - Yes  Breastfeeding - Yes  GDM - No    Objective  BP 97/62   Pulse 91   Temp 98.3  F (36.8  C) (Oral)   Resp 22   Ht 1.63 m (5' 4.17\")   Wt 66 kg (145 lb 9.6 oz)   LMP 03/25/2019 (Approximate)   SpO2 99%   Breastfeeding? No   BMI 24.86 kg/m    No distress.  Gravid abdomen.   - 165.  Fundal height 27 cm.  no edema.  Abdomen: Notable for a palpable mass in the left upper quadrant region presumed to be her fibroid. Mild " tenderness to palpation along the right lower quadrant region.  No rebound tenderness or guarding.     Results  Blood type: O POS  Results for orders placed or performed in visit on 19   Glucose Challenge  1 Hr  ([facility])   Result Value Ref Range    Glu Gest Screen 1hr 50g 60 0 - 129   Syphilis Screen [place] ([facility])   Result Value Ref Range    Treponema Antibody (Syphilis) Negative Negative    Narrative    Test performed by:  [facility] [address] [place] [address]   Hemoglobin (HGB) ([facility])   Result Value Ref Range    Hemoglobin 11.0 (L) 11.7 - 15.7 g/dL     Assessment & Plan  Sagrario was seen today for prenatal care.    Diagnoses and all orders for this visit:    38 year old  at 27w0d with RIO Dec 30, 2019 based on 8 week ultrasound. Pregnancy complicated by AMA status and presence of a large uterine fibroid and associated abdominal pain.     Supervision of at-risk pregnancy of elderly multigravida  Uterine fibroid, subserosal  Noted to have a large fibroid, most recent 9.5 cm and has been following up with us as well as MFM. Has not had any abnormal bleeding.  - Continue follow up for this, RTC in one week.    Right abdominal pain  Unclear etiology of right LQ pain, could be related to a muscle strain/pull vs 2/2 round ligament pain in the setting of an enlarging uterus. Has also been more active recently since she moved to a new place and so could be related to that. Recommended using ice and heat in the affected region as well as Tylenol as needed. No red flags otherwise at this time. VItals initially on the softer end but improved at repeat check. No fever or chills otherwise. Will follow up at next visit.   - Return precautions discussed.   - Work note to be off for at least one week provided.     Weight gain adequate: 7.076 kg (15 lb 9.6 oz) to date, out of recommended total of 25-35 lbs (pregravid BMI 18.5-24.9)    Patient Instructions   Thank you for coming to [facility] for your care. It was a pleasure to take care of you!    - Great job taking care of yours and your baby's health  - Continue taking your prenatals  - Remember to only use Tylenol for your pain. Avoid Ibuprofen.  - Return to clinic one week for a follow up.   - Call the clinic or call the [facility] line below if you have you have any of the danger signs that we discussed including:-    - Increased abdominal pain   - Loss of fluid   - Vaginal bleeding   - Decreased fetal movement (less than 10 movements within 2 hours)        - Contractions (occuring every 5 minutes and lasting between 60 - 90 seconds)    [facility]   Phone: [phone]  Address: [facility]  2nd floor [address] [address]    Patricia Townsend MD          Return to clinic in 1 week to see Dr. Tracey Townsend MD  I precepted today with Dee Webb MD.

## 2019-09-30 NOTE — TELEPHONE ENCOUNTER
Called pt to see how she is doing and she states that the ER doctor told her that the pain was likely muscle pain and was not appendicitis.  She states that she was sent home because her pain had improved.  She states that she is still having pain and needs a new note for work.  Appt made with Dr Townsend for today at 4:10 PM.  Told her to call back if she has any questions or concerns.    Gave info to Dr Webb and Dr Townsend in clinic./NG

## 2019-09-30 NOTE — NURSING NOTE
"Chief Complaint   Patient presents with     Prenatal Care     Obstetrics Visit/ Patient states feeling tired and with lower abdomen pain ( \"Baby is pushing down\", she says). No vaginal discharge or bleeding     Silvestre Irvin CMA      "

## 2019-09-30 NOTE — LETTER
September 30, 2019      Sagrario Meyer  [address]   [place] [address]        To Whom It May Concern:    Sagrario Meyer was seen in our clinic. She may return to work without restrictions on October 11 2019.      Sincerely,        Patricia Townsend MD

## 2019-10-01 ASSESSMENT — ANXIETY QUESTIONNAIRES: GAD7 TOTAL SCORE: 0

## 2019-10-10 ENCOUNTER — OFFICE VISIT (OUTPATIENT)
Dept: FAMILY MEDICINE | Facility: CLINIC | Age: 39
End: 2019-10-10
Payer: COMMERCIAL

## 2019-10-10 VITALS
BODY MASS INDEX: 24.43 KG/M2 | RESPIRATION RATE: 14 BRPM | HEIGHT: 65 IN | SYSTOLIC BLOOD PRESSURE: 110 MMHG | HEART RATE: 93 BPM | TEMPERATURE: 98.1 F | DIASTOLIC BLOOD PRESSURE: 70 MMHG | WEIGHT: 146.6 LBS

## 2019-10-10 DIAGNOSIS — Z23 NEED FOR PROPHYLACTIC VACCINATION AND INOCULATION AGAINST INFLUENZA: ICD-10-CM

## 2019-10-10 DIAGNOSIS — O09.529 SUPERVISION OF HIGH-RISK PREGNANCY OF ELDERLY MULTIGRAVIDA: Primary | ICD-10-CM

## 2019-10-10 DIAGNOSIS — O09.523 MULTIGRAVIDA OF ADVANCED MATERNAL AGE IN THIRD TRIMESTER: ICD-10-CM

## 2019-10-10 DIAGNOSIS — D25.2 SUBSEROUS LEIOMYOMA OF UTERUS: ICD-10-CM

## 2019-10-10 ASSESSMENT — MIFFLIN-ST. JEOR: SCORE: 1340.85

## 2019-10-10 ASSESSMENT — PATIENT HEALTH QUESTIONNAIRE - PHQ9: SUM OF ALL RESPONSES TO PHQ QUESTIONS 1-9: 2

## 2019-10-10 NOTE — PROGRESS NOTES
"Subjective  Concerns: Still has pelvic pain off and on.  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    ROS:  No - Headache  No - Changes in vision  No - Chest Pain  No - Shortness of Breath  No - Nausea   No - Vomiting  YES - Abdominal pain   No - Contractions  No - Dysuria   No - Vaginal Discharge    No - Vaginal bleeding   No - Loss of Fluid   No - Extremity swelling   Present - Fetal movement       Going to WIC? Yes      Patient Active Problem List   Diagnosis     Cystic acne     Latent tuberculosis     Redness of right eye     Chronic right-sided low back pain without sciatica     Vitamin D deficiency     AMA (advanced maternal age) multigravida 35+     Supervision of high-risk pregnancy of elderly multigravida     Subserous leiomyoma of uterus       Sagrario Meyer speaks English so an  was not used today.    Guidance:    seatbelt use  fetal growth and movement  signs of  labor    Do you need help getting a car seat? YES  Do you need help getting a breast pump? YES      Objective  /70   Pulse 93   Temp 98.1  F (36.7  C)   Resp 14   Ht 1.651 m (5' 5\")   Wt 66.5 kg (146 lb 9.6 oz)   LMP 2019 (Approximate)   BMI 24.40 kg/m    No distress.  Gravid abdomen.  See flowsheet    Results  Blood type: O POS  Results for orders placed or performed in visit on 19   Glucose Challenge  1 Hr  ([facility])   Result Value Ref Range    Glu Gest Screen 1hr 50g 60 0 - 129   Syphilis Screen [place] ([facility])   Result Value Ref Range    Treponema Antibody (Syphilis) Negative Negative    Narrative    Test performed by:  [facility]  [address] [place] [address]   Hemoglobin (HGB) ([facility])   Result Value Ref Range    Hemoglobin 11.0 (L) 11.7 - 15.7 g/dL       Assessment & Plan  38 year old  at 28w3d with RIO Dec 30, 2019 based on early US.    Sagrario was seen today for " prenatal care and imm/inj.    Diagnoses and all orders for this visit:    Supervision of high-risk pregnancy of elderly multigravida: Doing well today gave Tdap and flu shot.  Also gave prescription for breast pump which we will help her obtain.  Also will try to help her get a car seat.  Nausea and vomiting is a little better.  Belly pain is also a little better but exacerbated with heavy lifting at work gave her a note that is very specific to not transfer patients or persimmon wheelchairs as these exacerbate her pain significantly.  The  -     order for DME; Equipment being ordered: Double electric Breast Pump Medela brand.  -     TDAP VACCINE (BOOSTRIX)    Multigravida of advanced maternal age in third trimester    Subserous leiomyoma of uterus: stable.    Need for prophylactic vaccination and inoculation against influenza  -     Fluzone quad, multidose 0.5ml, 6+ months [23025]      Weight gain adequate: 7.53 kg (16 lb 9.6 oz) to date, out of recommended total of 25-35 lbs (pregravid BMI 18.5-24.9)    There are no Patient Instructions on file for this visit.    Return to clinic in 2 weeks.    Dee Webb MD

## 2019-10-11 NOTE — PROGRESS NOTES
10/11/19 Requested car seat for pt online through [employer].  They will contact pt once they are ready to deliver to arrange time for delivery and instruct pt on how to use car seat (usually takes 3 weeks from the time referral received to get call for delivery).   Faxed prescription for breast pump to [facility] [employer].  Breast pump will be delivered to pt's home./NG

## 2019-10-21 ENCOUNTER — RECORDS - HEALTHEAST (OUTPATIENT)
Dept: ULTRASOUND IMAGING | Facility: HOSPITAL | Age: 39
End: 2019-10-21

## 2019-10-21 ENCOUNTER — OFFICE VISIT - HEALTHEAST (OUTPATIENT)
Dept: MATERNAL FETAL MEDICINE | Facility: HOSPITAL | Age: 39
End: 2019-10-21

## 2019-10-21 ENCOUNTER — RECORDS - HEALTHEAST (OUTPATIENT)
Dept: ADMINISTRATIVE | Facility: OTHER | Age: 39
End: 2019-10-21

## 2019-10-21 DIAGNOSIS — D25.9 LEIOMYOMA OF UTERUS, UNSPECIFIED: ICD-10-CM

## 2019-10-21 DIAGNOSIS — O09.523 ADVANCED MATERNAL AGE IN MULTIGRAVIDA, THIRD TRIMESTER: ICD-10-CM

## 2019-10-21 DIAGNOSIS — O09.512 SUPERVISION OF ELDERLY PRIMIGRAVIDA, SECOND TRIMESTER: ICD-10-CM

## 2019-10-21 DIAGNOSIS — O34.10 UTERINE FIBROID DURING PREGNANCY, ANTEPARTUM: ICD-10-CM

## 2019-10-21 DIAGNOSIS — O34.10 MATERNAL CARE FOR BENIGN TUMOR OF CORPUS UTERI, UNSPECIFIED TRIMESTER: ICD-10-CM

## 2019-10-21 DIAGNOSIS — D25.9 UTERINE FIBROID DURING PREGNANCY, ANTEPARTUM: ICD-10-CM

## 2019-10-28 ENCOUNTER — OFFICE VISIT (OUTPATIENT)
Dept: FAMILY MEDICINE | Facility: CLINIC | Age: 39
End: 2019-10-28
Payer: COMMERCIAL

## 2019-10-28 VITALS
HEART RATE: 85 BPM | BODY MASS INDEX: 25.23 KG/M2 | SYSTOLIC BLOOD PRESSURE: 104 MMHG | WEIGHT: 151.6 LBS | TEMPERATURE: 98.2 F | DIASTOLIC BLOOD PRESSURE: 68 MMHG

## 2019-10-28 DIAGNOSIS — O09.529 SUPERVISION OF HIGH-RISK PREGNANCY OF ELDERLY MULTIGRAVIDA: Primary | ICD-10-CM

## 2019-10-28 DIAGNOSIS — R04.0 BLEEDING FROM THE NOSE: ICD-10-CM

## 2019-10-28 LAB
HCT VFR BLD AUTO: 35.7 % (ref 35–47)
HEMOGLOBIN: 11.1 G/DL (ref 11.7–15.7)
MCH RBC QN AUTO: 31 PG (ref 26.5–35)
MCHC RBC AUTO-ENTMCNC: 31.1 G/DL (ref 32–36)
MCV RBC AUTO: 99.6 FL (ref 78–100)
PLATELET # BLD AUTO: 179 K/UL (ref 150–450)
RBC # BLD AUTO: 3.6 M/UL (ref 3.8–5.2)
WBC # BLD AUTO: 10.4 K/UL (ref 4–11)

## 2019-10-28 ASSESSMENT — ANXIETY QUESTIONNAIRES
3. WORRYING TOO MUCH ABOUT DIFFERENT THINGS: NOT AT ALL
5. BEING SO RESTLESS THAT IT IS HARD TO SIT STILL: SEVERAL DAYS
1. FEELING NERVOUS, ANXIOUS, OR ON EDGE: NOT AT ALL
6. BECOMING EASILY ANNOYED OR IRRITABLE: NOT AT ALL
7. FEELING AFRAID AS IF SOMETHING AWFUL MIGHT HAPPEN: NOT AT ALL
2. NOT BEING ABLE TO STOP OR CONTROL WORRYING: NOT AT ALL
GAD7 TOTAL SCORE: 1

## 2019-10-28 ASSESSMENT — PATIENT HEALTH QUESTIONNAIRE - PHQ9
5. POOR APPETITE OR OVEREATING: NOT AT ALL
SUM OF ALL RESPONSES TO PHQ QUESTIONS 1-9: 1

## 2019-10-28 NOTE — LETTER
2019      RE: Sagrario Meyer  [address]   [place] [address]        To Whom It May Concern,    I am the physician for Sagrario Meyer.  She is currently pregnant and has no family in the [place].  Her close relative, Aries Jimenez,  7/15/1981, is currently in [facility]\"".  My patient is having pregnancy complications that require assistance of family to care for her. It is medically recommended that she have support during pregnancy and after the birth of her baby.  It is urgent to reunite this family in the [place] as soon as possible.    Sincerely,    Dee Webb MD

## 2019-10-28 NOTE — LETTER
October 29, 2019      Sagrario Meyer  [address]   [place] [address]        Dear Sagrario,    Please see below for your test results.    Resulted Orders   CBC with Plt ([facility])   Result Value Ref Range    WBC 10.4 4.0 - 11.0 K/uL    RBC 3.6 (L) 3.8 - 5.2 M/uL    Hemoglobin 11.1 (L) 11.7 - 15.7 g/dL    Hematocrit 35.7 35.0 - 47.0 %    MCV 99.6 78.0 - 100.0 fL    MCH 31.0 26.5 - 35.0    MCHC 31.1 (L) 32.0 - 36.0 g/dL    Platelets 179.0 150.0 - 450.0 K/uL     Your labs are normal and reassuring.    If you have any questions, please call the clinic to make an appointment.    Sincerely,    Dee Webb MD

## 2019-10-28 NOTE — PROGRESS NOTES
Subjective  Concerns: bleeding nose and gums recently. MFM asked her to get CBC, complete.    ROS:  No - Headache, occaisional  No - Changes in vision  No - Chest Pain  No - Shortness of Breath  No - Nausea   No - Vomiting  No - Abdominal pain   No - Contractions, some BH ctx.  No - Dysuria   No - Vaginal Discharge    No - Vaginal bleeding   No - Loss of Fluid   No - Extremity swelling   Present - Fetal movement       Going to WIC? Yes      Patient Active Problem List   Diagnosis     Cystic acne     Latent tuberculosis     Redness of right eye     Chronic right-sided low back pain without sciatica     Vitamin D deficiency     AMA (advanced maternal age) multigravida 35+     Supervision of high-risk pregnancy of elderly multigravida     Subserous leiomyoma of uterus       Sagrario Meyer speaks English so an  was not used today.    Guidance:  seatbelt use  fetal growth and movement  signs of  labor    Do you need help getting a car seat? No  Do you need help getting a breast pump? No      Objective  /68   Pulse 85   Temp 98.2  F (36.8  C)   Wt 68.8 kg (151 lb 9.6 oz)   LMP 2019 (Approximate)   BMI 25.23 kg/m    No distress.  Gravid abdomen.  .  Fundal height 32.5 cm.  no edema.    Results  Blood type: O POS  Results for orders placed or performed in visit on 19   Glucose Challenge  1 Hr  ([facility])   Result Value Ref Range    Glu Gest Screen 1hr 50g 60 0 - 129   Syphilis Screen [place] ([facility])   Result Value Ref Range    Treponema Antibody (Syphilis) Negative Negative    Narrative    Test performed by:  [facility]  [address] [place] [address]   Hemoglobin (HGB) ([facility])   Result Value Ref Range    Hemoglobin 11.0 (L) 11.7 - 15.7 g/dL       Assessment & Plan  38 year old  at 31w0d with RIO Dec 30, 2019 based on 8 week US    Sagrario was seen today for prenatal care and blood draw.    Diagnoses and all orders for this visit:    Supervision of  high-risk pregnancy of elderly multigravida: doing well over all.  Continue follow up with MFM.  Requested new letter for cousin to come help her.  This cousin has been to the [place]  A couple times before. They are hoping this will work.  -     CBC with Plt ([facility])    Bleeding from the nose: will check full CBC due to bleeding nose and gums recently.  Make sure platelets and hemoglobin is stable.   -     CBC with Plt ([facility])        Weight gain adequate: 9.798 kg (21 lb 9.6 oz) to date, out of recommended total of 25-35 lbs (pregravid BMI 18.5-24.9)    There are no Patient Instructions on file for this visit.    Return to clinic in 2 weeks.    Dee Webb MD

## 2019-10-29 ASSESSMENT — ANXIETY QUESTIONNAIRES: GAD7 TOTAL SCORE: 1

## 2019-11-11 ENCOUNTER — OFFICE VISIT (OUTPATIENT)
Dept: FAMILY MEDICINE | Facility: CLINIC | Age: 39
End: 2019-11-11
Payer: COMMERCIAL

## 2019-11-11 VITALS
RESPIRATION RATE: 16 BRPM | HEIGHT: 64 IN | HEART RATE: 87 BPM | TEMPERATURE: 98.2 F | WEIGHT: 154.2 LBS | DIASTOLIC BLOOD PRESSURE: 72 MMHG | SYSTOLIC BLOOD PRESSURE: 111 MMHG | BODY MASS INDEX: 26.32 KG/M2

## 2019-11-11 DIAGNOSIS — O09.529 AMA (ADVANCED MATERNAL AGE) MULTIGRAVIDA 35+: ICD-10-CM

## 2019-11-11 DIAGNOSIS — D25.2 SUBSEROUS LEIOMYOMA OF UTERUS: ICD-10-CM

## 2019-11-11 DIAGNOSIS — Z65.3 PROBLEMS RELATED TO OTHER LEGAL CIRCUMSTANCES: ICD-10-CM

## 2019-11-11 DIAGNOSIS — O09.529 SUPERVISION OF HIGH-RISK PREGNANCY OF ELDERLY MULTIGRAVIDA: Primary | ICD-10-CM

## 2019-11-11 ASSESSMENT — MIFFLIN-ST. JEOR: SCORE: 1354.45

## 2019-11-11 ASSESSMENT — PATIENT HEALTH QUESTIONNAIRE - PHQ9: SUM OF ALL RESPONSES TO PHQ QUESTIONS 1-9: 2

## 2019-11-11 NOTE — PROGRESS NOTES
"Subjective  Concerns: left sinus pain, runny nose and headache. Worse when cold at night.    Neighbor is smoking marijuana over the weekends which make it impossible to stay at her home.  She had to leave her home and stay with a friend last weekend as the smell was coming into her apartment and made her feel sick.  She states she has told her landlord about this multiple times and it keeps happening.  She would likely relate help with this.    ROS:  YES - Headache as above  No - Changes in vision  No - Chest Pain  No - Shortness of Breath  No - Nausea   No - Vomiting  No - Abdominal pain   No - Contractions  No - Dysuria   No - Vaginal Discharge    No - Vaginal bleeding   No - Loss of Fluid   No - Extremity swelling   Present - Fetal movement       Going to WIC? Yes      Patient Active Problem List   Diagnosis     Cystic acne     Latent tuberculosis     Redness of right eye     Chronic right-sided low back pain without sciatica     Vitamin D deficiency     AMA (advanced maternal age) multigravida 35+     Supervision of high-risk pregnancy of elderly multigravida     Subserous leiomyoma of uterus       Sagrario Meyer speaks English so an  was not used today.    Guidance:  birth control  travel  warning signs/PIH    Do you need help getting a car seat? No  Do you need help getting a breast pump? No      Objective  /72   Pulse 87   Temp 98.2  F (36.8  C)   Resp 16   Ht 1.626 m (5' 4\")   Wt 69.9 kg (154 lb 3.2 oz)   LMP 2019 (Approximate)   BMI 26.47 kg/m    No distress.  Gravid abdomen.  .  Fundal height 34.5 cm.  no edema.    Results  Blood type: O POS    Assessment & Plan  39 year old  at 33w0d with RIO Dec 30, 2019 based on early US.    Sagrario was seen today for prenatal care and sinus problem.    Diagnoses and all orders for this visit:    Supervision of high-risk pregnancy of elderly multigravida: symptomatic mgmt of cold sxs. Doing well otherwise.  Up to date on " screening labs and immunizations.    Gave me FMLA and medical leave paperwork for job.  She would like 12 weeks. Will fill out for next appointment in 2 weeks.      Problems related to other legal circumstances: neighbor smoking marijuana. Asked Fanny to kin her.   -     Legal Services Referral - [place] only        Weight gain adequate: 11 kg (24 lb 3.2 oz) to date, out of recommended total of 25-35 lbs (pregravid BMI 18.5-24.9)  Return to clinic in 2 weeks.    Dee eWbb MD

## 2019-11-18 ENCOUNTER — TELEPHONE (OUTPATIENT)
Dept: FAMILY MEDICINE | Facility: CLINIC | Age: 39
End: 2019-11-18

## 2019-11-18 NOTE — TELEPHONE ENCOUNTER
"Pt states that she received the letter from Dr Webb stating that her blood clots are normal but she states that some of the results are lower than the normal ranges.  Discussed that during the pregnancy it is normal/okay for hemoglobin to be normal when it is 11 or higher due to the increase blood volume in pregnancy.  Discussed that the goal is to keep above 10 if possible during pregnancy.    Pt also states that she has noticed some discomfort in her fingers with a \"little\" swelling.  She states that she is not able to wear her ring comfortably anymore.  Discussed that mild swelling in pregnancy is normal but if it becomes worse or if she is unable to grasp/hold onto things or pain worsens or swelling is progressing to call back/make appt to be seen.  Pt verbalized understanding and denies further questions or concerns.    Gave info to Dr Webb in clinic/NG  "

## 2019-11-25 ENCOUNTER — OFFICE VISIT (OUTPATIENT)
Dept: FAMILY MEDICINE | Facility: CLINIC | Age: 39
End: 2019-11-25
Payer: COMMERCIAL

## 2019-11-25 VITALS
WEIGHT: 155.4 LBS | HEIGHT: 64 IN | DIASTOLIC BLOOD PRESSURE: 71 MMHG | RESPIRATION RATE: 16 BRPM | SYSTOLIC BLOOD PRESSURE: 103 MMHG | TEMPERATURE: 97.9 F | BODY MASS INDEX: 26.53 KG/M2 | HEART RATE: 86 BPM

## 2019-11-25 DIAGNOSIS — D25.2 SUBSEROUS LEIOMYOMA OF UTERUS: ICD-10-CM

## 2019-11-25 DIAGNOSIS — O09.529 SUPERVISION OF HIGH-RISK PREGNANCY OF ELDERLY MULTIGRAVIDA: Primary | ICD-10-CM

## 2019-11-25 DIAGNOSIS — O09.523 MULTIGRAVIDA OF ADVANCED MATERNAL AGE IN THIRD TRIMESTER: ICD-10-CM

## 2019-11-25 ASSESSMENT — MIFFLIN-ST. JEOR: SCORE: 1363.86

## 2019-11-25 NOTE — PROGRESS NOTES
"Subjective  Concerns: fingers have a little swelling and tightness.     Has some tightness and burning in right nostril after cold sxs.    ROS:  No - Headache  No - Changes in vision  No - Chest Pain  No - Shortness of Breath  No - Nausea   No - Vomiting  YES - Abdominal pain Low round ligament  YES - Contractions Occasional BH ctx  No - Dysuria   No - Vaginal Discharge    No - Vaginal bleeding   No - Loss of Fluid   No - Extremity swelling   Present - Fetal movement       Going to WIC? Yes      Patient Active Problem List   Diagnosis     Cystic acne     Latent tuberculosis     Redness of right eye     Chronic right-sided low back pain without sciatica     Vitamin D deficiency     AMA (advanced maternal age) multigravida 35+     Supervision of high-risk pregnancy of elderly multigravida     Subserous leiomyoma of uterus       Sagrario Meyer speaks English so an  was not used today.    Guidance:  birth control  travel  warning signs/PIH    Do you need help getting a car seat? No has already  Do you need help getting a breast pump? No      Objective  /71   Pulse 86   Temp 97.9  F (36.6  C)   Resp 16   Ht 1.632 m (5' 4.25\")   Wt 70.5 kg (155 lb 6.4 oz)   LMP 2019 (Approximate)   BMI 26.47 kg/m    No distress.  Gravid abdomen.  .  Fundal height 35 cm.  trace edema.  Cephalic presentation confirmed with bedside ultrasound.    Results  Blood type: O POS  No results found for any visits on 19.    Assessment & Plan  39 year old  at 35w0d with RIO Dec 30, 2019 based on early US    Sagrario was seen today for prenatal care and finger.    Diagnoses and all orders for this visit:    Supervision of high-risk pregnancy of elderly multigravida    Multigravida of advanced maternal age in third trimester:   Normal exam and stable today.  Confirmed cephalic presentation with ultrasound.  She is now having weekly ultrasounds with [facility].  We will see her in 1 week to do her GBS and then see " her every other week till delivery.    She did ask about hospitals near [place] as this is where her best friend lives and she is worried that if she goes into labor and cannot drive herself that a friend might have to help her get to hospital.  We discussed that it was be ideal if she could go to [facility] or [facility] since that is where my group can deliver and where I can deliver.  However did discuss that the [facility] birthplace on [address] would be the place to go in [place] if she did have to go there.  Discussed the risks of delivering with the group that was not seen her prenatal care.  We did discuss that they would have access to all of her records at [facility] in our clinic since they are also a [facility] facility.      Weight gain adequate: 11.5 kg (25 lb 6.4 oz) to date, out of recommended total of 25-35 lbs (pregravid BMI 18.5-24.9)    There are no Patient Instructions on file for this visit.    Return to clinic in 1 weeks.    Dee Webb MD

## 2019-12-02 ENCOUNTER — RECORDS - HEALTHEAST (OUTPATIENT)
Dept: ULTRASOUND IMAGING | Facility: HOSPITAL | Age: 39
End: 2019-12-02

## 2019-12-02 ENCOUNTER — OFFICE VISIT - HEALTHEAST (OUTPATIENT)
Dept: MATERNAL FETAL MEDICINE | Facility: HOSPITAL | Age: 39
End: 2019-12-02

## 2019-12-02 ENCOUNTER — RECORDS - HEALTHEAST (OUTPATIENT)
Dept: ADMINISTRATIVE | Facility: OTHER | Age: 39
End: 2019-12-02

## 2019-12-02 DIAGNOSIS — O34.10 MATERNAL CARE FOR BENIGN TUMOR OF CORPUS UTERI, UNSPECIFIED TRIMESTER: ICD-10-CM

## 2019-12-02 DIAGNOSIS — O09.523 ADVANCED MATERNAL AGE IN MULTIGRAVIDA, THIRD TRIMESTER: ICD-10-CM

## 2019-12-02 DIAGNOSIS — O09.523 SUPERVISION OF ELDERLY MULTIGRAVIDA, THIRD TRIMESTER: ICD-10-CM

## 2019-12-02 DIAGNOSIS — D25.9 LEIOMYOMA OF UTERUS, UNSPECIFIED: ICD-10-CM

## 2019-12-02 DIAGNOSIS — O34.10 UTERINE FIBROID DURING PREGNANCY, ANTEPARTUM: ICD-10-CM

## 2019-12-02 DIAGNOSIS — D25.9 UTERINE FIBROID DURING PREGNANCY, ANTEPARTUM: ICD-10-CM

## 2019-12-09 NOTE — PROGRESS NOTES
Subjective  Concerns: none  She is conflicted about who to see for prenatal care because she would like to continue to see me however she really wants to deliver at [facility] as it is easier to get to from her house and also her friend Linh who is her main support system can get their it more easily as well because she lives near the hospital.  She did have a new prenatal visit with women's clinic that delivers at [place].  She told on that she wanted to continue to see me for prenatal care but plans to deliver at [place].    The other issue is that she does need weekly BPP's and NSTs per [facility] recommendations due to being AMA.  She currently is having these in [place] but this is harder for her to get to then coming here.  We do not have BPP's except on Mondays and over the holiday week our ultrasound person is gone    ROS:  No - Headache  No - Changes in vision  No - Chest Pain  No - Shortness of Breath  No - Nausea   No - Vomiting  No - Abdominal pain   YES - Contractions Some BH ctx  No - Dysuria   No - Vaginal Discharge    No - Vaginal bleeding   No - Loss of Fluid   No - Extremity swelling   Present - Fetal movement       Going to WIC? Yes      Patient Active Problem List   Diagnosis     Cystic acne     Latent tuberculosis     Redness of right eye     Chronic right-sided low back pain without sciatica     Vitamin D deficiency     AMA (advanced maternal age) multigravida 35+     Subserous leiomyoma of uterus     Supervision of high risk pregnancy in third trimester     Multigravida of advanced maternal age in third trimester       Sagrario Meyer speaks English so an  was not used today.    Guidance:  post-partum care  GBS  signs of labor  pain control during labor    Do you need help getting a car seat? No  Do you need help getting a breast pump? No      Objective  /70   Pulse 58   Temp 98.3  F (36.8  C)   Resp 16   Wt 73.1 kg (161 lb 3.2 oz)   LMP 03/25/2019 (Approximate)    BMI 27.67 kg/m    No distress.  Gravid abdomen.  .  Fundal height 37.5 cm.  no edema.  Cervix: posterior, fingertip dilated, long and soft    Results  Blood type: O POS    Assessment & Plan  39 year old  at 37w0d with RIO Dec 30, 2019 based on early UA.    Sagrario was seen today for prenatal care.    Diagnoses and all orders for this visit:    Supervision of high-risk pregnancy of elderly multigravida  -     Clt. Grp B Strp Screen ([employer] [facility]  Discussed options for pain relief during labor in detail.    Discussed at length that I am fine with her transferring her care to a provider who would be at her delivery for the rest of this pregnancy.  Discussed that I think this may be easier for her and in her best interest.  She is going to think about it and get back to us on her plan.  She does want to deliver at [facility] for sure.      Weight gain adequate: 14.2 kg (31 lb 3.2 oz) to date, out of recommended total of 25-35 lbs (pregravid BMI 18.5-24.9)    Patient Instructions     Delivery Plan     Take me to: [facility] in [place]    My name is: Sagrario Meyer  : 1980    My Doctor is: Dee Webb MD   Attending Physician: [facility]  Prenatal care was at [facility] [phone].    39 year old         -para:   Estimated Date of Delivery: Dec 30, 2019  At 37w3d on 2019  Delivery type: Vaginal Delivery    Patient Active Problem List   Diagnosis     Cystic acne     Latent tuberculosis     Redness of right eye     Chronic right-sided low back pain without sciatica     Vitamin D deficiency     AMA (advanced maternal age) multigravida 35+     Subserous leiomyoma of uterus     Supervision of high risk pregnancy in third trimester     Multigravida of advanced maternal age in third trimester     Allergies:  Allergies   Allergen Reactions     Peanuts [Nuts]        Lab Results:  Blood Type: O POS  GBS:Pending  Date completed:  12/12/2019    Rubella IgG   Date/Time Value Ref Range Status   05/23/2019 02:36 PM Positive  Final     HIV Antigen/Antibody   Date/Time Value Ref Range Status   05/23/2019 02:36 PM Negative Negative Final     No results found for: N5UV  Hepatitis B Surface Antigen   Date/Time Value Ref Range Status   05/23/2019 02:36 PM Negative Negative Final     Hemoglobin   Date Value Ref Range Status   10/28/2019 11.1 (L) 11.7 - 15.7 g/dL Final   09/09/2019 11.0 (L) 11.7 - 15.7 g/dL Final       Last cervical check: December 12, 2019 Cervical Dilation: 1, Effacement:  Long.  Posterior, soft.    Immunization History   Administered Date(s) Administered     HepA-Adult 01/10/2019     HepB-Adult 11/26/2010, 12/30/2010, 08/11/2014     Hepatitis A Immunity: Titer/MD Dx 01/10/2019     Influenza (IIV3) PF 10/25/2018     Influenza Vaccine IM > 6 months Valent IIV4 02/02/2015     Influenza Vaccine, 3 YRS +, IM (QUADRIVALENT W/PRESERVATIVES) 10/10/2019     MMR Not Indicated - By Titer 01/10/2019     Mantoux Tuberculin Skin Test 11/19/2010     TDAP Vaccine (Boostrix) 08/11/2014, 10/10/2019     Tdap (Adult) Unspecified Formulation 11/24/2010     Typhoid IM 01/10/2019     Varicella Immunity: Titer/MD Dx 01/10/2019         Immunizations needed postpartum: none    Who will be present at the delivery?Linh, friend if able.    Do you have a ?No If no, would you like one? Yes     What are your plans for pain control?Natural    Who will cut the umbilical cord?Doctor    Do you plan to breastfeed?Yes    If your baby is a boy, would you like him circumcised?No    Name of baby's clinic: [facility]    Would you like your baby to have the recommended vitamin K shot to prevent bleeding, Hepatitis B shot, and eye ointment to prevent eye infections?Yes        Next Appointment is: 1 week      Return to clinic in 1 week.    Dee Webb MD

## 2019-12-10 ENCOUNTER — OFFICE VISIT (OUTPATIENT)
Dept: OBGYN | Facility: CLINIC | Age: 39
End: 2019-12-10
Attending: ADVANCED PRACTICE MIDWIFE
Payer: COMMERCIAL

## 2019-12-10 VITALS
WEIGHT: 163 LBS | HEART RATE: 94 BPM | BODY MASS INDEX: 27.83 KG/M2 | SYSTOLIC BLOOD PRESSURE: 126 MMHG | DIASTOLIC BLOOD PRESSURE: 83 MMHG | HEIGHT: 64 IN

## 2019-12-10 DIAGNOSIS — O09.93 SUPERVISION OF HIGH RISK PREGNANCY IN THIRD TRIMESTER: Primary | ICD-10-CM

## 2019-12-10 DIAGNOSIS — O09.523 MULTIGRAVIDA OF ADVANCED MATERNAL AGE IN THIRD TRIMESTER: ICD-10-CM

## 2019-12-10 PROBLEM — O09.529 SUPERVISION OF HIGH-RISK PREGNANCY OF ELDERLY MULTIGRAVIDA: Status: RESOLVED | Noted: 2019-05-23 | Resolved: 2019-12-10

## 2019-12-10 PROCEDURE — G0463 HOSPITAL OUTPT CLINIC VISIT: HCPCS | Mod: ZF

## 2019-12-10 ASSESSMENT — ANXIETY QUESTIONNAIRES
6. BECOMING EASILY ANNOYED OR IRRITABLE: NOT AT ALL
1. FEELING NERVOUS, ANXIOUS, OR ON EDGE: NOT AT ALL
5. BEING SO RESTLESS THAT IT IS HARD TO SIT STILL: SEVERAL DAYS
GAD7 TOTAL SCORE: 2
3. WORRYING TOO MUCH ABOUT DIFFERENT THINGS: NOT AT ALL
2. NOT BEING ABLE TO STOP OR CONTROL WORRYING: NOT AT ALL
7. FEELING AFRAID AS IF SOMETHING AWFUL MIGHT HAPPEN: NOT AT ALL

## 2019-12-10 ASSESSMENT — PATIENT HEALTH QUESTIONNAIRE - PHQ9: 5. POOR APPETITE OR OVEREATING: SEVERAL DAYS

## 2019-12-10 ASSESSMENT — MIFFLIN-ST. JEOR: SCORE: 1394.36

## 2019-12-10 NOTE — PROGRESS NOTES
"SUBJECTIVE:   Sagrario is a 39 year old female who presents to clinic for a TRANSFER OF CARE.   at 37w1d with Estimated Date of Delivery: Dec 30, 2019 based on LMP. Feels well.     Denies cramping/contractions, vaginal bleeding, discharge or leakage of fluid. Report +fetal movement.  No HA, vision changes, ruq/epigastric pain.      Patient has been receiving care at [facility] primarily with Dr. Dee Webb.   Reports that that clinic is more convenient for her d/t where she lives so she may continue to go to prenatal appointments there. However, she would like to deliver at [facility] because her friend who is going to be her support and drive her to the hospital lives in [place].     1st visit 19 @ 8+3, last visit 19 @ 35+0, 14 total visits    OTHER CONCERNS:   - Large uterine fibroid- retroplacental- has been getting frequent ultrasounds with MFM  - Maternal age 39- at this time declining IOL at 39 weeks    - MFM ultrasounds: last on - 59%tile, 6lb 9oz: Continue  surveillance with weekly BPP given the history of AMA age 40 or older at the time of delivery. These appointments will be scheduled in our office.  Recommend delivery at 39 weeks gestation given AMA age 40 or older at the time of delivery.\"    All ultrasounds and labs available in Epic.     ===========================================   ROS: 10 point ROS neg other than the symptoms noted above in the HPI.    PSYCHIATRIC:  Denies mood changes, difficulty concentrating, depression, anxiety, panic attacks or post partum depression  PHQ-9 score:    PHQ-9 SCORE 2019   PHQ-9 Total Score 2     YANELY-7 SCORE 2019 10/28/2019 12/10/2019   Total Score 0 1 2         Past History:  Her past medical history   Past Medical History:   Diagnosis Date     Latent tuberculosis 2016    received 9 months of INH   .   This is her first pregnancy  Since her last LMP she denies use of alcohol, tobacco and street " drugs.  HISTORY:  Family History   Problem Relation Age of Onset     Hypertension Mother      No Known Problems Father      Hypertension Sister      No Known Problems Brother      Hypertension Maternal Grandmother      No Known Problems Brother      No Known Problems Paternal Grandfather      No Known Problems Son      No Known Problems Daughter      No Known Problems Maternal Half-Brother      No Known Problems Maternal Half-Sister      No Known Problems Paternal Half-Brother      No Known Problems Paternal Half-Sister      No Known Problems Niece      No Known Problems Nephew      No Known Problems Cousin      No Known Problems Other      Coronary Artery Disease No family hx of      Other Cancer No family hx of      Diabetes No family hx of      Cancer No family hx of      Heart Disease No family hx of      Hyperlipidemia No family hx of      Kidney Disease No family hx of      Cerebrovascular Disease No family hx of      Obesity No family hx of      Thrombosis No family hx of      Asthma No family hx of      Arthritis No family hx of      Thyroid Disease No family hx of      Depression No family hx of      Mental Illness No family hx of      Substance Abuse No family hx of      Cystic Fibrosis No family hx of      Early Death No family hx of      Coronary Artery Disease Early Onset No family hx of      Heart Failure No family hx of      Bleeding Diathesis No family hx of      Dementia No family hx of      Breast Cancer No family hx of      Ovarian Cancer No family hx of      Uterine Cancer No family hx of      Prostate Cancer No family hx of      Colorectal Cancer No family hx of      Pancreatic Cancer No family hx of      Lung Cancer No family hx of      Melanoma No family hx of      Autoimmune Disease No family hx of      Unknown/Adopted No family hx of      Genetic Disorder No family hx of      Social History     Socioeconomic History     Marital status:      Spouse name: Ash Capellan     Number of  children: 0     Years of education: 13     Highest education level: Associate degree: occupational, technical, or vocational program   Occupational History     Occupation: CNA and      Employer: [employer]   Social Needs     Financial resource strain: Not hard at all     Food insecurity:     Worry: Never true     Inability: Never true     Transportation needs:     Medical: No     Non-medical: No   Tobacco Use     Smoking status: Never Smoker     Smokeless tobacco: Never Used   Substance and Sexual Activity     Alcohol use: Never     Frequency: Never     Comment: occ wine     Drug use: Never     Sexual activity: Yes     Partners: Male     Birth control/protection: None   Lifestyle     Physical activity:     Days per week: Not on file     Minutes per session: Not on file     Stress: Not on file   Relationships     Social connections:     Talks on phone: Not on file     Gets together: Not on file     Attends Congregational service: Not on file     Active member of club or organization: Not on file     Attends meetings of clubs or organizations: Not on file     Relationship status: Not on file     Intimate partner violence:     Fear of current or ex partner: Not on file     Emotionally abused: Not on file     Physically abused: Not on file     Forced sexual activity: Not on file   Other Topics Concern     Not on file   Social History Narrative     Not on file     Current Outpatient Medications   Medication Sig     Prenatal Vit-Fe Fumarate-FA (PRENATAL MULTIVITAMIN W/IRON) 27-0.8 MG tablet Take 1 tablet by mouth daily     vitamin D3 (CHOLECALCIFEROL) 2000 units tablet Take 1 tablet by mouth daily     acetaminophen (TYLENOL) 500 MG tablet Take 1-2 tablets (500-1,000 mg) by mouth every 8 hours as needed for mild pain (Patient not taking: Reported on 11/25/2019)     alum & mag hydroxide-simethicone (MAALOX ADVANCED MAX ST) 400-400-40 MG/5ML SUSP suspension Take 30 mLs by mouth 3 times daily as needed for  "heartburn (Patient not taking: Reported on 2019)     hydrocortisone 2.5 % cream Apply to face 2 to 3 times a week. From Dermatology.     ondansetron (ZOFRAN-ODT) 4 MG ODT tab Take 1 tablet (4 mg) by mouth every 8 hours as needed for nausea (Patient not taking: Reported on 2019)     order for DME Equipment being ordered: Double electric Breast Pump Medela brand. (Patient not taking: Reported on 2019)     order for DME Equipment being ordered: Pregnancy support belt.  Ht Readings:  19 : 1.626 m (5' 4\")  Wt Readings:  19 : 62.4 kg (137 lb 9.6 oz) (Patient not taking: Reported on 2019)     ranitidine (ZANTAC) 150 MG tablet Take 1 tablet (150 mg) by mouth 2 times daily as needed for heartburn (Patient not taking: Reported on 2019)     No current facility-administered medications for this visit.      Allergies   Allergen Reactions     Peanuts [Nuts]        ============================================  MEDICAL HISTORY  Past Medical History:   Diagnosis Date     Latent tuberculosis 2016    received 9 months of INH     Past Surgical History:   Procedure Laterality Date     left leg abscess      Hilda     US BREAST CYST ASP ADDL LEFT Left     Hilda       OB History    Para Term  AB Living   1 0 0 0 0 0   SAB TAB Ectopic Multiple Live Births   0 0 0 0 0      # Outcome Date GA Lbr Jasmeet/2nd Weight Sex Delivery Anes PTL Lv   1 Current              I personally reviewed the past social/family/medical and surgical history on the date of service.   I reviewed lab work done at Intake visit with patient.    EXAM:  /83 (BP Location: Left arm, Patient Position: Chair)   Pulse 94   Ht 1.626 m (5' 4\")   Wt 73.9 kg (163 lb)   LMP 2019 (Approximate)   BMI 27.98 kg/m     EXAM:  GENERAL:  Pleasant pregnant female, alert, cooperative and well groomed.  SKIN:  Warm and dry, without lesions or rashes  HEAD: Symmetrical features.  MOUTH:  Buccal mucosa pink, " moist without lesions.      FH: 40  FHTs:140  Leopolds: cephalic      ASSESSMENT:  39 year old , 37w1d weeks of pregnancy with RIO of Dec 30, 2019 by LMP  Intrauterine pregnancy 37w1d size consistent with dates      ICD-10-CM    1. Supervision of high risk pregnancy in third trimester O09.93    2. Multigravida of advanced maternal age in third trimester O09.523        PLAN:  - Reviewed use of triage nurse line and contacting the on-call provider after hours for an urgent need such as fever, vagina bleeding, bladder or vaginal infection, rupture of membranes,  or term labor.      - Records reviewed.  - Reviewed recommendation for weekly BPPs and IOL at 39 weeks. At this time agreeable only to BPPs.   - Recommended GBS collection today. Patient declined.   Plans to have next clinic appointment with previous provider- will complete gbs and cbc with plts at that time.   - Encouraged pt to discuss IOL recommendation with Family Med MD.   - Continue scheduled MFM ultrasounds. Next scheduled , , .  - Pt sent to  for tour- spoke with charge RN.    - Continue scheduled prenatal care, RTC in 1 week and prn if questions or concerns    ASHLEY Hussein, CNM

## 2019-12-10 NOTE — LETTER
"12/10/2019       RE: Sagrario Meyer  [address]  [place] [address]     Dear Colleague,    Thank you for referring your patient, Sagrario Meyer, to the [facility]. Please see a copy of my visit note below.    SUBJECTIVE:   Sagrario is a 39 year old female who presents to clinic for a TRANSFER OF CARE.   at 37w1d with Estimated Date of Delivery: Dec 30, 2019 based on LMP. Feels well.     Denies cramping/contractions, vaginal bleeding, discharge or leakage of fluid. Report +fetal movement.  No HA, vision changes, ruq/epigastric pain.      Patient has been receiving care at [facility] primarily with Dr. Dee Webb.   Reports that that clinic is more convenient for her d/t where she lives so she may continue to go to prenatal appointments there. However, she would like to deliver at [facility] because her friend who is going to be her support and drive her to the hospital lives in [place].     1st visit 19 @ 8+3, last visit 19 @ 35+0, 14 total visits    OTHER CONCERNS:   - Large uterine fibroid- retroplacental- has been getting frequent ultrasounds with MFM  - Maternal age 39- at this time declining IOL at 39 weeks    - MFM ultrasounds: last on - 59%tile, 6lb 9oz: Continue  surveillance with weekly BPP given the history of AMA age 40 or older at the time of delivery. These appointments will be scheduled in our office.  Recommend delivery at 39 weeks gestation given AMA age 40 or older at the time of delivery.\"    All ultrasounds and labs available in Epic.     ===========================================   ROS: 10 point ROS neg other than the symptoms noted above in the HPI.    PSYCHIATRIC:  Denies mood changes, difficulty concentrating, depression, anxiety, panic attacks or post partum depression  PHQ-9 score:    PHQ-9 SCORE 2019   PHQ-9 Total Score 2     YANELY-7 SCORE 2019 " 10/28/2019 12/10/2019   Total Score 0 1 2         Past History:  Her past medical history   Past Medical History:   Diagnosis Date     Latent tuberculosis 2016    received 9 months of INH   .   This is her first pregnancy  Since her last LMP she denies use of alcohol, tobacco and street drugs.  HISTORY:  Family History   Problem Relation Age of Onset     Hypertension Mother      No Known Problems Father      Hypertension Sister      No Known Problems Brother      Hypertension Maternal Grandmother      No Known Problems Brother      No Known Problems Paternal Grandfather      No Known Problems Son      No Known Problems Daughter      No Known Problems Maternal Half-Brother      No Known Problems Maternal Half-Sister      No Known Problems Paternal Half-Brother      No Known Problems Paternal Half-Sister      No Known Problems Niece      No Known Problems Nephew      No Known Problems Cousin      No Known Problems Other      Coronary Artery Disease No family hx of      Other Cancer No family hx of      Diabetes No family hx of      Cancer No family hx of      Heart Disease No family hx of      Hyperlipidemia No family hx of      Kidney Disease No family hx of      Cerebrovascular Disease No family hx of      Obesity No family hx of      Thrombosis No family hx of      Asthma No family hx of      Arthritis No family hx of      Thyroid Disease No family hx of      Depression No family hx of      Mental Illness No family hx of      Substance Abuse No family hx of      Cystic Fibrosis No family hx of      Early Death No family hx of      Coronary Artery Disease Early Onset No family hx of      Heart Failure No family hx of      Bleeding Diathesis No family hx of      Dementia No family hx of      Breast Cancer No family hx of      Ovarian Cancer No family hx of      Uterine Cancer No family hx of      Prostate Cancer No family hx of      Colorectal Cancer No family hx of      Pancreatic Cancer No family hx of      Lung  Cancer No family hx of      Melanoma No family hx of      Autoimmune Disease No family hx of      Unknown/Adopted No family hx of      Genetic Disorder No family hx of      Social History     Socioeconomic History     Marital status:      Spouse name: Ash Capellan     Number of children: 0     Years of education: 13     Highest education level: Associate degree: occupational, technical, or vocational program   Occupational History     Occupation: CNA and      Employer: [employer]   Social Needs     Financial resource strain: Not hard at all     Food insecurity:     Worry: Never true     Inability: Never true     Transportation needs:     Medical: No     Non-medical: No   Tobacco Use     Smoking status: Never Smoker     Smokeless tobacco: Never Used   Substance and Sexual Activity     Alcohol use: Never     Frequency: Never     Comment: occ wine     Drug use: Never     Sexual activity: Yes     Partners: Male     Birth control/protection: None   Lifestyle     Physical activity:     Days per week: Not on file     Minutes per session: Not on file     Stress: Not on file   Relationships     Social connections:     Talks on phone: Not on file     Gets together: Not on file     Attends Adventism service: Not on file     Active member of club or organization: Not on file     Attends meetings of clubs or organizations: Not on file     Relationship status: Not on file     Intimate partner violence:     Fear of current or ex partner: Not on file     Emotionally abused: Not on file     Physically abused: Not on file     Forced sexual activity: Not on file   Other Topics Concern     Not on file   Social History Narrative     Not on file     Current Outpatient Medications   Medication Sig     Prenatal Vit-Fe Fumarate-FA (PRENATAL MULTIVITAMIN W/IRON) 27-0.8 MG tablet Take 1 tablet by mouth daily     vitamin D3 (CHOLECALCIFEROL) 2000 units tablet Take 1 tablet by mouth daily     acetaminophen (TYLENOL)  "500 MG tablet Take 1-2 tablets (500-1,000 mg) by mouth every 8 hours as needed for mild pain (Patient not taking: Reported on 2019)     alum & mag hydroxide-simethicone (MAALOX ADVANCED MAX ST) 400-400-40 MG/5ML SUSP suspension Take 30 mLs by mouth 3 times daily as needed for heartburn (Patient not taking: Reported on 2019)     hydrocortisone 2.5 % cream Apply to face 2 to 3 times a week. From Dermatology.     ondansetron (ZOFRAN-ODT) 4 MG ODT tab Take 1 tablet (4 mg) by mouth every 8 hours as needed for nausea (Patient not taking: Reported on 2019)     order for DME Equipment being ordered: Double electric Breast Pump Medela brand. (Patient not taking: Reported on 2019)     order for DME Equipment being ordered: Pregnancy support belt.  Ht Readings:  19 : 1.626 m (5' 4\")  Wt Readings:  19 : 62.4 kg (137 lb 9.6 oz) (Patient not taking: Reported on 2019)     ranitidine (ZANTAC) 150 MG tablet Take 1 tablet (150 mg) by mouth 2 times daily as needed for heartburn (Patient not taking: Reported on 2019)     No current facility-administered medications for this visit.      Allergies   Allergen Reactions     Peanuts [Nuts]        ============================================  MEDICAL HISTORY  Past Medical History:   Diagnosis Date     Latent tuberculosis 2016    received 9 months of INH     Past Surgical History:   Procedure Laterality Date     left leg abscess      Hilda     US BREAST CYST ASP ADDL LEFT Left     Hilda       OB History    Para Term  AB Living   1 0 0 0 0 0   SAB TAB Ectopic Multiple Live Births   0 0 0 0 0      # Outcome Date GA Lbr Jasmeet/2nd Weight Sex Delivery Anes PTL Lv   1 Current              I personally reviewed the past social/family/medical and surgical history on the date of service.   I reviewed lab work done at Intake visit with patient.    EXAM:  /83 (BP Location: Left arm, Patient Position: Chair)   Pulse 94   Ht " "1.626 m (5' 4\")   Wt 73.9 kg (163 lb)   LMP 2019 (Approximate)   BMI 27.98 kg/m      EXAM:  GENERAL:  Pleasant pregnant female, alert, cooperative and well groomed.  SKIN:  Warm and dry, without lesions or rashes  HEAD: Symmetrical features.  MOUTH:  Buccal mucosa pink, moist without lesions.      FH: 40  FHTs:140  Leopolds: cephalic      ASSESSMENT:  39 year old , 37w1d weeks of pregnancy with RIO of Dec 30, 2019 by LMP  Intrauterine pregnancy 37w1d size consistent with dates      ICD-10-CM    1. Supervision of high risk pregnancy in third trimester O09.93    2. Multigravida of advanced maternal age in third trimester O09.523        PLAN:  - Reviewed use of triage nurse line and contacting the on-call provider after hours for an urgent need such as fever, vagina bleeding, bladder or vaginal infection, rupture of membranes,  or term labor.      - Records reviewed.  - Reviewed recommendation for weekly BPPs and IOL at 39 weeks. At this time agreeable only to BPPs.   - Recommended GBS collection today. Patient declined.   Plans to have next clinic appointment with previous provider- will complete gbs and cbc with plts at that time.   - Encouraged pt to discuss IOL recommendation with Family Med MD.   - Continue scheduled M ultrasounds. Next scheduled , , .  - Pt sent to  for tour- spoke with charge RN.    - Continue scheduled prenatal care, RTC in 1 week and prn if questions or concerns    ASHLEY Hussein, LINDA       "

## 2019-12-11 ASSESSMENT — ANXIETY QUESTIONNAIRES: GAD7 TOTAL SCORE: 2

## 2019-12-12 ENCOUNTER — OFFICE VISIT (OUTPATIENT)
Dept: FAMILY MEDICINE | Facility: CLINIC | Age: 39
End: 2019-12-12
Payer: COMMERCIAL

## 2019-12-12 VITALS
SYSTOLIC BLOOD PRESSURE: 103 MMHG | DIASTOLIC BLOOD PRESSURE: 70 MMHG | BODY MASS INDEX: 27.67 KG/M2 | WEIGHT: 161.2 LBS | HEART RATE: 58 BPM | TEMPERATURE: 98.3 F | RESPIRATION RATE: 16 BRPM

## 2019-12-12 DIAGNOSIS — O09.529 SUPERVISION OF HIGH-RISK PREGNANCY OF ELDERLY MULTIGRAVIDA: Primary | ICD-10-CM

## 2019-12-12 NOTE — PATIENT INSTRUCTIONS
Delivery Plan     Take me to: [place] in [place]    My name is: Sagrario Meyer  : 1980    My Doctor is: Dee Webb MD   Attending Physician: [facility]  Prenatal care was at [facility] [phone].    39 year old         -para:   Estimated Date of Delivery: Dec 30, 2019  At 37w3d on 2019  Delivery type: Vaginal Delivery    Patient Active Problem List   Diagnosis     Cystic acne     Latent tuberculosis     Redness of right eye     Chronic right-sided low back pain without sciatica     Vitamin D deficiency     AMA (advanced maternal age) multigravida 35+     Subserous leiomyoma of uterus     Supervision of high risk pregnancy in third trimester     Multigravida of advanced maternal age in third trimester     Allergies:  Allergies   Allergen Reactions     Peanuts [Nuts]        Lab Results:  Blood Type: O POS  GBS:Pending  Date completed: 2019    Rubella IgG   Date/Time Value Ref Range Status   2019 02:36 PM Positive  Final     HIV Antigen/Antibody   Date/Time Value Ref Range Status   2019 02:36 PM Negative Negative Final     No results found for: N5UV  Hepatitis B Surface Antigen   Date/Time Value Ref Range Status   2019 02:36 PM Negative Negative Final     Hemoglobin   Date Value Ref Range Status   10/28/2019 11.1 (L) 11.7 - 15.7 g/dL Final   2019 11.0 (L) 11.7 - 15.7 g/dL Final       Last cervical check: 2019 Cervical Dilation: 1, Effacement:  Long.  Posterior, soft.    Immunization History   Administered Date(s) Administered     HepA-Adult 01/10/2019     HepB-Adult 2010, 2010, 2014     Hepatitis A Immunity: Titer/MD Dx 01/10/2019     Influenza (IIV3) PF 10/25/2018     Influenza Vaccine IM > 6 months Valent IIV4 2015     Influenza Vaccine, 3 YRS +, IM (QUADRIVALENT W/PRESERVATIVES) 10/10/2019     MMR Not Indicated - By Titer 01/10/2019     Mantoux Tuberculin Skin  Test 11/19/2010     TDAP Vaccine (Boostrix) 08/11/2014, 10/10/2019     Tdap (Adult) Unspecified Formulation 11/24/2010     Typhoid IM 01/10/2019     Varicella Immunity: Titer/MD Dx 01/10/2019         Immunizations needed postpartum: none    Who will be present at the delivery?Linh, friend if able.    Do you have a ?No If no, would you like one? Yes     What are your plans for pain control?Natural    Who will cut the umbilical cord?Doctor    Do you plan to breastfeed?Yes    If your baby is a boy, would you like him circumcised?No    Name of baby's clinic: [facility]    Would you like your baby to have the recommended vitamin K shot to prevent bleeding, Hepatitis B shot, and eye ointment to prevent eye infections?Yes        Next Appointment is: 1 week

## 2019-12-12 NOTE — LETTER
December 17, 2019      Sagrario Meyer  [address]   [place] [address]        Dear Sagrario,    Please see below for your test results.    Resulted Orders   Clt. Grp B Strp Screen ([employer]   Result Value Ref Range    Group B Strep Antigen Negative Negative    Allergic To Penicillin No     Narrative    Test performed by:  [facility]  [address] [place] [address]  Intended use:  The [employer] Xpert GBS LB Assay, performed on the [employer]  Instrument   Systems, is a qualitative in vitro diagnostic test designed to detect Group B   Streptococcus (GBS) DNA from enriched vaginal/rectal swab specimens, using   fully automated realtime polymerase chain reaction (PCR) with fluorogenic   detection of the amplified DNA. Xpert GBS LB Assay testing is indicated as an   aid in determining GBS colonization status in antepartum women. This assay   does not diagnose or monitor treatment for GBS infections.  The Re[employer] Pros Xpert   GBS LB Assay is intended for use in hospital, reference or state laboratory   settings.  The device is not intended for point-of-care use.  Methodology:  The [employer] Instrument Systems automate and integrate sample lysis, nucleic   acid purification and amplification, and detection of the target sequence in   simple or complex samples using real-time polymerase chain reaction (PCR). The   GBS primers and probe detect a target within a 3' DNA region adjacent to the   cfb gene of S. agalactiae. A fluorescent signal becomes detected and increases   each time the specific DNA strand is amplified. The Real-time PCR generates a   growth curve with number of cycles on the x-axis and fluorescence on the   y-axis. If the organism s DNA is not detected by the real-time PCR reaction   the growth curve will be flat and will be resulted as negative.                     Good news! Your Group B strep test was negative.    If you have any questions, please call the clinic to make an  appointment.    Sincerely,    Dee Webb MD

## 2019-12-13 ENCOUNTER — RECORDS - HEALTHEAST (OUTPATIENT)
Dept: ADMINISTRATIVE | Facility: OTHER | Age: 39
End: 2019-12-13

## 2019-12-13 ENCOUNTER — OFFICE VISIT - HEALTHEAST (OUTPATIENT)
Dept: MATERNAL FETAL MEDICINE | Facility: HOSPITAL | Age: 39
End: 2019-12-13

## 2019-12-13 ENCOUNTER — RECORDS - HEALTHEAST (OUTPATIENT)
Dept: ULTRASOUND IMAGING | Facility: HOSPITAL | Age: 39
End: 2019-12-13

## 2019-12-13 DIAGNOSIS — O09.523 SUPERVISION OF ELDERLY MULTIGRAVIDA, THIRD TRIMESTER: ICD-10-CM

## 2019-12-13 DIAGNOSIS — O09.523 ADVANCED MATERNAL AGE IN MULTIGRAVIDA, THIRD TRIMESTER: ICD-10-CM

## 2019-12-13 DIAGNOSIS — D25.9 LEIOMYOMA OF UTERUS, UNSPECIFIED: ICD-10-CM

## 2019-12-13 DIAGNOSIS — O34.10 MATERNAL CARE FOR BENIGN TUMOR OF CORPUS UTERI, UNSPECIFIED TRIMESTER: ICD-10-CM

## 2019-12-14 LAB
ALLERGIC TO PENICILLIN: NO
GP B STREP AG SPEC QL LA: NEGATIVE

## 2019-12-18 ENCOUNTER — OFFICE VISIT (OUTPATIENT)
Dept: OBGYN | Facility: CLINIC | Age: 39
End: 2019-12-18
Attending: ADVANCED PRACTICE MIDWIFE
Payer: COMMERCIAL

## 2019-12-18 VITALS
HEIGHT: 64 IN | HEART RATE: 95 BPM | DIASTOLIC BLOOD PRESSURE: 68 MMHG | SYSTOLIC BLOOD PRESSURE: 108 MMHG | BODY MASS INDEX: 28.38 KG/M2 | WEIGHT: 166.2 LBS

## 2019-12-18 DIAGNOSIS — O09.93 HRP (HIGH RISK PREGNANCY), THIRD TRIMESTER: Primary | ICD-10-CM

## 2019-12-18 DIAGNOSIS — O09.523 MULTIGRAVIDA OF ADVANCED MATERNAL AGE IN THIRD TRIMESTER: ICD-10-CM

## 2019-12-18 PROCEDURE — G0463 HOSPITAL OUTPT CLINIC VISIT: HCPCS | Mod: ZF

## 2019-12-18 ASSESSMENT — PAIN SCALES - GENERAL: PAINLEVEL: NO PAIN (0)

## 2019-12-18 ASSESSMENT — MIFFLIN-ST. JEOR: SCORE: 1408.88

## 2019-12-18 NOTE — PROGRESS NOTES
"Subjective:     39 year old  at 38w2d presents for routine prenatal visit. Now has decided to only have MD care at [facility].  Continues to decline IOL at 39 weeks, agrees to BPP's weekly.           Denies vaginal bleeding or leakage of fluid.  Denies contractions.  Reports regular fetal movement.        No HA, visual changes, RUQ or epigastric pain.   Patient concerns: Concerned about late transfer of care and new recommendation for IOL.  Feeling well overall.  Objective:  Vitals:    19 1618   BP: 108/68   Pulse: 95   Weight: 75.4 kg (166 lb 3.2 oz)   Height: 1.626 m (5' 4\")    See OB flowsheet  Assessment/Plan     Encounter Diagnosis   Name Primary?     HRP (high risk pregnancy), third trimester Yes     Orders Placed This Encounter   Procedures     US OB Fetal Biophys Prf wo NonStrs Singls Sgl         - Reviewed why/how to contact provider if headache/visual changes/RUQ or epigastric pain, decreased fetal movement, vaginal bleeding, leakage of fluid or strong/regular contractions.   Patient education/orders or handouts today:  Sign/symptoms of labor, When to call for labor or other concerns, BPP and Induction of labor  Return to clinic on 19 week and prn if questions or concerns.   ASHLEY Tello CNM    "

## 2019-12-18 NOTE — LETTER
"2019       RE: Sagrario Meyer  [address]  [place] [address]     Dear Colleague,    Thank you for referring your patient, Sagrario Meyer, to the [facility] at [facility]. Please see a copy of my visit note below.    Subjective:     39 year old  at 38w2d presents for routine prenatal visit. Now has decided to only have MD care at [facility].  Continues to decline IOL at 39 weeks, agrees to BPP's weekly.  Denies vaginal bleeding or leakage of fluid.  Denies contractions.  Reports regular fetal movement.       No HA, visual changes, RUQ or epigastric pain.   Patient concerns: Concerned about late transfer of care and new recommendation for IOL.  Feeling well overall.    Objective:  Vitals:    19 1618   BP: 108/68   Pulse: 95   Weight: 75.4 kg (166 lb 3.2 oz)   Height: 1.626 m (5' 4\")    See OB flowsheet    Assessment/Plan  Encounter Diagnosis   Name Primary?     HRP (high risk pregnancy), third trimester Yes     Orders Placed This Encounter   Procedures     US OB Fetal Biophys Prf wo NonStrs Singls Sgl     - Reviewed why/how to contact provider if headache/visual changes/RUQ or epigastric pain, decreased fetal movement, vaginal bleeding, leakage of fluid or strong/regular contractions.   Patient education/orders or handouts today:  Sign/symptoms of labor, When to call for labor or other concerns, BPP and Induction of labor  Return to clinic on 19 week and prn if questions or concerns.     Yessi Iniguez, APRN CNM          "

## 2019-12-19 ENCOUNTER — OFFICE VISIT - HEALTHEAST (OUTPATIENT)
Dept: MATERNAL FETAL MEDICINE | Facility: HOSPITAL | Age: 39
End: 2019-12-19

## 2019-12-19 ENCOUNTER — TELEPHONE (OUTPATIENT)
Dept: FAMILY MEDICINE | Facility: CLINIC | Age: 39
End: 2019-12-19

## 2019-12-19 ENCOUNTER — RECORDS - HEALTHEAST (OUTPATIENT)
Dept: ADMINISTRATIVE | Facility: OTHER | Age: 39
End: 2019-12-19

## 2019-12-19 ENCOUNTER — RECORDS - HEALTHEAST (OUTPATIENT)
Dept: ULTRASOUND IMAGING | Facility: HOSPITAL | Age: 39
End: 2019-12-19

## 2019-12-19 DIAGNOSIS — O09.523 SUPERVISION OF ELDERLY MULTIGRAVIDA, THIRD TRIMESTER: ICD-10-CM

## 2019-12-19 DIAGNOSIS — O09.513 ADVANCED MATERNAL AGE, PRIMIGRAVIDA, ANTEPARTUM, THIRD TRIMESTER: ICD-10-CM

## 2019-12-19 DIAGNOSIS — D25.9 LEIOMYOMA OF UTERUS, UNSPECIFIED: ICD-10-CM

## 2019-12-19 DIAGNOSIS — O34.10 MATERNAL CARE FOR BENIGN TUMOR OF CORPUS UTERI, UNSPECIFIED TRIMESTER: ICD-10-CM

## 2019-12-19 NOTE — TELEPHONE ENCOUNTER
Pt states that she saw the doctor at the [facility] and the doctor told her that she should be induced at 39 wks due to her age.  Pt states that she wants to at least wait until her due date and is hoping she can go into labor on her own.  Discussed that it is her choice and as long as the ultrasounds (BPP) are looking okay it is probably okay to wait a little longer.  Told her to continue to discuss this with the OBGYN at her next appt on 12/23.  Pt states that she wants to continue to have her ultrasounds at [facility] in [place] because it is easier to drive there.  Told her to let both [facility] and the OBGYN know that she would like to do that and as long as continues to have BPPs weekly that it is okay.  Briefly discussed what happens with induction including cervical ripeners, Pitocin, and AROM.  Pt verbalized understanding and denies further questions./NG

## 2019-12-19 NOTE — TELEPHONE ENCOUNTER
[facility] phone call message- general phone call:    Reason for call: Pt has questions about delivery     Action desired: would like to to talk to [place].    Return call needed: Yes    OK to leave a message on voice mail? Yes    Advised patient to response may take up to 2 business days: Yes    Primary language: English      needed? No    Call taken on December 19, 2019 at 8:32 AM by Cielo Granados

## 2019-12-23 ENCOUNTER — RECORDS - HEALTHEAST (OUTPATIENT)
Dept: ADMINISTRATIVE | Facility: OTHER | Age: 39
End: 2019-12-23

## 2019-12-23 ENCOUNTER — OFFICE VISIT - HEALTHEAST (OUTPATIENT)
Dept: MATERNAL FETAL MEDICINE | Facility: HOSPITAL | Age: 39
End: 2019-12-23

## 2019-12-23 ENCOUNTER — TELEPHONE (OUTPATIENT)
Dept: OBGYN | Facility: CLINIC | Age: 39
End: 2019-12-23

## 2019-12-23 ENCOUNTER — RECORDS - HEALTHEAST (OUTPATIENT)
Dept: ULTRASOUND IMAGING | Facility: HOSPITAL | Age: 39
End: 2019-12-23

## 2019-12-23 DIAGNOSIS — O09.513 ADVANCED MATERNAL AGE, PRIMIGRAVIDA, ANTEPARTUM, THIRD TRIMESTER: ICD-10-CM

## 2019-12-23 DIAGNOSIS — O34.10 MATERNAL CARE FOR BENIGN TUMOR OF CORPUS UTERI, UNSPECIFIED TRIMESTER: ICD-10-CM

## 2019-12-23 DIAGNOSIS — D25.9 LEIOMYOMA OF UTERUS, UNSPECIFIED: ICD-10-CM

## 2019-12-23 DIAGNOSIS — O09.523 SUPERVISION OF ELDERLY MULTIGRAVIDA, THIRD TRIMESTER: ICD-10-CM

## 2019-12-23 NOTE — TELEPHONE ENCOUNTER
Sagrario is calling to coordinate induction. Per notes, patient has been recommended for induction at 39 weeks due to age but she has been deferring this.     Sagrario is now agreeable to induction and would like to schedule this for Thursday, 12/26.     Discussed with CNM on call who agrees appropriate to schedule induction on 12/26. Scheduled induction with birth place on 12/26 at 4:00, which patient confirms. Cancelled clinic visit for 12/26.

## 2019-12-26 ENCOUNTER — HOSPITAL ENCOUNTER (INPATIENT)
Facility: CLINIC | Age: 39
LOS: 7 days | Discharge: HOME HEALTH | End: 2020-01-02
Attending: ADVANCED PRACTICE MIDWIFE | Admitting: OBSTETRICS & GYNECOLOGY
Payer: COMMERCIAL

## 2019-12-26 DIAGNOSIS — Z98.891 S/P CESAREAN SECTION: Primary | ICD-10-CM

## 2019-12-26 LAB
ABO + RH BLD: NORMAL
ABO + RH BLD: NORMAL
BASOPHILS # BLD AUTO: 0 10E9/L (ref 0–0.2)
BASOPHILS NFR BLD AUTO: 0.2 %
BLD GP AB SCN SERPL QL: NORMAL
BLOOD BANK CMNT PATIENT-IMP: NORMAL
DIFFERENTIAL METHOD BLD: NORMAL
EOSINOPHIL # BLD AUTO: 0.1 10E9/L (ref 0–0.7)
EOSINOPHIL NFR BLD AUTO: 0.6 %
ERYTHROCYTE [DISTWIDTH] IN BLOOD BY AUTOMATED COUNT: 13.2 % (ref 10–15)
HCT VFR BLD AUTO: 38.6 % (ref 35–47)
HGB BLD-MCNC: 12.7 G/DL (ref 11.7–15.7)
IMM GRANULOCYTES # BLD: 0.1 10E9/L (ref 0–0.4)
IMM GRANULOCYTES NFR BLD: 1.2 %
LYMPHOCYTES # BLD AUTO: 1.8 10E9/L (ref 0.8–5.3)
LYMPHOCYTES NFR BLD AUTO: 16.9 %
MCH RBC QN AUTO: 31.8 PG (ref 26.5–33)
MCHC RBC AUTO-ENTMCNC: 32.9 G/DL (ref 31.5–36.5)
MCV RBC AUTO: 97 FL (ref 78–100)
MONOCYTES # BLD AUTO: 1 10E9/L (ref 0–1.3)
MONOCYTES NFR BLD AUTO: 9.2 %
NEUTROPHILS # BLD AUTO: 7.5 10E9/L (ref 1.6–8.3)
NEUTROPHILS NFR BLD AUTO: 71.9 %
NRBC # BLD AUTO: 0 10*3/UL
NRBC BLD AUTO-RTO: 0 /100
PLATELET # BLD AUTO: 154 10E9/L (ref 150–450)
RBC # BLD AUTO: 3.99 10E12/L (ref 3.8–5.2)
SPECIMEN EXP DATE BLD: NORMAL
WBC # BLD AUTO: 10.4 10E9/L (ref 4–11)

## 2019-12-26 PROCEDURE — 12000001 ZZH R&B MED SURG/OB UMMC

## 2019-12-26 PROCEDURE — 86850 RBC ANTIBODY SCREEN: CPT | Performed by: ADVANCED PRACTICE MIDWIFE

## 2019-12-26 PROCEDURE — 86780 TREPONEMA PALLIDUM: CPT | Performed by: ADVANCED PRACTICE MIDWIFE

## 2019-12-26 PROCEDURE — 86900 BLOOD TYPING SEROLOGIC ABO: CPT | Performed by: ADVANCED PRACTICE MIDWIFE

## 2019-12-26 PROCEDURE — 86901 BLOOD TYPING SEROLOGIC RH(D): CPT | Performed by: ADVANCED PRACTICE MIDWIFE

## 2019-12-26 PROCEDURE — 25000132 ZZH RX MED GY IP 250 OP 250 PS 637: Performed by: ADVANCED PRACTICE MIDWIFE

## 2019-12-26 PROCEDURE — 25800030 ZZH RX IP 258 OP 636: Performed by: ADVANCED PRACTICE MIDWIFE

## 2019-12-26 PROCEDURE — 85025 COMPLETE CBC W/AUTO DIFF WBC: CPT | Performed by: ADVANCED PRACTICE MIDWIFE

## 2019-12-26 RX ORDER — FENTANYL CITRATE 50 UG/ML
50-100 INJECTION, SOLUTION INTRAMUSCULAR; INTRAVENOUS
Status: DISCONTINUED | OUTPATIENT
Start: 2019-12-26 | End: 2019-12-31

## 2019-12-26 RX ORDER — CARBOPROST TROMETHAMINE 250 UG/ML
250 INJECTION, SOLUTION INTRAMUSCULAR
Status: DISCONTINUED | OUTPATIENT
Start: 2019-12-26 | End: 2019-12-31

## 2019-12-26 RX ORDER — OXYCODONE AND ACETAMINOPHEN 5; 325 MG/1; MG/1
1 TABLET ORAL
Status: DISCONTINUED | OUTPATIENT
Start: 2019-12-26 | End: 2019-12-31

## 2019-12-26 RX ORDER — MORPHINE SULFATE 10 MG/ML
10 INJECTION, SOLUTION INTRAMUSCULAR; INTRAVENOUS
Status: DISCONTINUED | OUTPATIENT
Start: 2019-12-26 | End: 2019-12-31

## 2019-12-26 RX ORDER — HYDROXYZINE HYDROCHLORIDE 50 MG/1
100 TABLET, FILM COATED ORAL
Status: DISCONTINUED | OUTPATIENT
Start: 2019-12-26 | End: 2019-12-27

## 2019-12-26 RX ORDER — MISOPROSTOL 100 UG/1
25 TABLET ORAL EVERY 4 HOURS PRN
Status: DISCONTINUED | OUTPATIENT
Start: 2019-12-26 | End: 2019-12-28

## 2019-12-26 RX ORDER — IBUPROFEN 800 MG/1
800 TABLET, FILM COATED ORAL
Status: DISCONTINUED | OUTPATIENT
Start: 2019-12-26 | End: 2019-12-31

## 2019-12-26 RX ORDER — SODIUM CHLORIDE, SODIUM LACTATE, POTASSIUM CHLORIDE, CALCIUM CHLORIDE 600; 310; 30; 20 MG/100ML; MG/100ML; MG/100ML; MG/100ML
INJECTION, SOLUTION INTRAVENOUS CONTINUOUS
Status: DISCONTINUED | OUTPATIENT
Start: 2019-12-26 | End: 2019-12-27

## 2019-12-26 RX ORDER — METHYLERGONOVINE MALEATE 0.2 MG/ML
200 INJECTION INTRAVENOUS
Status: DISCONTINUED | OUTPATIENT
Start: 2019-12-26 | End: 2019-12-31

## 2019-12-26 RX ORDER — ACETAMINOPHEN 325 MG/1
650 TABLET ORAL EVERY 4 HOURS PRN
Status: DISCONTINUED | OUTPATIENT
Start: 2019-12-26 | End: 2019-12-31

## 2019-12-26 RX ORDER — OXYTOCIN 10 [USP'U]/ML
10 INJECTION, SOLUTION INTRAMUSCULAR; INTRAVENOUS
Status: DISCONTINUED | OUTPATIENT
Start: 2019-12-26 | End: 2019-12-31

## 2019-12-26 RX ORDER — ONDANSETRON 2 MG/ML
4 INJECTION INTRAMUSCULAR; INTRAVENOUS EVERY 6 HOURS PRN
Status: DISCONTINUED | OUTPATIENT
Start: 2019-12-26 | End: 2019-12-31

## 2019-12-26 RX ORDER — NALOXONE HYDROCHLORIDE 0.4 MG/ML
.1-.4 INJECTION, SOLUTION INTRAMUSCULAR; INTRAVENOUS; SUBCUTANEOUS
Status: DISCONTINUED | OUTPATIENT
Start: 2019-12-26 | End: 2019-12-30

## 2019-12-26 RX ORDER — OXYTOCIN/0.9 % SODIUM CHLORIDE 30/500 ML
100-340 PLASTIC BAG, INJECTION (ML) INTRAVENOUS CONTINUOUS PRN
Status: DISCONTINUED | OUTPATIENT
Start: 2019-12-26 | End: 2019-12-31

## 2019-12-26 RX ADMIN — SODIUM CHLORIDE, POTASSIUM CHLORIDE, SODIUM LACTATE AND CALCIUM CHLORIDE: 600; 310; 30; 20 INJECTION, SOLUTION INTRAVENOUS at 22:18

## 2019-12-26 RX ADMIN — Medication 25 MCG: at 17:55

## 2019-12-26 NOTE — PLAN OF CARE
Data: Patient presented to [facility] at 1520.   Reason for maternal/fetal assessment per patient is Induction Of Labor  .  Patient is a . Prenatal record reviewed.      OB History    Para Term  AB Living   1 0 0 0 0 0   SAB TAB Ectopic Multiple Live Births   0 0 0 0 0      # Outcome Date GA Lbr Jasmeet/2nd Weight Sex Delivery Anes PTL Lv   1 Current            . Medical history:   Past Medical History:   Diagnosis Date     Latent tuberculosis 2016    received 9 months of INH   . Gestational Age 39w3d. VSS. Fetal movement present. Patient denies cramping, backache, vaginal discharge, pelvic pressure, UTI symptoms, GI problems, bloody show, vaginal bleeding, edema, headache, visual disturbances, epigastric or URQ pain, abdominal pain, rupture of membranes. Support persons not present.  Action: Verbal consent for EFM. Triage assessment completed. EFM applied. Uterine assessment contractions present. Fetal assessment: Presumed adequate fetal oxygenation documented (see flow record).   Response: Robert Duggan CNM,  informed of arrival. Plan per provider is assess plan for induction. Patient verbalized agreement with plan. Patient transferred to room 477 ambulatory, oriented to room and call light.

## 2019-12-27 LAB — T PALLIDUM AB SER QL: NONREACTIVE

## 2019-12-27 PROCEDURE — 25800030 ZZH RX IP 258 OP 636: Performed by: ADVANCED PRACTICE MIDWIFE

## 2019-12-27 PROCEDURE — 12000001 ZZH R&B MED SURG/OB UMMC

## 2019-12-27 PROCEDURE — 25000132 ZZH RX MED GY IP 250 OP 250 PS 637: Performed by: ADVANCED PRACTICE MIDWIFE

## 2019-12-27 RX ORDER — MISOPROSTOL 100 UG/1
25 TABLET ORAL
Status: DISCONTINUED | OUTPATIENT
Start: 2019-12-27 | End: 2019-12-27

## 2019-12-27 RX ORDER — HYDROXYZINE HYDROCHLORIDE 25 MG/1
100 TABLET, FILM COATED ORAL
Status: DISCONTINUED | OUTPATIENT
Start: 2019-12-27 | End: 2019-12-31

## 2019-12-27 RX ADMIN — Medication 25 MCG: at 05:23

## 2019-12-27 RX ADMIN — Medication 25 MCG: at 13:38

## 2019-12-27 RX ADMIN — Medication 25 MCG: at 18:21

## 2019-12-27 RX ADMIN — Medication 25 MCG: at 15:50

## 2019-12-27 RX ADMIN — Medication 25 MCG: at 20:22

## 2019-12-27 RX ADMIN — Medication 25 MCG: at 07:46

## 2019-12-27 RX ADMIN — SODIUM CHLORIDE, POTASSIUM CHLORIDE, SODIUM LACTATE AND CALCIUM CHLORIDE 250 ML: 600; 310; 30; 20 INJECTION, SOLUTION INTRAVENOUS at 23:30

## 2019-12-27 RX ADMIN — Medication 25 MCG: at 10:58

## 2019-12-27 NOTE — PROVIDER NOTIFICATION
12/27/19 0507   Provider Notification   Provider Name/Title DESMOND Simental CNM   Method of Notification Phone   Request Evaluate - Remote   Notification Reason Labor Status   Ctx now 3-5. pt resting. Orders to give oral miso now. Continue to monitior.

## 2019-12-27 NOTE — PROGRESS NOTES
Blood pressure 124/73, temperature 98.7  F (37.1  C), temperature source Oral, resp. rate 18, last menstrual period 03/25/2019, not currently breastfeeding.  Patient Vitals for the past 24 hrs:   BP Temp Temp src Resp   12/26/19 1919 124/73 98.7  F (37.1  C) Oral 18   12/26/19 1610 123/76 -- -- 18   12/26/19 1553 (!) 140/77 98.3  F (36.8  C) Oral 18     General appearance: comfortable  Had first dose of vaginal miso. Not feeling contractions. Eating dinner.  here and supportive.  CONTACTIONS: mild and every 4-6 minutes  Pitocin- none,  Antibiotics- none  FETAL HEART TONES: continuous EFM- baseline 140 with moderate variability and positive accelerations. No decelerations.  ROM: not ruptured  PELVIC EXAM:deferred  EFW 7lb  Soft anterior fundal fibroid palpated, nontender  # Pain Assessment:  Current Pain Score 12/26/2019   Patient currently in pain? beatriz   Sagrario s pain level was assessed and she currently denies pain.    Results for orders placed or performed during the hospital encounter of 12/26/19   CBC with platelets differential     Status: None   Result Value Ref Range    WBC 10.4 4.0 - 11.0 10e9/L    RBC Count 3.99 3.8 - 5.2 10e12/L    Hemoglobin 12.7 11.7 - 15.7 g/dL    Hematocrit 38.6 35.0 - 47.0 %    MCV 97 78 - 100 fl    MCH 31.8 26.5 - 33.0 pg    MCHC 32.9 31.5 - 36.5 g/dL    RDW 13.2 10.0 - 15.0 %    Platelet Count 154 150 - 450 10e9/L    Diff Method Automated Method     % Neutrophils 71.9 %    % Lymphocytes 16.9 %    % Monocytes 9.2 %    % Eosinophils 0.6 %    % Basophils 0.2 %    % Immature Granulocytes 1.2 %    Nucleated RBCs 0 0 /100    Absolute Neutrophil 7.5 1.6 - 8.3 10e9/L    Absolute Lymphocytes 1.8 0.8 - 5.3 10e9/L    Absolute Monocytes 1.0 0.0 - 1.3 10e9/L    Absolute Eosinophils 0.1 0.0 - 0.7 10e9/L    Absolute Basophils 0.0 0.0 - 0.2 10e9/L    Abs Immature Granulocytes 0.1 0 - 0.4 10e9/L    Absolute Nucleated RBC 0.0    ABO/Rh type and screen     Status: None   Result Value Ref Range     ABO O     RH(D) Pos     Antibody Screen Neg     Test Valid Only At          [facility]    Specimen Expires 12/29/2019        ASSESSMENT:  ==============  IUP @ 39w3d for induction of labor.  Indication: AMA at term and polyhydramnios   Uterine myoma  Fetal Heart Rate Tracing category one  GBS- negative  Patient Active Problem List   Diagnosis     Cystic acne     Latent tuberculosis     Redness of right eye     Chronic right-sided low back pain without sciatica     Vitamin D deficiency     AMA (advanced maternal age) multigravida 35+     Subserous leiomyoma of uterus     Supervision of high risk pregnancy WHDYLLAN SHIPLEY care     Multigravida of advanced maternal age in third trimester     Labor and delivery, indication for care     PLAN:  ===========  Continue with cervical ripening with vaginal misoprostol. Pt agreeable to plan  Reviewed vistaril prn for sleep  Ambulation, hydration, position changes, birthing ball/sling and tub options to facilitate labor.  Pain medication options reviewed with pt. Pt is interested in unmedicated birth  Reevaluate in 2-4 hours prn   ASHLEY Bergeron CNM

## 2019-12-27 NOTE — PROGRESS NOTES
"[facility] Labor and Delivery Progress Note    Sagrario Meyer MRN# [id number]   Age: 39 year old YOB: 1980         Subjective:   Sagrario is in bed and has a different friend in supporting her. Her friend says \"I told her she'd have an army of women supporting her\". Sagrario is not uncomfortable, but is a bit annoyed by the FHR monitor making all of the beeping noises            Objective:     Patient Vitals for the past 24 hrs:   BP Temp Temp src Pulse Resp Oximeter Heart Rate   19 1530 127/67 -- -- -- 18 79 bpm   19 1330 120/67 -- -- 97 18 --   19 1125 121/72 -- -- -- 18 78 bpm   19 1100 112/68 98.3  F (36.8  C) Oral 89 16 --   19 0935 114/68 -- -- -- 16 83 bpm   19 0730 124/76 98.3  F (36.8  C) Oral -- 16 99 bpm   19 0310 119/70 98.5  F (36.9  C) Oral -- 16 77 bpm   19 2357 109/53 98.4  F (36.9  C) Oral -- 16 93 bpm   19 1919 124/73 98.7  F (37.1  C) Oral -- 18 --       Cervical Exam: deferred.    Membranes: Intact     Fetal Heart Rate:    Monitor: terrance    Variability: moderate (amplitude range 6 to 25 bpm)    Baseline Rate: normal range    Fetal Heart Rate Tracing: cat I     Contractions: 2-3.5 minutes lasting 40-60 seconds, mild by palpation    Due for next miso around 1800.          Assessment:   Sagrario Meyer is a 39 year old  who is 39w4d here with IOL for polyhydramnios and AMA.         Plan:   Reviewed that at around that time it would be 24 hrs of miso and would make sense to reassess her cervix. Discussed change of shift with Jamey MCKEON coming on at 1830 so pt and support aware. Will plan to wait for Jamey to reassess as she will be managing labor for next 12 hrs. Managed expectations and that delivery may not be until tomorrow.   Reviewed ok for one more dose of oral miso if agreeable to oncoming midwife.   Continue with upright positioning and ambulation.      Rachel Solis    "

## 2019-12-27 NOTE — PROGRESS NOTES
[facility] Labor and Delivery Progress Note    Sagrario Meyer MRN# [id number]   Age: 39 year old YOB: 1980         Subjective:   Pt ate breakfast and is up in room with .           Objective:     Patient Vitals for the past 24 hrs:   BP Temp Temp src Pulse Resp Oximeter Heart Rate   19 1125 121/72 -- -- -- 18 78 bpm   19 1100 112/68 98.3  F (36.8  C) Oral 89 16 --   19 0935 114/68 -- -- -- 16 83 bpm   19 0730 124/76 98.3  F (36.8  C) Oral -- 16 99 bpm   19 0310 119/70 98.5  F (36.9  C) Oral -- 16 77 bpm   19 2357 109/53 98.4  F (36.9  C) Oral -- 16 93 bpm   19 1919 124/73 98.7  F (37.1  C) Oral -- 18 --   19 1610 123/76 -- -- -- 18 83 bpm   19 1553 (!) 140/77 98.3  F (36.8  C) Oral -- 18 80 bpm         Cervical Exam: 0 / 60% / -2      Position: Posterior    Membranes: Intact     Fetal Heart Rate:    Monitor: terrance    Variability: minimal variability   Baseline Rate: normal range    Fetal Heart Rate Tracing: cat II    Contractions: terrance q 1-5 minutes lasting 50-70 seconds, mild.     Miso: last dose oral at 1338 (4 oral and 1 vaginal total).        Assessment:   Sagrario Meyer is a 39 year old  who is 39w4d here with IOL for AMA and polyhydramnios.           Plan:   Reviewed use of law again with pt and . Pt is agreeable to an SVE and if, possible placement of law bulb.   Unable to place law as cervix is still closed.  Continue with cervical ripening with misoprostol, oral vs vaginal as Sagrario has a very difficult time with vaginal exams (does better with nitrous).       Rachel Solis

## 2019-12-27 NOTE — PROGRESS NOTES
Blood pressure 119/70, temperature 98.5  F (36.9  C), temperature source Oral, resp. rate 16, last menstrual period 03/25/2019, not currently breastfeeding.  Patient Vitals for the past 24 hrs:   BP Temp Temp src Resp   12/27/19 0310 119/70 98.5  F (36.9  C) Oral 16   12/26/19 2357 109/53 98.4  F (36.9  C) Oral 16   12/26/19 1919 124/73 98.7  F (37.1  C) Oral 18   12/26/19 1610 123/76 -- -- 18   12/26/19 1553 (!) 140/77 98.3  F (36.8  C) Oral 18     General appearance: comfortable  Intermittent cramping but contr q2-3 throughout the night. Has been up in room and on ball. Now resting in bed. No bleeding or ROM  CONTACTIONS: mild, cramping and every 2-3 minutes  Vaginal misoprostol x1 dose at 1800pm  FETAL HEART TONES: continuous EFM- baseline 135 with moderate variability and positive accelerations. No decelerations.  ROM: not ruptured  PELVIC EXAM:deferred  # Pain Assessment:  Current Pain Score 12/27/2019   Patient currently in pain? denies   Sagrario s pain level was assessed and she currently denies pain.      ASSESSMENT:  ==============  IUP @ 39w4d for induction of labor.  Indication: AMA at term and polyhydramnios   Fetal Heart Rate Tracing category one  GBS- negative  Patient Active Problem List   Diagnosis     Cystic acne     Latent tuberculosis     Redness of right eye     Chronic right-sided low back pain without sciatica     Vitamin D deficiency     AMA (advanced maternal age) multigravida 35+     Subserous leiomyoma of uterus     Supervision of high risk pregnancy WHDYLLAN SHIPLEY care     Multigravida of advanced maternal age in third trimester     Labor and delivery, indication for care     PLAN:  ===========  Encourage rest now   Repeat misoprostol if contractions space out. Otherwise reassess for possibility of cook catheter.  ASHLEY BergeronM

## 2019-12-27 NOTE — PROVIDER NOTIFICATION
12/27/19 0410   Provider Notification   Provider Name/Title DESMOND Haynes CNM   Method of Notification In Department   Request Evaluate - Remote   Notification Reason Labor Status   Provider updated on EFM and ctx intensity. No new orders at this time, continue to monitor.

## 2019-12-27 NOTE — PROGRESS NOTES
[facility] Labor and Delivery Progress Note    Sagrario Meyer MRN# [id number]   Age: 39 year old YOB: 1980         Subjective:   Sagrario is lying in bed and her  Earlene is sleeping in the chair next to her.            Objective:     Patient Vitals for the past 24 hrs:   BP Temp Temp src Resp Oximeter Heart Rate   19 0730 124/76 98.3  F (36.8  C) Oral 16 99 bpm   19 0310 119/70 98.5  F (36.9  C) Oral 16 77 bpm   19 2357 109/53 98.4  F (36.9  C) Oral 16 93 bpm   19 1919 124/73 98.7  F (37.1  C) Oral 18 --   19 1610 123/76 -- -- 18 83 bpm   19 1553 (!) 140/77 98.3  F (36.8  C) Oral 18 80 bpm       Cervical Exam: deferred    Membranes: Intact     Fetal Heart Rate:    Monitor: external US    Variability: minimal (detectable, amplitude less than or equal to 5 bpm)    Baseline Rate: normal range 135 with acceleration and no   decelerations.   Fetal Heart Rate Tracing: cat II     Contractions: q2-4 minutes, lasting 60 seconds, mild to palpation with relaxed resting tone.     Miso: 3rd dose of miso given         Assessment:   Sagrario Meyer is a 39 year old  who is 39w4d here with IOL for polyhydramnios BOBO of 26.4.        Plan:   -Continue cervical ripening with misoprostol  -Reviewed option to add law bulb for IOL, natalya picture as she was not understanding this concept -  woke up and endorsed law as option. We will revisit later in the day.  - She plans to eat breakfast and get up - will revisit plan around noon.   -Continue to encourage upright positioning and ambulation      Rachel Solis

## 2019-12-27 NOTE — PROGRESS NOTES
Blood pressure 124/73, temperature 98.7  F (37.1  C), temperature source Oral, resp. rate 18, last menstrual period 03/25/2019, not currently breastfeeding.  Patient Vitals for the past 24 hrs:   BP Temp Temp src Resp   12/26/19 1919 124/73 98.7  F (37.1  C) Oral 18   12/26/19 1610 123/76 -- -- 18   12/26/19 1553 (!) 140/77 98.3  F (36.8  C) Oral 18     General appearance: comfortable  Mild cramping. Had been up walking. Now contractions every two minutes with minimal variability. Second dose of vaginal misoprostol not given  CONTACTIONS: mild, cramping and every 2 minutes  Pitocin- none,  Antibiotics- none  FETAL HEART TONES: continuous EFM- baseline 140 with minimal variability and no decelerations.  ROM: not ruptured  PELVIC EXAM:deferred  # Pain Assessment:  Current Pain Score 12/26/2019   Patient currently in pain? yes   Sagrario s pain level was assessed and she currently denies pain.      ASSESSMENT:  ==============  IUP @ 39w3d for induction of labor.  Indication: AMA at term and polyhydramnios   Uterine fibroid  Fetal Heart Rate Tracing category two  GBS- negative  Patient Active Problem List   Diagnosis     Cystic acne     Latent tuberculosis     Redness of right eye     Chronic right-sided low back pain without sciatica     Vitamin D deficiency     AMA (advanced maternal age) multigravida 35+     Subserous leiomyoma of uterus     Supervision of high risk pregnancy BILLIE SHIPLEY care     Multigravida of advanced maternal age in third trimester     Labor and delivery, indication for care     PLAN:  ===========  Close observation   IV bolus now  Reassess when contractions space out.  ASHLEY BergeronM

## 2019-12-27 NOTE — PROGRESS NOTES
Blood pressure 109/53, temperature 98.4  F (36.9  C), temperature source Oral, resp. rate 16, last menstrual period 03/25/2019, not currently breastfeeding.  Patient Vitals for the past 24 hrs:   BP Temp Temp src Resp   12/26/19 2357 109/53 98.4  F (36.9  C) Oral 16   12/26/19 1919 124/73 98.7  F (37.1  C) Oral 18   12/26/19 1610 123/76 -- -- 18   12/26/19 1553 (!) 140/77 98.3  F (36.8  C) Oral 18     General appearance: comfortable  Awake visiting with a friend. Not feeling most contractions.  Had 500cc IV bolus. Contractions continue q1-3 and palpate mild. Has not had second dose of misoprostol  CONTACTIONS: mild, occas cramping and every 1-3 minutes  Pitocin- none,  Antibiotics- none  FETAL HEART TONES: continuous EFM- baseline 130 with moderate variability and no decelerations. Periods of minimal variability  ROM: not ruptured  PELVIC EXAM:deferred  # Pain Assessment:  Current Pain Score 12/26/2019   Patient currently in pain? beatriz Zabala s pain level was assessed and she currently denies pain.      ASSESSMENT:  ==============  IUP @ 39w4d for induction of labor.  Indication: AMA at term and polyhydramnios   Uterine myoma  Fetal Heart Rate Tracing category one  GBS- negative  Patient Active Problem List   Diagnosis     Cystic acne     Latent tuberculosis     Redness of right eye     Chronic right-sided low back pain without sciatica     Vitamin D deficiency     AMA (advanced maternal age) multigravida 35+     Subserous leiomyoma of uterus     Supervision of high risk pregnancy BILLIE SHIPLEY care     Multigravida of advanced maternal age in third trimester     Labor and delivery, indication for care     PLAN:  ===========  Continue observation and repeat misoprostol if contractions space out   Encourage rest  ASHLEY Bergeron CNM     HPI:  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 with chief complaint of Knee Pain  Patient presents with knee pain involving both knees  ROS:   General: No fever, no chills, POSITIVE weight loss, no weight gain  HEENT:  No loss of hearing, no nose bleeds, no sore throat  Eyes:  No eye pain, no red eyes, no visual disturbance  Respiratory:  No cough, no shortness of breath, no wheezing  Cardiovascular:  No chest pain, no palpitations, no edema  GI: No abdominal pain, no nausea, no vomiting  Endocrine: No frequent urination, POSITIVE  excessive thirst  Urinary:  No dysuria, no hematuria, no incontinence  Musculoskeletal: see HPI   Skin:  No rash, no wounds  Neurological:  No dizziness, no headache, no numbness  Psychiatric:  No difficulty concentrating, no depression, no suicide thoughts, no anxiety  Review of all other systems is negative    PMH:  Past Medical History:   Diagnosis Date    Sutherland's esophagus     Elevated liver function tests     GERD (gastroesophageal reflux disease)     Helicobacter pylori (H  pylori) infection     last assessed - 27Oct2016    Hypertension     Lyme disease     last assessed - 25Jul2012       PSH:  Past Surgical History:   Procedure Laterality Date    BONY PELVIS SURGERY      HERNIA REPAIR      [place] ESOPHAGOGASTRODUODENOSCOPY TRANSORAL DIAGNOSTIC N/A 10/18/2017    Procedure: ESOPHAGOGASTRODUODENOSCOPY (EGD); Surgeon: Osa Eisenmenger, MD;  Location: [facility];   Service: Gastroenterology       Medications:  Current Outpatient Prescriptions   Medication Sig Dispense Refill    Apple Cider Vinegar 300 MG TABS Take by mouth      Ascorbic Acid (VITAMIN C) 100 MG tablet Take 100 mg by mouth daily      Calcium Carb-Cholecalciferol (CALCIUM 1000 + D) 1000-800 MG-UNIT TABS Take by mouth      Cholecalciferol (CVS D3) 5000 units capsule Take 1 capsule by mouth daily      coenzyme Q-10 100 MG capsule Take by mouth      CREATINE PO Take by mouth      cyanocobalamin (VITAMIN B-12) 1,000 mcg tablet Take by mouth daily      Flaxseed, Linseed, (FLAX SEED OIL PO) Take by mouth      GARLIC 6478 PO Take by mouth      Glucosamine-Chondroitin 1583-9125 MG/30ML LIQD Take by mouth      Linoleic Acid-Sunflower Oil (CLA) 500-1000 MG CAPS Take by mouth      Magnesium Gluconate (MAGNESIUM 27 PO) Take by mouth      Milk Thistle 1000 MG CAPS Take by mouth      multivitamin (THERAGRAN) TABS Take 1 tablet by mouth daily      Nutritional Supplements (CREATINE) 750 MG CAPS Take by mouth      olmesartan-hydrochlorothiazide (BENICAR HCT) 20-12 5 MG per tablet TAKE 1 TABLET DAILY  90 tablet 0    progesterone (PROMETRIUM) 100 MG capsule Take 100 mg by mouth daily      pyridoxine (VITAMIN B6) 100 mg tablet Take by mouth      Selenium 200 MCG CAPS Take by mouth      sildenafil (REVATIO) 20 mg tablet Take 20 mg by mouth 3 (three) times a day      testosterone (ANDROGEL PUMP) 1 62 % TD gel pump Apply 1 actuation (20 25 mg total) topically daily 2 actuation 5    verapamil (CALAN-SR) 180 mg CR tablet Take 1 tablet by mouth daily      vitamin E, tocopherol, 400 units capsule Take by mouth      Zinc 30 MG CAPS Take by mouth       No current facility-administered medications for this visit  Allergies:  No Known Allergies    Family History:  Family History   Problem Relation Age of Onset    Hypotension Mother     Stroke Father     Intracerebral hemorrhage Father      Cerebral hemorrhage       Social History:  Social History     Occupational History    Not on file  Social History Main Topics    Smoking status: Never Smoker    Smokeless tobacco: Never Used    Alcohol use No    Drug use: No    Sexual activity: Not on file       Physical Exam:  General :  Alert, cooperative, no distress, appears stated age  Blood pressure (!) 169/114, pulse 73, height 5' 6" (1 676 m), weight 99 2 kg (218 lb 12 8 oz)     Head:  Normocephalic, without obvious abnormality, atraumatic Eyes:  Conjunctiva/corneas clear, EOM's intact,   Ears: Both ears normal appearance, no hearing deficits  Nose: Nares normal, septum midline, no drainage    Neck: Supple,  trachea midline, no adenopathy, no tenderness, no mass   Back:   Symmetric, no curvature, ROM normal, no tenderness   Lungs:   Respirations unlabored   Chest Wall:  No tenderness or deformity   Extremities: Extremities normal, atraumatic, no cyanosis or edema      Pulses: 2+ and symmetric   Skin: Skin color, texture, turgor normal, no rashes or lesions      Neurologic: Normal           Ortho Exam    Imaging Studies: The following imaging studies were reviewed in office today  My findings are noted  Assessment  Encounter Diagnosis   Name Primary?  Pain in both knees, unspecified chronicity          Plan: This encounter was created in error - please disregard

## 2019-12-27 NOTE — PLAN OF CARE
Labor Shift Note  Data: EFM see flowsheet.  Vitals:    19 1610 19 1919 19 2357 19 0310   BP: 123/76 124/73 109/53 119/70   Resp: 18 18 16 16   Temp:  98.7  F (37.1  C) 98.4  F (36.9  C) 98.5  F (36.9  C)   TempSrc:  Oral Oral Oral   . No intake/output data recorded..  Leaking none.  Signs and symptoms of infection absent.  Blood pressures WDL. Signs and symptoms of pre-eclampsia: absent.  Support person present.  Interventions: Continue uterine/fetal assessment continuous. Vital Signs per order set. Comfort measures therapeutic rest encouraged.  Medicated for none.  Plan: Anticipate . Provide labor/coping assistance as needed by patient and support person.  Observe for and notify care provider of indications of progressing labor, need for pain medications,  or signs of fetal/maternal compromise.

## 2019-12-28 PROCEDURE — 25000132 ZZH RX MED GY IP 250 OP 250 PS 637: Performed by: ADVANCED PRACTICE MIDWIFE

## 2019-12-28 PROCEDURE — 12000001 ZZH R&B MED SURG/OB UMMC

## 2019-12-28 PROCEDURE — 25800030 ZZH RX IP 258 OP 636: Performed by: ADVANCED PRACTICE MIDWIFE

## 2019-12-28 PROCEDURE — 3E0P7VZ INTRODUCTION OF HORMONE INTO FEMALE REPRODUCTIVE, VIA NATURAL OR ARTIFICIAL OPENING: ICD-10-PCS | Performed by: ADVANCED PRACTICE MIDWIFE

## 2019-12-28 RX ORDER — LIDOCAINE HYDROCHLORIDE 20 MG/ML
JELLY TOPICAL EVERY 4 HOURS PRN
Status: DISCONTINUED | OUTPATIENT
Start: 2019-12-28 | End: 2019-12-31

## 2019-12-28 RX ORDER — MISOPROSTOL 100 UG/1
25 TABLET ORAL
Status: DISCONTINUED | OUTPATIENT
Start: 2019-12-28 | End: 2019-12-29

## 2019-12-28 RX ADMIN — Medication 25 MCG: at 01:21

## 2019-12-28 RX ADMIN — DINOPROSTONE 10 MG: 10 INSERT VAGINAL at 12:14

## 2019-12-28 RX ADMIN — SODIUM CHLORIDE, POTASSIUM CHLORIDE, SODIUM LACTATE AND CALCIUM CHLORIDE 250 ML: 600; 310; 30; 20 INJECTION, SOLUTION INTRAVENOUS at 13:35

## 2019-12-28 NOTE — PROVIDER NOTIFICATION
12/28/19 0549   Provider Notification   Provider Name/Title [place] LINDA   Method of Notification Phone   Notification Reason Labor Status   Bolus infused, oxygen d/c'd, CAT I strip Per CNM ok to give misoprostol or continue to monitor- adjusted tocometer, patient garcía every 2.5-4 mins, will continue to monitor and give misoprostol if FHR remains cat I and contraction pattern allows  Megan Armendariz RN on 12/28/2019 at 5:52 AM

## 2019-12-28 NOTE — PROGRESS NOTES
Provider notification:  Provider Name: Jamey  LINDA. Notified at 0310 regarding a persistent category II fetal heart rate tracing for 30 minutes.   Baseline rate 140, normal  Variability minimal  Accelerations not present  Decelerations present, deceleration type: late , deceleration frequency: intermittent    EFM interpretation suggests concern for fetal metabolic acidemia at this time due to minimal variability and absence of accelerations    Uterine Activity normal/regular.    Interventions to improve fetal oxygenation for a category II tracing include:maternal positioning, IV fluid bolus , maternal oxygen mask, consult Category 2 algorithm and emotional support    After discussion with provider:Plan reassessment in 20 minutes    Addendum: maternal position changes, IV fluid bolus, and oxygen given, huddle completed with LINDA Concepcion, per CNM hold vag miso dose at 0520 and continue with current POC, mod. Variability, and accels noted post interventions, int. lates continuing, LINDA reviewed tracing  Megan Armendariz RN on 12/28/2019 at 4:09 AM

## 2019-12-28 NOTE — PROGRESS NOTES
Labor progress note    S: Pt with her friend at bedside, supportive.  In good spirits overall but definitely ready to meet her baby.   Was using the Aretha monitor but issues with dropped tracing, now transitioning to EFM/TOCO with belly band.  Will get telemetry to walk.  Is agreeable to discuss plan of care for overnight.      O:  Blood pressure 116/66, pulse 97, temperature 98.3  F (36.8  C), temperature source Oral, resp. rate 18, last menstrual period 2019, not currently breastfeeding.  General appearance: comfortable.  Contractions: Every 2-5 minutes. 60-80 seconds duration.  Palpate: mild and uterine irritability.  Soft resting tone.   FHT: Baseline 145 with moderate variability. Accelerations are present. no decelerations present.  +periods of loss of contact d/t monitors, RN team readjusting.   ROM: not ruptured.   PELVIC EXAM: deferred  Treviño Score: Total: 3-4/13 based on most recent SVE    Pitocin- none,  Antibiotics- none  IV saline lock  Vaginal Miso #1 2619 at 1755  PO Miso #1 19 at 0523, #2 0746, #3 1058, #4 1338, #5 1550, #6 1821    # Pain Assessment:    - Sagrario is experiencing pain due to cervical ripening. Pain management was discussed with Sagrario and her friend and the plan was created in a collaborative fashion.  Sagrario's response to the current recommendations: engaged  - At present coping well with cramping, declines to discuss any pharmacologic options.         A:  39 year old  with IUP @ 39w4d IOL for AMA  Polyhydramnios - last BOBO 26.5 on 12/23/10  Large Retroplacental Fibroid, cleared for IOL and vaginal delivery by M  Treviño score <8, primip.   Fetal Heart Rate Tracing category one  GBS- negative    Patient Active Problem List   Diagnosis     Cystic acne     Latent tuberculosis     Redness of right eye     Chronic right-sided low back pain without sciatica     Vitamin D deficiency     AMA (advanced maternal age) multigravida 35+     Subserous leiomyoma of uterus      Supervision of high risk pregnancy BILLIE SHIPLEY care     Multigravida of advanced maternal age in third trimester     Labor and delivery, indication for care         P:  - Cervical ripening options of Misoprostol oral orvaginal, cervidil and law bulb reviewed with pt.   Reviewed dosing regimens of each.  Reviewed need for SVE with and technique for placement of law balloon.  Reviewed recent evidence says Vaginal Miso and Law balloon best option to get closer to vaginal delivery.   At this time pt prefers to have Vaginal Misoprostol q4 hours  x3 over night to get to ~24 hours of Misoprostol since PO dosing was started 0523 this morning after no dosing overnight.  Reviewed vaginal misoprostol placement with  so no SVE required. Can use Nitrous Oxide and supports prn with placement.  Reviewed importance that if unable to place dose of misoprostol due to fetal or maternal factors will need to reassess plan which would include recommendation for SVE.  Agreeable to plan.  Reviewed plan with bedside RN - reviewed pt's discomfort with SVE and plan to use Nitrous Oxide and pt consented placement with .  RN will updated CNM if pt's prefers CNM to place or has any concerns.       -Ambulation, hydration, position changes, birthing ball/sling and tub options to facilitate labor.  Encouraged rest tonight if able .  Reviewed pt's fear that if she sleeps she will not cramp/contract and it will take longer to deliver.  Reinforced need for rest to ensure functional labor.  Cervical ripening agents not influence by sleep aids - will continue to work despite her getting sleep.  She has lavender oil/spray and plans to use telemetry to take a walk to help feel tired.  Is aware that there are pharmacologic options available to help rest but pt declines to discuss.  Will ask RN team if she desires any.  Bedside RN updated that Vistaril and IM Morphine are ordered - pt may have as ordered prn. Pt aware CNM plans to allow pt  to rest and will not come to disrupt her unless clinically indicated, pt agreeable.     - Reviewed with pt increased risk of hemorrhage after delivery d/t large fibroid.  Reviewed recommendation for active management of third stage of labor (AMTSL) including IV pitocin per postpartum protocol and Buccal Misoprostol 400mcg immediately after delivery of baby.  Reviewed IM Methergine, IM Hemabate, IV TXA prn if heavy bleeding after initial uterotonics.  Pt at this time would decline blood transfusion but is very agreeable to AMTSL and interventions to control bleeding immediately postpartum.      - Pt's friend going home but will be joined by another friend overnight. Reinforced to pt that she has ownership of her body/the baby and her choices during her cares.  If she has any questions or concerns may have midwife paged at any time.  Pt agreeable.     - Plan tomorrow to reassess SVE with pt consent after 24 hours of Miso dosing 12/2819~ 0830 (or 4 hours after last Vaginal Miso dose based on timing).  Again would recommend law balloon if pt consents at that time if continued cervical ripening is required.     - Reevaluate in 2-4 hours and prn    Isamar RAMIREZ CNM

## 2019-12-28 NOTE — PROGRESS NOTES
[facility] Labor and Delivery Progress Note    Sagrario Meyer MRN# [id number]   Age: 39 year old YOB: 1980         Subjective:   Sagrario is still very comfortable with her contractions. The cervidil didn't come from pharmacy for well over an hour and when the RN attempted to place it Sagrario was unable to tolerate the placement. CNM was called to place the cervidil.            Objective:     Patient Vitals for the past 24 hrs:   BP Temp Temp src Pulse Resp Oximeter Heart Rate   19 0900 126/66 98  F (36.7  C) Oral -- 20 --   19 0530 -- -- -- -- -- 81 bpm   19 0528 121/60 98.4  F (36.9  C) Oral -- 16 --   19 0030 115/67 -- -- -- -- --   19 0026 -- 98.1  F (36.7  C) Oral 75 16 --   19 1937 127/61 98.8  F (37.1  C) Oral 86 16 --   19 1820 116/66 -- -- -- -- 80 bpm   19 1530 127/67 -- -- -- 18 79 bpm   19 1330 120/67 -- -- 97 18 --       Cervical Exam: deferred    Membranes: Intact     Fetal Heart Rate:    Monitor: external US    Variability: moderate (amplitude range 6 to 25 bpm)    Baseline Rate: normal range 150   Fetal Heart Rate Tracing: cat I     Contractions: q4-6 minutes,  seconds, mild to palpation.     Cervidil placed at 1215        Assessment:   Sagrario Meyer is a 39 year old  who is 39w5d here with IOL for AMA and polyhydramnios with large uterine fibroid.         Plan:   We discussed plan for the day again, she has questions about c/section and when it would be recommended.   We discussed that an epidural may be a therapeutic option once in active labor, she will consider this and feels informed of her options.   1215 Cervidil placed.     Rachel Solis

## 2019-12-28 NOTE — PROGRESS NOTES
Labor progress note    S: Pt declined any pharmacologic option to help sleep has been dozing on/off per RN report.  I was notified by labor RN of persistent category II fetal heart rate tracing for 20 minutes for which IV fluid bolus and maternal position change had bene started. I came to the the unit to review with the RN.     O:  Blood pressure 127/61, pulse 86, temperature 98.8  F (37.1  C), temperature source Oral, resp. rate 16, last menstrual period 2019, not currently breastfeeding.  General appearance: comfortable overall.  Contractions: Every 3-5 minutes.  seconds duration.  Palpate: mild.  Soft resting tone.   FHT: Baseline 135 with period of minimal variability that resolved to moderate variability with position change. Accelerations are present. + recurrent decelerations present that resolved with maternal position change  ROM: not ruptured.  PELVIC EXAM:deferred  Treviño Score:3-4/13 with last SVE    Pitocin- none,  Antibiotics- none   Now with 250ml IV fluid bolus, will be saline locked again after.   Vaginal Miso #1 given 19 at 1755  Last PO Miso (`#6 PO, #7 total) given 19 at 1821.    Vaginal Miso #2 given (#2 vaginal, #8 total) given 19 at .   Due to consider next vaginal Miso at 19 at ~ 0030           A:  39 year old  with IUP @ 39w4d IOL AMA >/=41yo by RIO   Mild Polyhydramnios in 3rd trimester   Retroplacental fibroid - cleared for IOL and vaginal delivery by MFM  Difficulty with vaginal exams  Fetal Heart Rate Tracing category two - now moderate variability and +Accels.    Assessment: EFM interpretation suggests absence of concern for fetal metabolic acidemia at this time due to moderate variability and accelerations present  GBS- negative    Patient Active Problem List   Diagnosis     Cystic acne     Latent tuberculosis     Redness of right eye     Chronic right-sided low back pain without sciatica     Vitamin D deficiency     AMA (advanced  maternal age) multigravida 35+     Subserous leiomyoma of uterus     Supervision of high risk pregnancy BILLIE SHIPLEY care     Multigravida of advanced maternal age in third trimester     Labor and delivery, indication for care         P:    Continue cervical ripening with Misoprostol vaginal as discussed at 0030 if continued category 1 FHR tracing and less than 5 CTX/10 minutes.  Reviewed with bedside RN plan. RN will page CNM to review FHR tracing or to discuss alternative plans prn if not meeting criteria for placement or any concerns.   Reevaluate in 2-4 hours and prn    Isamar RAMIREZ, LINDA              ADDENDUM 12/28/2019 12:54 AM - CNM paged by bedside RN to discuss plan of care. Category 1 FHR tracing per RN but TOCO repositioned and now showing CTS4-5/10minutes.  Pt having some vaginal discomfort with contractions that sounds c/w fetal head pressure as it resolves between contractions. RN will offer pharmacologic options if pt is willing to help rest.  RN will page CNM by 0200 if unable to give next dose of Miso or prn if any changes in maternal/fetal status. ASHLEY Brooks CNM

## 2019-12-28 NOTE — PLAN OF CARE
Pt reports cramping but unchanged in intensity all day. 6th miso oral given at 1820. Pt is trying to walk but monitoring is difficult when up walking. Aretha and regular monitors are difficult to trace when up walking. Standing, birthing ball and resting as well.   Vitals WNL  FHT: 135-140 moderate variability with accels   [place]: 2-4.5 mild with cramps.   Will continue to monitor.

## 2019-12-28 NOTE — PROVIDER NOTIFICATION
12/28/19 0430   Provider Notification   Provider Name/Title sirisha joss   Method of Notification Electronic Page   Request Evaluate - Remote   Notification Reason Variability Change;Decels   minimal varb. And lates persisting, continuing interventions ,please come to review in unit  Megan Armendariz RN on 12/28/2019 at 5:07 AM       CAT II huddle completed, Plan per CNM continue to monitor and re notify if mini. Varb. And lates persist  Megan Armendariz RN on 12/28/2019 at 5:13 AM

## 2019-12-28 NOTE — PLAN OF CARE
D: Patient currently has a cervidil in place and is starting to feel cramping. Walking around room and visiting with friends. Fetal heart tones minimal to moderate variability. P: Continue to monitor.

## 2019-12-28 NOTE — PROVIDER NOTIFICATION
12/28/19 1330   Provider Notification   Provider Name/Title Prema Irma MCKEON   Method of Notification Electronic Page   Notification Reason Fetal Baseline Change   D: Fetal heart rate change from 145 to 165's. Have changed patient position and can hear baby is very active on the monitor and patient states baby is very active. Will do a 250cc bolus and reassess. P: Continue to monitor.

## 2019-12-28 NOTE — PROGRESS NOTES
[facility] Labor and Delivery Progress Note    Sagrario Meyer MRN# [id number]   Age: 39 year old YOB: 1980         Subjective:   Sagrario is still comfortable this morning, we visited at 0830 and then again at 1000. Her 0600 dose of miso was held by RN because of increased uterine contractions. She is amenable to an SVE this morning and a law placement if cervix is open.            Objective:     Patient Vitals for the past 24 hrs:   BP Temp Temp src Pulse Resp Oximeter Heart Rate   19 0900 126/66 98  F (36.7  C) Oral -- 20 --   19 0530 -- -- -- -- -- 81 bpm   19 0528 121/60 98.4  F (36.9  C) Oral -- 16 --   19 0030 115/67 -- -- -- -- --   19 0026 -- 98.1  F (36.7  C) Oral 75 16 --   19 1937 127/61 98.8  F (37.1  C) Oral 86 16 --   19 1820 116/66 -- -- -- -- 80 bpm   19 1530 127/67 -- -- -- 18 79 bpm   19 1330 120/67 -- -- 97 18 --   19 1125 121/72 -- -- -- 18 78 bpm   19 1100 112/68 98.3  F (36.8  C) Oral 89 16 --       Cervical Exam: 0 / 60% / -2 fingertip in external os, internal os still closed.     Position: Posterior    Membranes: Intact     Fetal Heart Rate:    Monitor: external US    Variability: moderate (amplitude range 6 to 25 bpm)    Baseline Rate: normal range    Fetal Heart Rate Tracing: Cat I    Contractions: q3-4 minutes lasting 60 seconds mild to palpation.     0550 dose of miso not given. No ripening agents since 0130          Assessment:   Sagrario Meyer is a 39 year old  who is 39w5d here with IOL for AMA and polyhydramnios with uterine fibroids.         Plan:   -Offered to attempt law bulb insertion with a speculum and a ring forcept but disclosed that it is a 50/50 if placement would be possible. Sagrario has been using nitrous for exams because the are very difficult for her so she prefers Cervidil vs law placement.   -Cervical ripening with cervidil.  -Reviewed her increased risk for a PPH given IOL  and her large uterine fibroid. Planning on AMSTL (plan on using both IV pitocin and miso) and pt is accepting of blood transfusion if it is life-saving.   -Plan to get up and ambulate after placement of cervidil.       Rachel Solis

## 2019-12-28 NOTE — PLAN OF CARE
Afebrile, VSS, patient endorsing vaginal and lower abdomen pain associated with contractions, patient declining any pharmacological interventions, using repositioning and frequent ambulation to help with pain at start of shift but able to sleep through contractions, patient denying any LOF or vaginal bleeding, friend at bedside to for labor support, see cat II note and flowsheet for details on FHR tracing, cat I and cat II throughout night, contractions difficult to monitor using tocometer d/t large fibroid, Aretha and external monitoring utilized, toco repositioned frequently and contractions remain inverted, CNM aware and ok with tracing quality, per CNM hold vag miso and provider will readdress POC in AM, patient amenable to POC  Megan Armendariz RN on 12/28/2019 at 4:33 AM

## 2019-12-28 NOTE — PROGRESS NOTES
S: I was notified by labor RN of intermittent category II fetal heart rate tracing for past ~60 minutes.  Strip reviewed remotely via [employer] with RN on phone.   RN reports pt having no discomfort other than needed to have IV replaced.  Pt is dozing on/off per RN report.     O: Baseline rate 140, normal  Variability moderate with previous periods of minimal  Accelerations present  Decelerations present, deceleration type: late , deceleration frequency: intermittent - none at present    Vaginal Miso given at 0121  250ml IV fluid bolus in process    Assessment: EFM interpretation suggests absence of concern for fetal metabolic acidemia at this time due to moderate variability and accelerations present    Uterine Activity normal/regular.    The interventions currently taken to improve the fetal heart rate tracing and fetal oxygenation are: maternal positioning, IV fluid bolus  and consult Category 2 algorithm    A: Periods of category II tracing - currently category 1 FHR tracing  IOL for AMA 38yo and Polyhydramnios  Retroplacental Fibroid  GBS neg    P: Per the category II algorithm, the plan is to continue observe/conservative management. Reevaluate in 60 minutes and prn.  Reviewed with bedside RN over the phone and is in agreement with the plan.  Should any changes occur to maternal or fetal status RN will page CNM to review tracing.  At this point will hold next dose of Vaginal Misoprostol due ~0530 unless continued Category 1 FHR tracing.   RN in agreement.  ASHLEY Brooks CNM      ADDENDUM 12/28/2019 5:01 AM  - Called to unit by RN to review FHR tracing.  Noted period of minimal variability since ~0435 with intermittent and variable decels.   Reviewed Category 2 FHR algorithm.  Will continue position change, 02 on, IV fluid bolus now.  Reassess in 30-60 minutes.  If no change will recommend SVE to assess plan of care.  Pt was just moved to her R side per RN report, will reposition in next ~10-15 minutes  if no change in FHR tracing.  ASHLEY Brooks CNM      ADDENDUM 12/28/2019 5:26 AM  - Noted Category 1 FHR tracing with moderate variability and + Accels.  Reviewed with bedside RN that based on TOCO and FHR tracing, appropriate for next dose of Misoprostol.  Bedside RN reports that pt would prefer to transition back to PO Misoprostol dosing.  Vaginal dosing discontinued and PO dosing put in for 2 additional doses.  Will again have day shift follow up with assessment of SVE for plan of care.   RN in agreement - will notify CNM prn any changes in maternal/fetal status.  ASHLEY Brooks CNM

## 2019-12-28 NOTE — PROVIDER NOTIFICATION
12/28/19 0830   Provider Notification   Provider Name/Title Prema Solis CNM   Method of Notification At Bedside   D: Fetal heart tracing reviewed at the bedside and will recheck her cervix and determine the plan going forward. P: Continue to monitor.

## 2019-12-28 NOTE — PROGRESS NOTES
[facility] Labor and Delivery Progress Note    Sagrario Meyer MRN# [id number]   Age: 39 year old YOB: 1980         Subjective:   Sagrario has been up walking, eating meals and resting when needed. She reports occasionally feeling pain down by her pubic bone.            Objective:     Patient Vitals for the past 24 hrs:   BP Temp Temp src Pulse Resp Oximeter Heart Rate   19 1539 129/73 97.8  F (36.6  C) Oral -- 16 103 bpm   19 1300 114/55 97.7  F (36.5  C) Oral -- 20 81 bpm   19 0900 126/66 98  F (36.7  C) Oral -- 20 --   19 0530 -- -- -- -- -- 81 bpm   19 0528 121/60 98.4  F (36.9  C) Oral -- 16 --   19 0030 115/67 -- -- -- -- --   19 0026 -- 98.1  F (36.7  C) Oral 75 16 --   19 1937 127/61 98.8  F (37.1  C) Oral 86 16 --   19 1820 116/66 -- -- -- -- 80 bpm       Cervical Exam: deferred    Membranes: Intact     Fetal Heart Rate:    Monitor: external US    Variability: moderate (amplitude range 6 to 25 bpm) accelerations without decels   Baseline Rate: normal range 140    Fetal Heart Rate Tracing: cat    Contractions: external every 3 minutes, 60-80 seconds, moderate to palpation.    Cervidil still in place.         Assessment:   Sagrario Meyer is a 39 year old  who is 39w5d here with IOL for AMA and polyhydramnios and with a large uterine fibroid.           Plan:   -Continue with cervidil until 12 hours or prn.   -Continue to encourage upright positioning, reviewed sleep medication.   -Reviewed plan for Jamey to remove cervidil at 0015 and to reassess at that time for overnight plan.        Rachel Solis

## 2019-12-29 PROCEDURE — 25000128 H RX IP 250 OP 636: Performed by: ADVANCED PRACTICE MIDWIFE

## 2019-12-29 PROCEDURE — 86900 BLOOD TYPING SEROLOGIC ABO: CPT | Performed by: ADVANCED PRACTICE MIDWIFE

## 2019-12-29 PROCEDURE — 12000001 ZZH R&B MED SURG/OB UMMC

## 2019-12-29 PROCEDURE — 86923 COMPATIBILITY TEST ELECTRIC: CPT | Performed by: ADVANCED PRACTICE MIDWIFE

## 2019-12-29 PROCEDURE — 25800030 ZZH RX IP 258 OP 636: Performed by: ADVANCED PRACTICE MIDWIFE

## 2019-12-29 PROCEDURE — 36415 COLL VENOUS BLD VENIPUNCTURE: CPT | Performed by: ADVANCED PRACTICE MIDWIFE

## 2019-12-29 PROCEDURE — 3E033VJ INTRODUCTION OF OTHER HORMONE INTO PERIPHERAL VEIN, PERCUTANEOUS APPROACH: ICD-10-PCS | Performed by: ADVANCED PRACTICE MIDWIFE

## 2019-12-29 PROCEDURE — 86901 BLOOD TYPING SEROLOGIC RH(D): CPT | Performed by: ADVANCED PRACTICE MIDWIFE

## 2019-12-29 PROCEDURE — 25000125 ZZHC RX 250: Performed by: ADVANCED PRACTICE MIDWIFE

## 2019-12-29 PROCEDURE — 86850 RBC ANTIBODY SCREEN: CPT | Performed by: ADVANCED PRACTICE MIDWIFE

## 2019-12-29 RX ORDER — SODIUM CHLORIDE, SODIUM LACTATE, POTASSIUM CHLORIDE, CALCIUM CHLORIDE 600; 310; 30; 20 MG/100ML; MG/100ML; MG/100ML; MG/100ML
INJECTION, SOLUTION INTRAVENOUS CONTINUOUS
Status: DISCONTINUED | OUTPATIENT
Start: 2019-12-29 | End: 2019-12-31

## 2019-12-29 RX ORDER — OXYTOCIN/0.9 % SODIUM CHLORIDE 30/500 ML
1-24 PLASTIC BAG, INJECTION (ML) INTRAVENOUS CONTINUOUS
Status: DISCONTINUED | OUTPATIENT
Start: 2019-12-29 | End: 2019-12-31

## 2019-12-29 RX ADMIN — LIDOCAINE HYDROCHLORIDE: 20 JELLY TOPICAL at 00:23

## 2019-12-29 RX ADMIN — SODIUM CHLORIDE, POTASSIUM CHLORIDE, SODIUM LACTATE AND CALCIUM CHLORIDE: 600; 310; 30; 20 INJECTION, SOLUTION INTRAVENOUS at 17:51

## 2019-12-29 RX ADMIN — FENTANYL CITRATE 100 MCG: 50 INJECTION, SOLUTION INTRAMUSCULAR; INTRAVENOUS at 01:23

## 2019-12-29 RX ADMIN — Medication 2 MILLI-UNITS/MIN: at 06:22

## 2019-12-29 RX ADMIN — SODIUM CHLORIDE, POTASSIUM CHLORIDE, SODIUM LACTATE AND CALCIUM CHLORIDE: 600; 310; 30; 20 INJECTION, SOLUTION INTRAVENOUS at 09:44

## 2019-12-29 NOTE — PROGRESS NOTES
Labor progress note    S: Pt noted resting in R lateral, using heat packs for back discomfort with contractions. Has multiple female supports at bedside.      O:  Blood pressure 129/73, pulse 75, temperature 97.8  F (36.6  C), temperature source Oral, resp. rate 16, last menstrual period 2019, not currently breastfeeding.  General appearance: uncomfortable with contractions but coping well.  Contractions: Every 2-4 minutes.  seconds duration.  Palpate: mild and moderate.  Soft resting tone.   FHT: Baseline 140 with moderate variability with periods of minimal. Accelerations are present. no decelerations present.  ROM: not ruptured.   PELVIC EXAM:deferred  Treviño Score: 3-4/13 with last exam    Pitocin- none,  Antibiotics- none  IV saline lock  Cervidil in place since 2019 at 1215 `        # Pain Assessment:    - Sagrario is experiencing pain due to cervical ripening. Pain management was discussed with Sagrario and her friends and the plan was created in a collaborative fashion.  Sagrario's response to the current recommendations: engaged  - Declines pharmacologic options, coping well with positioning and heat packs.         A:  39 year old  with IUP @ 39w5d IOL for AMA 40yo at RIO  Polyhydramnios  Retroplacental uterine fibroid  Fetal Heart Rate Tracing category two.  +Accels, moderate variability   GBS- negative    Patient Active Problem List   Diagnosis     Cystic acne     Latent tuberculosis     Redness of right eye     Chronic right-sided low back pain without sciatica     Vitamin D deficiency     AMA (advanced maternal age) multigravida 35+     Subserous leiomyoma of uterus     Supervision of high risk pregnancy [facility] SHIPLEY care     Multigravida of advanced maternal age in third trimester     Labor and delivery, indication for care         P:  Ambulation, hydration, position changes, birthing ball/sling and tub options to facilitate labor. Encouraged rest when able.  Pt declines therapeutic rest  medication.   Pain management options reviewed with pt. Pt is interested in continued non pharmacologic options at present.  Declines to discuss additional options at this time.   - Continue cervical ripening with Cervidil until ~0015.  Reviewed with pt will remove Cervidil and then she will have to decide between recommendation to have SVE for plan of care discussion. Reviewed options could include to have law balloon placed if continued cervical ripening is recommended or watch/wait if she continues to contract uncomfortably.  At this time pt is undecided, will consider and discuss more at SVE. Bedside RN present during plan of care discussion.   - Consider topical lidocaine 5-10 minutes before vaginal exam.   - Reviewed with pt T&S expires 12/29/19 at~1600.  Recommend having T&S redrawn tomorrow ~0600 or earlier prn.  Pt in agreement.   -   Reevaluate in 2-4 hours and prn      Isamar RAMIREZ CNM

## 2019-12-29 NOTE — PLAN OF CARE
VSS and afebrile. Patient using hot packs, repositioning, and ambulation for pain management. Support person-friend, at bedside throughout shift. CNM at bedside to discuss plan of care with patient, and removed cervadil. Provider discussed placing law bulb as a mechanical cervical ripening agent. Or, patient offered to use low dose pitocin throughout shift and recheck cervix in the am. Patient agreed to placement of law bulb with IV fentanyl given. Tolerated law bulb placement. Bloody show present. Contractions spaced and started pitocin at 0622. Patient tolerating contractions and able to rest between contractions. Will continue to monitor and update provider with any question or concerns.

## 2019-12-29 NOTE — PROGRESS NOTES
[facility] Labor and Delivery Progress Note    Sagrario Meyer MRN# [id number]   Age: 39 year old YOB: 1980         Subjective:   Sagrario is resting in bed, her breakfast just arrived and she plans to eat and then walk a little. She is using a hot pack for comfort with uterine contractions.            Objective:     Patient Vitals for the past 24 hrs:   BP Temp Temp src Resp Oximeter Heart Rate   19 0457 112/65 97.5  F (36.4  C) Oral 16 105 bpm   19 2318 105/58 98.1  F (36.7  C) Oral 16 86 bpm   19 1853 113/55 97.1  F (36.2  C) Oral 20 85 bpm   19 1539 129/73 97.8  F (36.6  C) Oral 16 103 bpm   19 1300 114/55 97.7  F (36.5  C) Oral 20 81 bpm   19 0900 126/66 98  F (36.7  C) Oral 20 --         Cervical Exam: 1 / 50% / -2      Position: Posterior    Membranes: Intact     Fetal Heart Rate:    Monitor: external US    Variability: moderate (amplitude range 6 to 25 bpm)    Baseline Rate: normal range 130    Fetal Heart Rate Tracing: cat I    Contractions: q4mins, lasting  seconds, moderate to palpations.    Pitocin is going at 3mU        Assessment:   Sagrario Meyer is a 39 year old  who is 39w6d here with IOL for AMA and polyhydramnios.        Plan:   We discussed use of epidural again this morning and answered questions to her satisfaction.   Applauded her great perspective on the lengthy IOL, she is tolerating the stay well.  Labor induction with Pitocin and law bulb.  Law still well in place with gentle traction.   MD updated regarding progress and plan at morning report.      Rachel Solis     Addendum: 1200 Gentle traction on law, still firmly in place.

## 2019-12-29 NOTE — PROGRESS NOTES
[facility] Labor and Delivery Progress Note    Sagrario Meyer MRN# [id number]   Age: 39 year old YOB: 1980         Subjective:   Sagrario is still using warm packs for uterine contraction pain which she describes as mild and occasional. She now has an additional 3 female friends visiting from [place] to support her.            Objective:     Patient Vitals for the past 24 hrs:   BP Temp Temp src Resp Oximeter Heart Rate   19 1233 118/69 97.9  F (36.6  C) Oral 20 99 bpm   19 0913 112/65 98.3  F (36.8  C) Oral 20 85 bpm   19 0457 112/65 97.5  F (36.4  C) Oral 16 105 bpm   19 2318 105/58 98.1  F (36.7  C) Oral 16 86 bpm   19 1853 113/55 97.1  F (36.2  C) Oral 20 85 bpm   19 1539 129/73 97.8  F (36.6  C) Oral 16 103 bpm       Cervical Exam: deferred    Membranes: Intact     Fetal Heart Rate:    Monitor: external US    Variability: moderate (amplitude range 6 to 25 bpm)    Baseline Rate: normal range 140   Fetal Heart Rate Tracing: cat I    Contractions: q2-5 minutes lasting  seconds, mild to palpation    Pitocin: 9 mU    +2 pitting edema noted on BBLE.           Assessment:   Sagrario Meyer is a 39 year old  who is 39w6d here with IOL for AMA and polyhydramnios.         Plan:   -Gentle traction on law which is still firmly in place.   -12 hours of placement reached and given slow induction, plan to leave in place up to another 12 hours with 24 hours of placement max.   -Compression socks ordered for edema.   -Labor induction with Pitocin and law bulb      Rachel Solis

## 2019-12-29 NOTE — PROGRESS NOTES
Labor progress note    S: Pt resting on/off.      O:  Blood pressure 112/65, pulse 75, temperature 97.5  F (36.4  C), temperature source Oral, resp. rate 16, last menstrual period 2019, not currently breastfeeding.  General appearance: comfortable.  Contractions: Every 3-8 minutes. 60-80 seconds duration.  Palpate: mild and moderate.  Soft resting tone.   FHT: Baseline 135 with moderate variability with periods of minimal. Accelerations are present. no decelerations present.  ROM: not ruptured.   PELVIC EXAM:deferred.   Treviño Score: Total:  with last SVE    Pitocin- none,  Antibiotics- none  IV saline lock   Law balloon in place since ~0145  IV Fentanyl @~0123            A:  39 year old  with IUP @ 39w6d IOL for AMA 40yo at term  Polyhydramnios   Reotrplacental Fibroid    Fetal Heart Rate Tracing category two + Accels, Periods of moderate variability, no decels  GBS- negative    Patient Active Problem List   Diagnosis     Cystic acne     Latent tuberculosis     Redness of right eye     Chronic right-sided low back pain without sciatica     Vitamin D deficiency     AMA (advanced maternal age) multigravida 35+     Subserous leiomyoma of uterus     Supervision of high risk pregnancy BILLIE SHIPLEY care     Multigravida of advanced maternal age in third trimester     Labor and delivery, indication for care         P:  Ambulation, hydration, position changes, birthing ball/sling and tub options to facilitate labor. Encouraged continued rest if able.   Continue cervical ripening with law balloon per protocol    Reevaluate in 2-4 hours and prn    Isamar RAMIREZ, LINDA

## 2019-12-29 NOTE — PLAN OF CARE
D: Marrero catheter in place, Pitocin running and increased per protocol. Currently at 11mu. Patient states contraction pain in her back and lower abdomen using heat pad with relief. Patient changing positions multiple times. P: Continue to monitor.

## 2019-12-29 NOTE — PROGRESS NOTES
Labor progress note    S: Pt starting feel more cramping/contractions in the front - back pain is now subsiding.  Was up ambulating earlier and then trying to rest, declined any therapeutic sleep agents.   Has female supports at bedside.  Consents to have Cervidil removed at ~0015 as planned.  Reviewed self removal vs provider removal.  Pt declines to remove herself but will get up to bathroom to void before.     Declines to use Nitrous Oxide for Cervidil removal.   Reviewed topical lidocaine gel to introitus and ~1-2cm inside vagina to aid in immediate discomfort with recommended SVE for plan of care discussion - offered for pt to place herself or have provider place, pt in agreement but would prefer provider to place.  Agrees to try Nitrous Oxide with lidocaine gel placement.     O:  Blood pressure 105/58, pulse 75, temperature 98.1  F (36.7  C), temperature source Oral, resp. rate 16, last menstrual period 03/25/2019, not currently breastfeeding.  General appearance: uncomfortable with contractions but coping well, can rest through or in between.  Contractions: Every 2-4 minutes. 60-90 seconds duration.  Palpate: mild and moderate.  Soft resting tone.   FHT: Baseline 135 with moderate variability. Accelerations are present. no decelerations present.  Periods of loss of EFM contact d/t maternal positioning, EFM reasjusted.   ROM: not ruptured.   PELVIC EXAM:deferred.  Cervidil removed intact with pt consent.    Pt tolerated overall well.    Pt then consented to 2%  local lidocaine gel placement at introitus of vagina - used Nitrous Oxide with limited benefit.  Pt aware of need to wait ~5-10 minutes before SVE for gel to take effect.   Treviño Score:3-4/13 on last SVE    Pitocin- none,  Antibiotics- none  IV saline lock  Cervidil placed 12/28/2019 at 1215, removed intact 12/29/2019 0019    T&S expires 12/29/19 ~1600.     # Pain Assessment:    - Sagrario is experiencing pain due to cervical ripening. Pain management was  discussed with Sagrario and her friends and the plan was created in a collaborative fashion.  Sagrario's response to the current recommendations: engaged  - Continues to decline pharmacologic options.  Will consider Nitrous Oxide.         A:  39 year old  with IUP @ 39w5d IOL for AMA 38yo by RIO and Polyhydramnios  Mild Polyhydramnios  Retroplacental Fibroid       Fetal Heart Rate Tracing category one  GBS- negative    Patient Active Problem List   Diagnosis     Cystic acne     Latent tuberculosis     Redness of right eye     Chronic right-sided low back pain without sciatica     Vitamin D deficiency     AMA (advanced maternal age) multigravida 35+     Subserous leiomyoma of uterus     Supervision of high risk pregnancy [facility] MD care     Multigravida of advanced maternal age in third trimester     Labor and delivery, indication for care         P:  Plan SVE with pt consent using Nitrous Oxide once lidocaine gel has taken effect.  Will discuss plan of care once SVE obtained.     Isamar RAMIREZ, LINDA        ADDENDUM 2019 12:56 AM   - Pt consented to SVE.  -/-2 posterior, soft.  Treviño score 6.  Reviewed Law balloon vs low dose pitocin.  Pt discussed with her female supports and agrees to attempt law balloon placement with IV Fentanyl dose.  ASHLEY Brooks CNM

## 2019-12-29 NOTE — PROGRESS NOTES
Labor progress note    S: Pt no longer feeling as frequent contractions.  Reviewed againrisk/benefit of law balloon placement with IV Fentanyl vs low dose pitocin and recheck SVE in 8-12 hours. Pt consented to attempt law balloon placement with IV Fentanyl for pain management.     O:  Blood pressure 105/58, pulse 75, temperature 98.1  F (36.7  C), temperature source Oral, resp. rate 16, last menstrual period 03/25/2019, not currently breastfeeding.  General appearance: uncomfortable with contractions but coping well.  Contractions: Every 2-3 minutes.  seconds duration.  Palpate: mild and moderate.  Soft resting tone.   FHT: Baseline 130 with moderate variability, periods of minimal. Accelerations are present. no decelerations present.  ROM: not ruptured.   PELVIC EXAM:1.5/ 60%/ Posterior/ soft/ -2 /   Treviño Score: 6/13    Pitocin- none,  Antibiotics- none  IV saline lock  100mcg IV Fentanyl given     Pt assisted with feet to foot rests and bed broken.  Pt had discomfort in her hips so she opted to have stirrups used and her hips were much more comfortable.  Cervix was visualized under sterile technique with medium Graves speculum. Cervix was cleansed x3 with Betadine after verified no shellfish or iodine allergy.  Law cathter was passed through cervix using Stylet with scant bloody show noted.   Balloon inflated to 60ml with normal saline and speculum removed.  Pt consented to SVE and no balloon palapted in cervix - additional 10ml of normal saline inserted so balloon inflated to 70ml total.  Bed put back together, pt assisted legs off of stirrups.  Law catheter to tension to pt's thigh.  Pad and mesh underwear in place per pt request.  Pt tolerated well.       # Pain Assessment:    - Sagrario is experiencing pain due to cervical ripening with law balloon placement. Pain management was discussed with Sagrario and her friends and the plan was created in a collaborative fashion.  Sagrario's response to the  current recommendations: engaged  - IV Fentanyl         A:  39 year old  with IUP @ 39w6d IOL for AMA 40yo by RIO and Polyhydramnios  Mild Polyhydramnios  Retroplacental Fibroid  Treviño score <9, primip   Fetal Heart Rate Tracing category two +Aceels, No decels, Periods of moderate variability   GBS- negative    Patient Active Problem List   Diagnosis     Cystic acne     Latent tuberculosis     Redness of right eye     Chronic right-sided low back pain without sciatica     Vitamin D deficiency     AMA (advanced maternal age) multigravida 35+     Subserous leiomyoma of uterus     Supervision of high risk pregnancy [facility] MD care     Multigravida of advanced maternal age in third trimester     Labor and delivery, indication for care         P:  Ambulation, hydration, position changes, birthing ball/sling and tub options to facilitate labor. At present encouraged rest if able.   Pain management options reviewed with pt. Pt is coping well with IV Fentanyl dose.  Aware no Nitrous Oxide for 2 hours after dose.  Can have additional IV Fentanyl prn.  Or can choose Vistaril prn.   Continue cervical ripening with Marrero balloon until ~1400 or if SROM or self discontinues.   Consider low dose pitocin with Marrero balloon in place if infrequent contractions noted.   Reevaluate in 2-4 hours and prn    Isamar RAMIREZ CNM

## 2019-12-29 NOTE — PROGRESS NOTES
[facility] Labor and Delivery Progress Note    Sagrario Meyer MRN# [id number]   Age: 39 year old YOB: 1980         Subjective:   Sagrario is sitting at the side of the bed, still not experiencing contractions as painful. She is wondering how long we will continue before she needs a different plan.            Objective:     Patient Vitals for the past 24 hrs:   BP Temp Temp src Resp Oximeter Heart Rate   19 1623 119/66 98.2  F (36.8  C) Oral 18 90 bpm   19 1439 129/68 98  F (36.7  C) Oral 20 89 bpm   19 1233 118/69 97.9  F (36.6  C) Oral 20 99 bpm   19 0913 112/65 98.3  F (36.8  C) Oral 20 85 bpm   19 0457 112/65 97.5  F (36.4  C) Oral 16 105 bpm   19 2318 105/58 98.1  F (36.7  C) Oral 16 86 bpm   19 1853 113/55 97.1  F (36.2  C) Oral 20 85 bpm         Cervical Exam: deferred.     Membranes: Intact     Fetal Heart Rate:    Monitor: external US    Variability: minimal (detectable, amplitude less than or equal to 5 bpm)    Baseline Rate: normal range 150   Fetal Heart Rate Tracing: cat I    Contractions: q3-4 mins, lasting 90 seconds,  Mild to palpation.     Pitocin: 12mU        Assessment:   Sagrario Meyer is a 39 year old  who is 39w6d here with IOL for AMA and polyhydramnios. Large uterine fibroid.           Plan:   Reviewed plan and MFM US with Dr. Encarnacion who recommends AROM as soon as law is out.   Labor induction with Pitocin and law bulb  Discussed with pt that we will continue to offer traction on law and will attempt to AROM once law is out.       Rachel Solis

## 2019-12-29 NOTE — PROVIDER NOTIFICATION
12/29/19 1330   Provider Notification   Provider Name/Title Prema Solis LINDA   Method of Notification In Department   Notification Reason Uterine Activity   D: Law has been in place for 12 hours.  Applied traction and law did not come out. At this time we will leave law in place and continue to increased the pitocin per protocol. P: Continue to monitor.

## 2019-12-30 ENCOUNTER — ANESTHESIA (OUTPATIENT)
Dept: OBGYN | Facility: CLINIC | Age: 39
End: 2019-12-30
Payer: COMMERCIAL

## 2019-12-30 ENCOUNTER — ANESTHESIA EVENT (OUTPATIENT)
Dept: OBGYN | Facility: CLINIC | Age: 39
End: 2019-12-30
Payer: COMMERCIAL

## 2019-12-30 PROCEDURE — 25000125 ZZHC RX 250: Performed by: ADVANCED PRACTICE MIDWIFE

## 2019-12-30 PROCEDURE — 25000128 H RX IP 250 OP 636: Performed by: STUDENT IN AN ORGANIZED HEALTH CARE EDUCATION/TRAINING PROGRAM

## 2019-12-30 PROCEDURE — 25800030 ZZH RX IP 258 OP 636: Performed by: ADVANCED PRACTICE MIDWIFE

## 2019-12-30 PROCEDURE — 25000125 ZZHC RX 250: Performed by: ANESTHESIOLOGY

## 2019-12-30 PROCEDURE — 10907ZC DRAINAGE OF AMNIOTIC FLUID, THERAPEUTIC FROM PRODUCTS OF CONCEPTION, VIA NATURAL OR ARTIFICIAL OPENING: ICD-10-PCS | Performed by: ADVANCED PRACTICE MIDWIFE

## 2019-12-30 PROCEDURE — 12000001 ZZH R&B MED SURG/OB UMMC

## 2019-12-30 RX ORDER — NALOXONE HYDROCHLORIDE 0.4 MG/ML
.1-.4 INJECTION, SOLUTION INTRAMUSCULAR; INTRAVENOUS; SUBCUTANEOUS
Status: DISCONTINUED | OUTPATIENT
Start: 2019-12-30 | End: 2019-12-31

## 2019-12-30 RX ORDER — MISOPROSTOL 200 UG/1
TABLET ORAL
Status: DISCONTINUED
Start: 2019-12-30 | End: 2019-12-30 | Stop reason: HOSPADM

## 2019-12-30 RX ORDER — LIDOCAINE HYDROCHLORIDE AND EPINEPHRINE 15; 5 MG/ML; UG/ML
INJECTION, SOLUTION EPIDURAL PRN
Status: DISCONTINUED | OUTPATIENT
Start: 2019-12-30 | End: 2019-12-31

## 2019-12-30 RX ORDER — FENTANYL/BUPIVACAINE/NS/PF 2-1250MCG
PLASTIC BAG, INJECTION (ML) INJECTION
Status: COMPLETED
Start: 2019-12-30 | End: 2019-12-30

## 2019-12-30 RX ORDER — OXYTOCIN/0.9 % SODIUM CHLORIDE 30/500 ML
PLASTIC BAG, INJECTION (ML) INTRAVENOUS
Status: DISCONTINUED
Start: 2019-12-30 | End: 2019-12-30 | Stop reason: HOSPADM

## 2019-12-30 RX ORDER — EPHEDRINE SULFATE 50 MG/ML
5 INJECTION, SOLUTION INTRAMUSCULAR; INTRAVENOUS; SUBCUTANEOUS
Status: DISCONTINUED | OUTPATIENT
Start: 2019-12-30 | End: 2019-12-31

## 2019-12-30 RX ORDER — NALBUPHINE HYDROCHLORIDE 10 MG/ML
2.5-5 INJECTION, SOLUTION INTRAMUSCULAR; INTRAVENOUS; SUBCUTANEOUS EVERY 6 HOURS PRN
Status: DISCONTINUED | OUTPATIENT
Start: 2019-12-30 | End: 2019-12-31

## 2019-12-30 RX ORDER — LIDOCAINE HYDROCHLORIDE 10 MG/ML
INJECTION, SOLUTION EPIDURAL; INFILTRATION; INTRACAUDAL; PERINEURAL
Status: DISCONTINUED
Start: 2019-12-30 | End: 2019-12-30 | Stop reason: HOSPADM

## 2019-12-30 RX ORDER — EPHEDRINE SULFATE 50 MG/ML
INJECTION, SOLUTION INTRAMUSCULAR; INTRAVENOUS; SUBCUTANEOUS
Status: DISCONTINUED
Start: 2019-12-30 | End: 2019-12-31 | Stop reason: WASHOUT

## 2019-12-30 RX ORDER — OXYTOCIN 10 [USP'U]/ML
INJECTION, SOLUTION INTRAMUSCULAR; INTRAVENOUS
Status: DISCONTINUED
Start: 2019-12-30 | End: 2019-12-30 | Stop reason: HOSPADM

## 2019-12-30 RX ADMIN — LIDOCAINE HYDROCHLORIDE,EPINEPHRINE BITARTRATE 3 ML: 15; .005 INJECTION, SOLUTION EPIDURAL; INFILTRATION; INTRACAUDAL; PERINEURAL at 20:36

## 2019-12-30 RX ADMIN — Medication: at 20:47

## 2019-12-30 RX ADMIN — Medication 10 ML/HR: at 20:42

## 2019-12-30 RX ADMIN — SODIUM CHLORIDE, POTASSIUM CHLORIDE, SODIUM LACTATE AND CALCIUM CHLORIDE: 600; 310; 30; 20 INJECTION, SOLUTION INTRAVENOUS at 16:57

## 2019-12-30 RX ADMIN — LIDOCAINE HYDROCHLORIDE: 20 JELLY TOPICAL at 14:23

## 2019-12-30 RX ADMIN — Medication 8 ML: at 20:42

## 2019-12-30 RX ADMIN — LIDOCAINE HYDROCHLORIDE: 20 JELLY TOPICAL at 06:06

## 2019-12-30 RX ADMIN — Medication 10 MILLI-UNITS/MIN: at 06:49

## 2019-12-30 RX ADMIN — SODIUM CHLORIDE, POTASSIUM CHLORIDE, SODIUM LACTATE AND CALCIUM CHLORIDE 500 ML: 600; 310; 30; 20 INJECTION, SOLUTION INTRAVENOUS at 20:30

## 2019-12-30 RX ADMIN — LIDOCAINE HYDROCHLORIDE: 20 JELLY TOPICAL at 18:41

## 2019-12-30 NOTE — PROGRESS NOTES
Labor progress note    S: CNM to unit to assess pt and perform SVE and AROM if clinically indicated per discussion previously with pt.  CNM into room and lights noted low - pt sleeping and did not waken with CNM talking to her support person.      O:  Blood pressure 116/62, pulse 75, temperature 97.6  F (36.4  C), temperature source Oral, resp. rate 20, last menstrual period 2019, not currently breastfeeding.  General appearance: comfortable.  Contractions: Every 2-4 minutes.  seconds duration.  Palpate: mild and moderate.  Soft resting tone.   FHT: Baseline 135 with moderate variability. Accelerations are present. no decelerations present.  ROM: not ruptured.   PELVIC EXAM:deferred      Pitocin- 15 mu/min.,  Antibiotics- none  IV at maintenance rate          A:  39 year old  with IUP @ 39w6d IOL for AMA 40yo by RIO  Polyhydramnios (mild)  Retroplacental fibroid     Fetal Heart Rate Tracing category one  GBS- negative    Patient Active Problem List   Diagnosis     Cystic acne     Latent tuberculosis     Redness of right eye     Chronic right-sided low back pain without sciatica     Vitamin D deficiency     AMA (advanced maternal age) multigravida 35+     Subserous leiomyoma of uterus     Supervision of high risk pregnancy WHDYLLAN SHIPLEY care     Multigravida of advanced maternal age in third trimester     Labor and delivery, indication for care         P:  - Pt noted resting in R lateral with peanut ball to facilitate fetal rotation and decent.  Discussed with bedside RN prefer not to wake pt for AROM at this time as pt is leaning toward unmedicated labor and sleep is recommended if possible at this point in IOL process.   Reviewed with RN if pt wakes and is up laboring page CNM to return to again discuss SVE and AROM, is agreeable.    - Reviewed recommendation for SVE for pt to use topical lidocaine jelly and then Nitrous Oxide with exam.  If clinically indicated for AROM will recommend IV Fentanyl dose  like had with Law balloon placement since pt was able to much better tolerate that procedure.  Will discuss more with pt once awake.    - Plan strongly recommend SVE by ~0600 as will then be ~12 hours of induction dosing of pitocing s/p law balloon out if pt willing.    - Continue labor induction with Pitocin per protocol.   Reevaluate in 2 hours and prn    Isamar RAMIREZ, CNM

## 2019-12-30 NOTE — PROGRESS NOTES
Labor progress note    S: Pt coping well with long cervical ripening process, continues to have multiple female supports at bedside.  States she was feeling cramping in the morning but now not so much.  Consents to this writer to put tension on law balloon, pt prefers to stand to have it done.     O:  Blood pressure 125/69, pulse 75, temperature 98.2  F (36.8  C), temperature source Oral, resp. rate 16, last menstrual period 2019, not currently breastfeeding.  General appearance: comfortable.  Contractions: Every 2-6 minutes.  seconds duration.  Palpate: mild.  Soft resting tone.   FHT: Baseline 145 with moderate variability - period of minimal. Accelerations are present. no decelerations present.  ROM: not ruptured.   PELVIC EXAM:deferred.  Law balloon discontinued intact with steady, gentle traction while pt standing.     Treviño Score: Total: /13 with law balloon placement     Pitocin- decreased from 14mu/min to 7 mu/min per this writer's request  Antibiotics- none  IV at maintenance rate  T&S again up to date now until 20        A:  39 year old  with IUP @ 39w6d IOL for AMA age 39 at RIO  Polyhydramnios  Retroplacental fibroid   Fetal Heart Rate Tracing category two. +Accels, Moderate variability   GBS- negative    Patient Active Problem List   Diagnosis     Cystic acne     Latent tuberculosis     Redness of right eye     Chronic right-sided low back pain without sciatica     Vitamin D deficiency     AMA (advanced maternal age) multigravida 35+     Subserous leiomyoma of uterus     Supervision of high risk pregnancy BILLIE SHIPLEY care     Multigravida of advanced maternal age in third trimester     Labor and delivery, indication for care         P:  Ambulation, hydration, position changes, birthing ball/sling and tub options to facilitate labor.  Labor induction with Pitocin reviewed with pt - now will titrate up to 20mu/min to achieve labor. Agreeable to plan.      - Briefly reviewed  recommendation for SVE and AROM when regular contraction pattern noted, pt is agreeable to discuss more about risk/benefit of AROM.   - Reevaluate in 2 hours and prn    Isamar RAMIREZ, LINDA        ADDENDUM 12/29/2019 7:01 PM  - Reviewed with pt again plan of care recommendations - pitocin now for induction - titrate up by 2mu/min q20-30 minutes up to 20mu/min.  Reviewed recommendation for AROM as soon as clinically indicated.  Reviewed risk/benefit of AROM including infection, cord prolapse.  At this time pt can be assumed to be between 3-5cm dilated d/t 70ml law balloon out so with pt's discomfort with SVE recommend no SVE at this time to assess as the recommendation would be pitocin. Pt is in agreement to await SVE until clinically indicated for AROM or  Prn maternal/fetal status.  - Encouraged upright mobility - pt to ambulate.   - Reinforced need to PO hydrate, pt will try to increase.   - All pt's questions discussed and answered.  Pt verbalized understanding of and agreement to plan of care.

## 2019-12-30 NOTE — PROVIDER NOTIFICATION
12/29/19 2006   Provider Notification   Provider Name/Title SHO Concepcion CNM    Method of Notification In Department   Notification Reason Status Update   plan per provider to titrate pitocin per policy overnight, assess in morning for potential AROM. Will continue to monitor closely.

## 2019-12-30 NOTE — PROGRESS NOTES
Blood pressure 110/55, pulse 75, temperature 98.1  F (36.7  C), temperature source Oral, resp. rate 16, last menstrual period 03/25/2019, not currently breastfeeding.     Pt comfortable, notes some very mild, intermittent back pain. Resting in bed, easier to  contractions with toco; was on birthing ball and ambulating in hallways. Open to discussion re: plan of care.  Pt hesitant to do AROM, as she has concerns if it does not help labor progress.  Pt verbalized confusion why she was recommended for induction, nothing happening, etc.    General appearance: comfortable  CONTRACTIONS: irreg, mild and every 5-7 minutes  Pitocin- 6 mu/min.,  Antibiotics- none  FETAL HEART TONES: continuous EFM- baseline 135 with moderate variability and positive accelerations. No decelerations.  ROM: not ruptured  PELVIC EXAM: deferred    ASSESSMENT:  ==============  IUP @ 40w0d for induction of labor.  Indication: AMA at term   Fetal Heart Rate Tracing category one  GBS- negative    PLAN:  ===========  Discussed reason for induction with AMA and polyhydramnios and risks/benefits. Discussed induction process and AROM, pt had appropriate questions about possible risks and effects on labor.  Discussed possible effect of polyhydramnios with contractions and Pitocin dosing. Recommended evaluation with possible AROM to continue induction process.  Pt would open to re-evaluate in 2 hours, will continue to consider AROM.  Ambulation, hydration, position changes, birthing ball/sling and tub options to facilitate labor.  Continue labor induction with Pitocin   Reevaluate in 2 hours    ASHLEY Anna CNM

## 2019-12-30 NOTE — PROVIDER NOTIFICATION
12/30/19 0449   Provider Notification   Provider Name/Title SHO Concepcion CNM    Method of Notification Phone   Notification Reason Labor Status   plan per provider to stop titrating pitocin at this time until evaluation at 0600. Will assess for potential AROM at that time. Will continue to monitor closely.

## 2019-12-30 NOTE — PLAN OF CARE
VSS. Patient afebrile. Membranes intact and patient declining SVE and rupture of membranes at this time. Scant bloody show. Marrero bulb for cervical ripening removed by Isamar Concepcion at 1818. Pitocin titrated per protocol. Patient not uncomfortable with contractions and ambulating in halls and using birthing ball, taking rests in bed with peanut ball. Radha tried to correct coupling of contractions.  FHT cat I tracing, will continue to monitor.

## 2019-12-30 NOTE — PROGRESS NOTES
Labor progress note    S: Pt was resting when midwife into room , woke up but would like to try to rest.  Agreeable to peanut ball positioning while resting.     O:  Blood pressure 127/61, pulse 75, temperature 98.2  F (36.8  C), temperature source Oral, resp. rate 16, last menstrual period 2019, not currently breastfeeding.  General appearance: comfortable.  Contractions: Every 2-6 minutes.  seconds duration.  Palpate: mild.  Soft resting tone.  Uterine coupling noted.   FHT: Baseline 140 with moderate variability. Accelerations are present. no decelerations present.  ROM: not ruptured.   PELVIC EXAM:deferred  Hardy Score: 6/13 before law balloon - presumed 3-5cm dilated s/p 70 ml balloon out.  So hardy score assumed 8-9 if nothing else about SVE has change    Pitocin- 10 mu/min.,  Antibiotics- none  IV at maintenance rate  `      A:  39 year old  with IUP @ 39w6d IOL for AMA 40yo   Polyhydramnios (mild)  Retroplacental fibroid     Fetal Heart Rate Tracing category two  GBS- negative    Patient Active Problem List   Diagnosis     Cystic acne     Latent tuberculosis     Redness of right eye     Chronic right-sided low back pain without sciatica     Vitamin D deficiency     AMA (advanced maternal age) multigravida 35+     Subserous leiomyoma of uterus     Supervision of high risk pregnancy BILLIE SHIPLEY care     Multigravida of advanced maternal age in third trimester     Labor and delivery, indication for care         P:  Ambulation, hydration, position changes, birthing ball/sling and tub options to facilitate labor.  Pt agreeable to peanut ball while resting to encourage fetal rotation and descent.   Agreeable to consider sifting/rebozo when awake.   Continue labor induction with Pitocin per protocol. Once established in regular contraction pattern  pt is amenable to SVE and AROM.    Reassess 2 hours and prn     Isamar RAMIREZ CNM        ADDENDUM 2019 9:54 PM  - LINDA called to bedside by  "RN stating Pt is awake and amenable to sifting.  Pt noted standing at bedside, starting to feel some discomfort with contractions - mostly in her front abdomen but some in her back.  Bed raised, pt leaning over and \"shaking the apples\" sifting/rebozzo technique done between 4 contractions.  Pt swaying hips/doing squats during contractions.  Reviewed recommendation for ~2 hours of labor and then reassess.  If continuing to have uncomfortable contractions recommend SVE and if clinically indicated AROM.  Pt continues to be undecided about epidural - coping at this point.  R calf measurements done for knee high compression stockings, will try medium/regular size.  Bedside RN in agreement with plan.  ASHLEY Brooks CNM    "

## 2019-12-30 NOTE — PLAN OF CARE
Pt resting intermittently throughout the night. SVE per CNM 3/70%/-2 at 0615. Plan per provider to discontinue pitocin for 30 minutes, then start again at half dose. Pt agreeable to plan. Uses nitrous and topical lidocaine for cervical exams. Will continue to monitor closely.

## 2019-12-30 NOTE — PROGRESS NOTES
Labor progress note    S: Pt continues to rest per RN report.     O:  Blood pressure 114/53, pulse 75, temperature 98.6  F (37  C), temperature source Oral, resp. rate 18, last menstrual period 2019, not currently breastfeeding.  General appearance: comfortable.  Contractions: Every 2-6 minutes.  seconds duration.  Palpate: mild.  Soft resting tone.  Uterine coupling noted  FHT: Baseline 140 with moderate variability. Accelerations are present. no decelerations present.  ROM: not ruptured.   PELVIC EXAM:deferred  Treviño Score: Total:  before law balloon    Pitocin- 19 mu/min.,  Antibiotics- none  IV maintenance rate  Law balloon out 19 at 1815        A:  39 year old  with IUP @ 40w0d IOL for AMA 38yo by RIO  Polyhydramnios - last BOBO 26.5cm  Retroplacental Firboid   Fetal Heart Rate Tracing category one  GBS- negative    Patient Active Problem List   Diagnosis     Cystic acne     Latent tuberculosis     Redness of right eye     Chronic right-sided low back pain without sciatica     Vitamin D deficiency     AMA (advanced maternal age) multigravida 35+     Subserous leiomyoma of uterus     Supervision of high risk pregnancy WHDYLLAN SHIPLEY care     Multigravida of advanced maternal age in third trimester     Labor and delivery, indication for care         P:  - Ambulation, hydration, position changes, birthing ball/sling and tub options to facilitate labor - again at this time recommend continued rest if able.  RN will update CNM if pt awake earlier for assessment prn.   - Continue labor induction with Pitocin per protocol - again recommend SVE at 12 hours of pitocin at IOL dosing s/p law balloon out ~0630 and AROM when clinically appropriate.   Recommend using Nitrous Oxide with SVE and then if AROM recommended consider IV Fentanyl which was successful for pt with law balloon placement.   - Pt previously agreed to ATMSL with IV pitocin and [place] Misoprostol right after baby delivered.   Plan additional uterotonics at bedside.     Reevaluate in 2-4 hours and prn    Isamar RAMIREZ, KOKOM

## 2019-12-30 NOTE — PROGRESS NOTES
Labor progress note    S: Pt feels rested, agreeable to SVE and discussion of AROM if clinically indicated.  Pt up for spontaneous void, noted +small bloody show.  Agreeable to topical lidocaine and then Nitrous Oxide with SVE.   ASHLEY Brooks CNM        ADDENDUM 2019 6:24 AM    S: Pt agreeable to SVE with Nitrous Oxide.     O:  Blood pressure 114/53, pulse 75, temperature 98.6  F (37  C), temperature source Oral, resp. rate 18, last menstrual period 2019, not currently breastfeeding.  General appearance: comfortable.  Contractions: Every 2-5 minutes. 60-90 seconds duration.  Palpate: mild.  Soft resting tone.  Uterine coupling noted.   FHT: Baseline 140 with moderate variability with periods of minimal. Accelerations are present. no decelerations present.  ROM: not ruptured.   PELVIC EXAM:3/ 70%/ Mid/ soft/ -2.  Deviated to maternal R.   Asynclitic.   Treviño score Total: 8/13    Pitocin- 21 mu/min.,  Antibiotics- none  IV at maintenance rate`        A:  39 year old  with IUP @ 40w0d IOL for AMA age 39 at RIO  Polyhydramnios  Retroplacental fibroid      Fetal Heart Rate Tracing category one  GBS- negative    Patient Active Problem List   Diagnosis     Cystic acne     Latent tuberculosis     Redness of right eye     Chronic right-sided low back pain without sciatica     Vitamin D deficiency     AMA (advanced maternal age) multigravida 35+     Subserous leiomyoma of uterus     Supervision of high risk pregnancy BILLIE SHIPLEY care     Multigravida of advanced maternal age in third trimester     Labor and delivery, indication for care         P:  Ambulation, hydration, position changes, birthing ball/sling and tub options to facilitate labor.  Encourage squats, birthing ball, rebozzo/sifting.    Continue labor induction with Pitocin - at this time pitocin off for 30 minutes then restart at half, pt verbalized understanding of/agreement to plan.  AROM when clinically indicated - plan IV Fentanyl or  epidural before.    Reevaluate in 2 hours and prn    Isamar Concepcion APRN, CNM

## 2019-12-30 NOTE — PROGRESS NOTES
Blood pressure 105/53, pulse 75, temperature 98.7  F (37.1  C), temperature source Oral, resp. rate 16, last menstrual period 03/25/2019, not currently breastfeeding.    General appearance: comfortable. Agreeable to SVE with possible AROM.  CONTRACTIONS: mild and every 6-9 minutes  Pitocin- 10 mu/min.,  Antibiotics- none  FETAL HEART TONES: continuous EFM- baseline 135 with moderate variability and positive accelerations. No decelerations.  ROM: AROM for clear fluid with patient's consent  PELVIC EXAM: 5-6cm/ 60%/ Mid/ soft/ -1    ASSESSMENT:  ==============  IUP @ 40w0d for induction of labor.  Indication: AMA at term; polyhydramnios  Fetal Heart Rate Tracing category one  GBS- negative    PLAN:  ===========  Ambulation, hydration, position changes, birthing ball/sling and tub options to facilitate labor.  Reevaluate in 2-4 hours prn  Continue labor induction with Pitocin     ASHLEY AnnaM

## 2019-12-30 NOTE — PROGRESS NOTES
Blood pressure 136/77, pulse 75, temperature 97.7  F (36.5  C), temperature source Oral, resp. rate 18, last menstrual period 03/25/2019, not currently breastfeeding.  General appearance: comfortable. Sitting on birth ball. States she just did some exercises. Not feeling discomfort with contractions.     CONTRACTIONS: every 2-3 minutes, lasting  seconds, mild by palpation with soft resting tone between   Pitocin- 10 mu/min.,  Antibiotics- none  FETAL HEART TONES: continuous EFM- baseline 135 with moderate variability and positive accelerations. No decelerations.  ROM: not ruptured  PELVIC EXAM:deferred    ASSESSMENT:  ==============  IUP @ 40w0d for induction of labor.  Indication: AMA and polyhydramnios   Fetal Heart Rate Tracing category one  GBS- negative  Patient Active Problem List   Diagnosis     Cystic acne     Latent tuberculosis     Redness of right eye     Chronic right-sided low back pain without sciatica     Vitamin D deficiency     AMA (advanced maternal age) multigravida 35+     Subserous leiomyoma of uterus     Supervision of high risk pregnancy BILLIE SHIPLEY care     Multigravida of advanced maternal age in third trimester     Labor and delivery, indication for care     PLAN:  ===========  Recommended an hour break from the Pitocin. Then restart and titrate per protocol. Pt agreeable. Pitocin stopped at 0730.   Encouraged shower, walk around unit and breakfast at this time.   Discussed cervical exam with AROM after restarting Pitocin.   Ambulation, hydration, position changes, birthing ball/sling options to facilitate labor    ASHLEY Rg, LINDA

## 2019-12-30 NOTE — PLAN OF CARE
The pitocin was stopped this morning at 0730 and the patient took a shower, ate breakfast and walked in the hallways until 1045. Pitocin restarted at 2 mu and has been increased every 30 minutes. Contractions are every 5-7 minutes but patient remains comfortable with occ slight cramping.  with mod garrick and accels and no decels. Patient does not want to have her water broken at this time. Patient up in room on birthing ball and using squat bar. VSS.

## 2019-12-31 PROBLEM — Z98.891 S/P CESAREAN SECTION: Status: ACTIVE | Noted: 2019-12-31

## 2019-12-31 LAB
ABO + RH BLD: NORMAL
ABO + RH BLD: NORMAL
BLD GP AB SCN SERPL QL: NORMAL
BLD PROD TYP BPU: NORMAL
BLOOD BANK CMNT PATIENT-IMP: NORMAL
NUM BPU REQUESTED: 4
SPECIMEN EXP DATE BLD: NORMAL

## 2019-12-31 PROCEDURE — 25000128 H RX IP 250 OP 636: Performed by: STUDENT IN AN ORGANIZED HEALTH CARE EDUCATION/TRAINING PROGRAM

## 2019-12-31 PROCEDURE — 25800030 ZZH RX IP 258 OP 636: Performed by: STUDENT IN AN ORGANIZED HEALTH CARE EDUCATION/TRAINING PROGRAM

## 2019-12-31 PROCEDURE — 25000132 ZZH RX MED GY IP 250 OP 250 PS 637: Performed by: STUDENT IN AN ORGANIZED HEALTH CARE EDUCATION/TRAINING PROGRAM

## 2019-12-31 PROCEDURE — 40000170 ZZH STATISTIC PRE-PROCEDURE ASSESSMENT II: Performed by: OBSTETRICS & GYNECOLOGY

## 2019-12-31 PROCEDURE — 37000009 ZZH ANESTHESIA TECHNICAL FEE, EACH ADDTL 15 MIN: Performed by: OBSTETRICS & GYNECOLOGY

## 2019-12-31 PROCEDURE — 12000001 ZZH R&B MED SURG/OB UMMC

## 2019-12-31 PROCEDURE — 25000125 ZZHC RX 250: Performed by: STUDENT IN AN ORGANIZED HEALTH CARE EDUCATION/TRAINING PROGRAM

## 2019-12-31 PROCEDURE — 25800030 ZZH RX IP 258 OP 636: Performed by: ADVANCED PRACTICE MIDWIFE

## 2019-12-31 PROCEDURE — 25000125 ZZHC RX 250: Performed by: OBSTETRICS & GYNECOLOGY

## 2019-12-31 PROCEDURE — 25000132 ZZH RX MED GY IP 250 OP 250 PS 637

## 2019-12-31 PROCEDURE — 27110028 ZZH OR GENERAL SUPPLY NON-STERILE: Performed by: OBSTETRICS & GYNECOLOGY

## 2019-12-31 PROCEDURE — 36000057 ZZH SURGERY LEVEL 3 1ST 30 MIN - UMMC: Performed by: OBSTETRICS & GYNECOLOGY

## 2019-12-31 PROCEDURE — 40000977 ZZH STATISTIC ATTENDANCE AT DELIVERY

## 2019-12-31 PROCEDURE — 27210794 ZZH OR GENERAL SUPPLY STERILE: Performed by: OBSTETRICS & GYNECOLOGY

## 2019-12-31 PROCEDURE — 71000014 ZZH RECOVERY PHASE 1 LEVEL 2 FIRST HR: Performed by: OBSTETRICS & GYNECOLOGY

## 2019-12-31 PROCEDURE — 37000008 ZZH ANESTHESIA TECHNICAL FEE, 1ST 30 MIN: Performed by: OBSTETRICS & GYNECOLOGY

## 2019-12-31 PROCEDURE — C9290 INJ, BUPIVACAINE LIPOSOME: HCPCS | Performed by: STUDENT IN AN ORGANIZED HEALTH CARE EDUCATION/TRAINING PROGRAM

## 2019-12-31 PROCEDURE — 36000059 ZZH SURGERY LEVEL 3 EA 15 ADDTL MIN UMMC: Performed by: OBSTETRICS & GYNECOLOGY

## 2019-12-31 RX ORDER — PHENYLEPHRINE HCL IN 0.9% NACL 1 MG/10 ML
100 SYRINGE (ML) INTRAVENOUS EVERY 5 MIN PRN
Status: DISCONTINUED | OUTPATIENT
Start: 2019-12-31 | End: 2019-12-31

## 2019-12-31 RX ORDER — ONDANSETRON 2 MG/ML
4 INJECTION INTRAMUSCULAR; INTRAVENOUS EVERY 6 HOURS PRN
Status: DISCONTINUED | OUTPATIENT
Start: 2019-12-31 | End: 2020-01-02 | Stop reason: HOSPADM

## 2019-12-31 RX ORDER — KETOROLAC TROMETHAMINE 30 MG/ML
30 INJECTION, SOLUTION INTRAMUSCULAR; INTRAVENOUS EVERY 6 HOURS
Status: DISPENSED | OUTPATIENT
Start: 2019-12-31 | End: 2020-01-01

## 2019-12-31 RX ORDER — FLUMAZENIL 0.1 MG/ML
0.2 INJECTION, SOLUTION INTRAVENOUS
Status: DISCONTINUED | OUTPATIENT
Start: 2019-12-31 | End: 2019-12-31 | Stop reason: HOSPADM

## 2019-12-31 RX ORDER — DIPHENHYDRAMINE HYDROCHLORIDE 50 MG/ML
25 INJECTION INTRAMUSCULAR; INTRAVENOUS EVERY 6 HOURS PRN
Status: DISCONTINUED | OUTPATIENT
Start: 2019-12-31 | End: 2020-01-02 | Stop reason: HOSPADM

## 2019-12-31 RX ORDER — MORPHINE SULFATE 1 MG/ML
150 INJECTION, SOLUTION EPIDURAL; INTRATHECAL; INTRAVENOUS ONCE
Status: COMPLETED | OUTPATIENT
Start: 2019-12-31 | End: 2019-12-31

## 2019-12-31 RX ORDER — CEFAZOLIN SODIUM 2 G/100ML
2 INJECTION, SOLUTION INTRAVENOUS
Status: COMPLETED | OUTPATIENT
Start: 2019-12-31 | End: 2019-12-31

## 2019-12-31 RX ORDER — ONDANSETRON 2 MG/ML
INJECTION INTRAMUSCULAR; INTRAVENOUS PRN
Status: DISCONTINUED | OUTPATIENT
Start: 2019-12-31 | End: 2019-12-31

## 2019-12-31 RX ORDER — OXYTOCIN/0.9 % SODIUM CHLORIDE 30/500 ML
100 PLASTIC BAG, INJECTION (ML) INTRAVENOUS CONTINUOUS
Status: DISCONTINUED | OUTPATIENT
Start: 2019-12-31 | End: 2020-01-02 | Stop reason: HOSPADM

## 2019-12-31 RX ORDER — BUPIVACAINE HYDROCHLORIDE 2.5 MG/ML
INJECTION, SOLUTION EPIDURAL; INFILTRATION; INTRACAUDAL PRN
Status: DISCONTINUED | OUTPATIENT
Start: 2019-12-31 | End: 2019-12-31

## 2019-12-31 RX ORDER — OXYTOCIN/0.9 % SODIUM CHLORIDE 30/500 ML
PLASTIC BAG, INJECTION (ML) INTRAVENOUS CONTINUOUS PRN
Status: DISCONTINUED | OUTPATIENT
Start: 2019-12-31 | End: 2019-12-31

## 2019-12-31 RX ORDER — SIMETHICONE 80 MG
80 TABLET,CHEWABLE ORAL 4 TIMES DAILY PRN
Status: DISCONTINUED | OUTPATIENT
Start: 2019-12-31 | End: 2020-01-01

## 2019-12-31 RX ORDER — OXYTOCIN/0.9 % SODIUM CHLORIDE 30/500 ML
340 PLASTIC BAG, INJECTION (ML) INTRAVENOUS CONTINUOUS PRN
Status: DISCONTINUED | OUTPATIENT
Start: 2019-12-31 | End: 2020-01-02 | Stop reason: HOSPADM

## 2019-12-31 RX ORDER — NALBUPHINE HYDROCHLORIDE 10 MG/ML
2.5-5 INJECTION, SOLUTION INTRAMUSCULAR; INTRAVENOUS; SUBCUTANEOUS EVERY 6 HOURS PRN
Status: DISCONTINUED | OUTPATIENT
Start: 2019-12-31 | End: 2019-12-31

## 2019-12-31 RX ORDER — NALOXONE HYDROCHLORIDE 0.4 MG/ML
.1-.4 INJECTION, SOLUTION INTRAMUSCULAR; INTRAVENOUS; SUBCUTANEOUS
Status: DISCONTINUED | OUTPATIENT
Start: 2019-12-31 | End: 2019-12-31 | Stop reason: HOSPADM

## 2019-12-31 RX ORDER — AMOXICILLIN 250 MG
2 CAPSULE ORAL 2 TIMES DAILY PRN
Status: DISCONTINUED | OUTPATIENT
Start: 2019-12-31 | End: 2020-01-01

## 2019-12-31 RX ORDER — HYDROMORPHONE HYDROCHLORIDE 1 MG/ML
.3-.5 INJECTION, SOLUTION INTRAMUSCULAR; INTRAVENOUS; SUBCUTANEOUS EVERY 30 MIN PRN
Status: DISCONTINUED | OUTPATIENT
Start: 2019-12-31 | End: 2020-01-02 | Stop reason: HOSPADM

## 2019-12-31 RX ORDER — CITRIC ACID/SODIUM CITRATE 334-500MG
SOLUTION, ORAL ORAL
Status: COMPLETED
Start: 2019-12-31 | End: 2019-12-31

## 2019-12-31 RX ORDER — METHYLERGONOVINE MALEATE 0.2 MG/ML
200 INJECTION INTRAVENOUS
Status: DISCONTINUED | OUTPATIENT
Start: 2019-12-31 | End: 2020-01-02 | Stop reason: HOSPADM

## 2019-12-31 RX ORDER — LANOLIN 100 %
OINTMENT (GRAM) TOPICAL
Status: DISCONTINUED | OUTPATIENT
Start: 2019-12-31 | End: 2020-01-02 | Stop reason: HOSPADM

## 2019-12-31 RX ORDER — HYDROCORTISONE 2.5 %
CREAM (GRAM) TOPICAL 3 TIMES DAILY PRN
Status: DISCONTINUED | OUTPATIENT
Start: 2019-12-31 | End: 2020-01-02 | Stop reason: HOSPADM

## 2019-12-31 RX ORDER — MAGNESIUM HYDROXIDE 1200 MG/15ML
LIQUID ORAL PRN
Status: DISCONTINUED | OUTPATIENT
Start: 2019-12-31 | End: 2020-01-02 | Stop reason: HOSPADM

## 2019-12-31 RX ORDER — LIDOCAINE 40 MG/G
CREAM TOPICAL
Status: DISCONTINUED | OUTPATIENT
Start: 2019-12-31 | End: 2020-01-02 | Stop reason: HOSPADM

## 2019-12-31 RX ORDER — LIDOCAINE 40 MG/G
CREAM TOPICAL
Status: DISCONTINUED | OUTPATIENT
Start: 2019-12-31 | End: 2019-12-31

## 2019-12-31 RX ORDER — BUPIVACAINE HYDROCHLORIDE 7.5 MG/ML
INJECTION, SOLUTION INTRASPINAL PRN
Status: DISCONTINUED | OUTPATIENT
Start: 2019-12-31 | End: 2019-12-31

## 2019-12-31 RX ORDER — AMOXICILLIN 250 MG
1 CAPSULE ORAL 2 TIMES DAILY PRN
Status: DISCONTINUED | OUTPATIENT
Start: 2019-12-31 | End: 2020-01-01

## 2019-12-31 RX ORDER — CARBOPROST TROMETHAMINE 250 UG/ML
250 INJECTION, SOLUTION INTRAMUSCULAR
Status: DISCONTINUED | OUTPATIENT
Start: 2019-12-31 | End: 2020-01-02 | Stop reason: HOSPADM

## 2019-12-31 RX ORDER — OXYTOCIN 10 [USP'U]/ML
10 INJECTION, SOLUTION INTRAMUSCULAR; INTRAVENOUS
Status: DISCONTINUED | OUTPATIENT
Start: 2019-12-31 | End: 2020-01-02 | Stop reason: HOSPADM

## 2019-12-31 RX ORDER — ACETAMINOPHEN 325 MG/1
650 TABLET ORAL EVERY 4 HOURS PRN
Status: DISCONTINUED | OUTPATIENT
Start: 2020-01-03 | End: 2020-01-02 | Stop reason: HOSPADM

## 2019-12-31 RX ORDER — SODIUM CHLORIDE, SODIUM LACTATE, POTASSIUM CHLORIDE, CALCIUM CHLORIDE 600; 310; 30; 20 MG/100ML; MG/100ML; MG/100ML; MG/100ML
INJECTION, SOLUTION INTRAVENOUS CONTINUOUS
Status: DISCONTINUED | OUTPATIENT
Start: 2019-12-31 | End: 2019-12-31 | Stop reason: HOSPADM

## 2019-12-31 RX ORDER — CITRIC ACID/SODIUM CITRATE 334-500MG
30 SOLUTION, ORAL ORAL
Status: DISCONTINUED | OUTPATIENT
Start: 2019-12-31 | End: 2019-12-31 | Stop reason: HOSPADM

## 2019-12-31 RX ORDER — FENTANYL CITRATE 50 UG/ML
25-50 INJECTION, SOLUTION INTRAMUSCULAR; INTRAVENOUS
Status: DISCONTINUED | OUTPATIENT
Start: 2019-12-31 | End: 2019-12-31 | Stop reason: HOSPADM

## 2019-12-31 RX ORDER — NALOXONE HYDROCHLORIDE 0.4 MG/ML
.1-.4 INJECTION, SOLUTION INTRAMUSCULAR; INTRAVENOUS; SUBCUTANEOUS
Status: DISCONTINUED | OUTPATIENT
Start: 2019-12-31 | End: 2020-01-02 | Stop reason: HOSPADM

## 2019-12-31 RX ORDER — ACETAMINOPHEN 325 MG/1
975 TABLET ORAL EVERY 8 HOURS
Status: DISCONTINUED | OUTPATIENT
Start: 2019-12-31 | End: 2020-01-02 | Stop reason: HOSPADM

## 2019-12-31 RX ORDER — MISOPROSTOL 200 UG/1
400 TABLET ORAL
Status: DISCONTINUED | OUTPATIENT
Start: 2019-12-31 | End: 2020-01-02 | Stop reason: HOSPADM

## 2019-12-31 RX ORDER — PHENYLEPHRINE HCL IN 0.9% NACL 1 MG/10 ML
SYRINGE (ML) INTRAVENOUS CONTINUOUS PRN
Status: DISCONTINUED | OUTPATIENT
Start: 2019-12-31 | End: 2019-12-31

## 2019-12-31 RX ORDER — CEFAZOLIN SODIUM 1 G/3ML
1 INJECTION, POWDER, FOR SOLUTION INTRAMUSCULAR; INTRAVENOUS SEE ADMIN INSTRUCTIONS
Status: DISCONTINUED | OUTPATIENT
Start: 2019-12-31 | End: 2019-12-31 | Stop reason: HOSPADM

## 2019-12-31 RX ORDER — FENTANYL CITRATE 50 UG/ML
10 INJECTION, SOLUTION INTRAMUSCULAR; INTRAVENOUS ONCE
Status: COMPLETED | OUTPATIENT
Start: 2019-12-31 | End: 2019-12-31

## 2019-12-31 RX ORDER — DEXTROSE, SODIUM CHLORIDE, SODIUM LACTATE, POTASSIUM CHLORIDE, AND CALCIUM CHLORIDE 5; .6; .31; .03; .02 G/100ML; G/100ML; G/100ML; G/100ML; G/100ML
INJECTION, SOLUTION INTRAVENOUS CONTINUOUS
Status: DISCONTINUED | OUTPATIENT
Start: 2019-12-31 | End: 2020-01-02 | Stop reason: HOSPADM

## 2019-12-31 RX ORDER — MISOPROSTOL 200 UG/1
TABLET ORAL
Status: DISCONTINUED
Start: 2019-12-31 | End: 2019-12-31 | Stop reason: HOSPADM

## 2019-12-31 RX ORDER — BISACODYL 10 MG
10 SUPPOSITORY, RECTAL RECTAL DAILY PRN
Status: DISCONTINUED | OUTPATIENT
Start: 2020-01-02 | End: 2020-01-02 | Stop reason: HOSPADM

## 2019-12-31 RX ORDER — BUPIVACAINE HYDROCHLORIDE 7.5 MG/ML
1.6 INJECTION, SOLUTION EPIDURAL; RETROBULBAR ONCE
Status: DISCONTINUED | OUTPATIENT
Start: 2019-12-31 | End: 2019-12-31

## 2019-12-31 RX ORDER — NALOXONE HYDROCHLORIDE 0.4 MG/ML
.1-.4 INJECTION, SOLUTION INTRAMUSCULAR; INTRAVENOUS; SUBCUTANEOUS
Status: DISCONTINUED | OUTPATIENT
Start: 2019-12-31 | End: 2019-12-31

## 2019-12-31 RX ORDER — PROCHLORPERAZINE 25 MG
25 SUPPOSITORY, RECTAL RECTAL EVERY 12 HOURS PRN
Status: DISCONTINUED | OUTPATIENT
Start: 2019-12-31 | End: 2020-01-02 | Stop reason: HOSPADM

## 2019-12-31 RX ORDER — DIPHENHYDRAMINE HCL 25 MG
25 CAPSULE ORAL EVERY 6 HOURS PRN
Status: DISCONTINUED | OUTPATIENT
Start: 2019-12-31 | End: 2020-01-02 | Stop reason: HOSPADM

## 2019-12-31 RX ORDER — IBUPROFEN 800 MG/1
800 TABLET, FILM COATED ORAL EVERY 6 HOURS PRN
Status: DISCONTINUED | OUTPATIENT
Start: 2019-12-31 | End: 2020-01-02 | Stop reason: HOSPADM

## 2019-12-31 RX ORDER — SODIUM CHLORIDE, SODIUM LACTATE, POTASSIUM CHLORIDE, CALCIUM CHLORIDE 600; 310; 30; 20 MG/100ML; MG/100ML; MG/100ML; MG/100ML
INJECTION, SOLUTION INTRAVENOUS CONTINUOUS PRN
Status: DISCONTINUED | OUTPATIENT
Start: 2019-12-31 | End: 2019-12-31

## 2019-12-31 RX ORDER — KETOROLAC TROMETHAMINE 30 MG/ML
INJECTION, SOLUTION INTRAMUSCULAR; INTRAVENOUS PRN
Status: DISCONTINUED | OUTPATIENT
Start: 2019-12-31 | End: 2019-12-31

## 2019-12-31 RX ORDER — OXYCODONE HYDROCHLORIDE 5 MG/1
5-10 TABLET ORAL
Status: DISCONTINUED | OUTPATIENT
Start: 2019-12-31 | End: 2020-01-02 | Stop reason: HOSPADM

## 2019-12-31 RX ADMIN — ACETAMINOPHEN 975 MG: 325 TABLET, FILM COATED ORAL at 23:52

## 2019-12-31 RX ADMIN — CEFAZOLIN SODIUM 2 G: 2 INJECTION, SOLUTION INTRAVENOUS at 03:22

## 2019-12-31 RX ADMIN — BUPIVACAINE HYDROCHLORIDE 20 ML: 2.5 INJECTION, SOLUTION EPIDURAL; INFILTRATION; INTRACAUDAL at 04:25

## 2019-12-31 RX ADMIN — KETOROLAC TROMETHAMINE 30 MG: 30 INJECTION, SOLUTION INTRAMUSCULAR at 13:18

## 2019-12-31 RX ADMIN — Medication 100 ML/HR: at 07:50

## 2019-12-31 RX ADMIN — FENTANYL CITRATE 10 MCG: 50 INJECTION, SOLUTION INTRAMUSCULAR; INTRAVENOUS at 03:14

## 2019-12-31 RX ADMIN — PHENYLEPHRINE HYDROCHLORIDE 200 MCG: 10 INJECTION INTRAVENOUS at 04:05

## 2019-12-31 RX ADMIN — ONDANSETRON 4 MG: 2 INJECTION INTRAMUSCULAR; INTRAVENOUS at 03:39

## 2019-12-31 RX ADMIN — ONDANSETRON 4 MG: 2 INJECTION INTRAMUSCULAR; INTRAVENOUS at 07:50

## 2019-12-31 RX ADMIN — Medication 50 MCG/MIN: at 03:15

## 2019-12-31 RX ADMIN — PHENYLEPHRINE HYDROCHLORIDE 200 MCG: 10 INJECTION INTRAVENOUS at 03:58

## 2019-12-31 RX ADMIN — SENNOSIDES AND DOCUSATE SODIUM 2 TABLET: 8.6; 5 TABLET ORAL at 19:53

## 2019-12-31 RX ADMIN — MORPHINE SULFATE 0.15 MG: 1 INJECTION, SOLUTION EPIDURAL; INTRATHECAL; INTRAVENOUS at 03:14

## 2019-12-31 RX ADMIN — PHENYLEPHRINE HYDROCHLORIDE 200 MCG: 10 INJECTION INTRAVENOUS at 03:48

## 2019-12-31 RX ADMIN — SODIUM CHLORIDE, POTASSIUM CHLORIDE, SODIUM LACTATE AND CALCIUM CHLORIDE: 600; 310; 30; 20 INJECTION, SOLUTION INTRAVENOUS at 03:08

## 2019-12-31 RX ADMIN — PHENYLEPHRINE HYDROCHLORIDE 100 MCG: 10 INJECTION INTRAVENOUS at 03:42

## 2019-12-31 RX ADMIN — BUPIVACAINE 20 ML: 13.3 INJECTION, SUSPENSION, LIPOSOMAL INFILTRATION at 04:25

## 2019-12-31 RX ADMIN — AZITHROMYCIN MONOHYDRATE 500 MG: 500 INJECTION, POWDER, LYOPHILIZED, FOR SOLUTION INTRAVENOUS at 03:22

## 2019-12-31 RX ADMIN — KETOROLAC TROMETHAMINE 30 MG: 30 INJECTION, SOLUTION INTRAMUSCULAR at 19:52

## 2019-12-31 RX ADMIN — SODIUM CITRATE AND CITRIC ACID MONOHYDRATE 30 ML: 500; 334 SOLUTION ORAL at 02:47

## 2019-12-31 RX ADMIN — BUPIVACAINE HYDROCHLORIDE IN DEXTROSE 1.6 ML: 7.5 INJECTION, SOLUTION SUBARACHNOID at 03:14

## 2019-12-31 RX ADMIN — OXYTOCIN-SODIUM CHLORIDE 0.9% IV SOLN 30 UNIT/500ML 600 ML/HR: 30-0.9/5 SOLUTION at 03:37

## 2019-12-31 RX ADMIN — ACETAMINOPHEN 975 MG: 325 TABLET, FILM COATED ORAL at 15:56

## 2019-12-31 RX ADMIN — PHENYLEPHRINE HYDROCHLORIDE 100 MCG: 10 INJECTION INTRAVENOUS at 03:46

## 2019-12-31 RX ADMIN — KETOROLAC TROMETHAMINE 30 MG: 30 INJECTION, SOLUTION INTRAMUSCULAR at 04:02

## 2019-12-31 RX ADMIN — SODIUM CHLORIDE, POTASSIUM CHLORIDE, SODIUM LACTATE AND CALCIUM CHLORIDE: 600; 310; 30; 20 INJECTION, SOLUTION INTRAVENOUS at 01:24

## 2019-12-31 RX ADMIN — PHENYLEPHRINE HYDROCHLORIDE 100 MCG: 10 INJECTION INTRAVENOUS at 03:39

## 2019-12-31 NOTE — ANESTHESIA CARE TRANSFER NOTE
Patient: Sagrario Meyer    Procedure(s):   SECTION    Diagnosis: * No pre-op diagnosis entered *  Diagnosis Additional Information: No value filed.    Anesthesia Type:   MAC, Spinal, Peripheral Nerve Block, For Post-op pain in coordination with surgeon     Note:  Airway :Room Air  Patient transferred to:PACU  Comments: VSS. Breathing spontaneously at a regular rate with adequate tidal volumes and maintaining O2 sats on 6L facemask. Denies nausea or pain. No apparent complications from anesthesia.     Joon Tanner DO  CA-3  Handoff Report: Identifed the Patient, Identified the Reponsible Provider, Reviewed the pertinent medical history, Discussed the surgical course, Reviewed Intra-OP anesthesia mangement and issues during anesthesia, Set expectations for post-procedure period and Allowed opportunity for questions and acknowledgement of understanding      Vitals: (Last set prior to Anesthesia Care Transfer)    CRNA VITALS  2019 0342 - 2019 0427      2019             NIBP:  (!) 135/109    Pulse:  104    NIBP Mean:  117    SpO2:  96 %                Electronically Signed By: Joon Tanner DO  2019  4:27 AM

## 2019-12-31 NOTE — ANESTHESIA PREPROCEDURE EVALUATION
Anesthesia Pre-Procedure Evaluation    Patient: Sagrario Meyer   MRN:     [id number] Gender:   female   Age:    39 year old :      1980        Preoperative Diagnosis: * No surgery found *        Past Medical History:   Diagnosis Date     Latent tuberculosis 2016    received 9 months of INH      Past Surgical History:   Procedure Laterality Date     left leg abscess      [facility] BREAST CYST ASP ADDL LEFT Left     [facility]          Anesthesia Evaluation       history and physical reviewed .      No history of anesthetic complications          ROS/MED HX    ENT/Pulmonary:  - neg pulmonary ROS     Neurologic:  - neg neurologic ROS     Cardiovascular:  - neg cardiovascular ROS       METS/Exercise Tolerance:     Hematologic:         Musculoskeletal:         GI/Hepatic:  - neg GI/hepatic ROS       Renal/Genitourinary:         Endo:         Psychiatric:         Infectious Disease:         Malignancy:         Other:                         PHYSICAL EXAM:   Mental Status/Neuro: A/A/O   Airway: Facies: Feasible  Mallampati: II  Mouth/Opening: Full  TM distance: > 6 cm  Neck ROM: Full   Respiratory: Auscultation: CTAB     Resp. Rate: Normal     Resp. Effort: Normal      CV: Rhythm: Regular  Rate: Age appropriate  Heart: Normal Sounds  Edema: None   Comments:      Dental: Normal Dentition                LABS:  CBC:   Lab Results   Component Value Date    WBC 10.4 2019    HGB 12.7 2019    HGB 11.1 (L) 10/28/2019    HCT 38.6 2019    HCT 35.7 10/28/2019     2019     BMP:   Lab Results   Component Value Date     2018    POTASSIUM 3.8 2018    CHLORIDE 108 (H) 2018    BUN 4 (L) 2018    CR 0.61 2018    GLC 85 2018     COAGS: No results found for: PTT, INR, FIBR  POC:   Lab Results   Component Value Date    HCG POSITIVE (A) 2019     OTHER:   Lab Results   Component Value Date    KETTY 9.4 2018        Preop Vitals    BP Readings from  "Last 3 Encounters:   12/30/19 123/64   12/18/19 108/68   12/12/19 103/70    Pulse Readings from Last 3 Encounters:   12/28/19 75   12/18/19 95   12/12/19 58      Resp Readings from Last 3 Encounters:   12/30/19 16   12/12/19 16   11/25/19 16    SpO2 Readings from Last 3 Encounters:   09/30/19 99%   08/26/19 100%   08/20/19 98%      Temp Readings from Last 1 Encounters:   12/30/19 36.7  C (98  F)    Ht Readings from Last 1 Encounters:   12/18/19 1.626 m (5' 4\")      Wt Readings from Last 1 Encounters:   12/18/19 75.4 kg (166 lb 3.2 oz)    Estimated body mass index is 28.53 kg/m  as calculated from the following:    Height as of 12/18/19: 1.626 m (5' 4\").    Weight as of 12/18/19: 75.4 kg (166 lb 3.2 oz).     LDA:  Peripheral IV 12/28/19 Right Lower forearm (Active)   Site Assessment WDL 12/30/2019  6:30 PM   Line Status Infusing 12/30/2019  6:30 PM   Phlebitis Scale 0-->no symptoms 12/30/2019  6:30 PM   Infiltration Scale 0 12/30/2019  6:30 PM   Dressing Intervention New dressing  12/30/2019  5:00 PM   Number of days: 2        Assessment:   ASA SCORE: 2    H&P: History and physical reviewed and following examination; no interval change.         Plan:   Anes. Type:  MAC; Spinal; Peripheral Nerve Block; For Post-op pain in coordination with surgeon     Block Details: TAP; Exparel; Single Shot   Pre-Medication: None   Induction:  N/a   Airway: Native Airway   Access/Monitoring: PIV   Maintenance: N/a     Postop Plan:   Postop Pain: Regional  Postop Sedation/Airway: Not planned  Disposition: Inpatient/Admit     PONV Management: Adult Risk Factors: Female   Prevention: Ondansetron     CONSENT: Direct conversation   Plan and risks discussed with: Patient   Blood Products: Consented (ALL Blood Products)       Comments for Plan/Consent:  Patient had epidural placed for labor analgesia.  When decision made to proceed with C section for arrest of dilation, patients epidural one sided.  Epidural stopped, PCEA function disabled " knowing it would be an hour and a half before getting into OR as laceration repair going first.  Will perform a single shot spinal for anesthesia for the case.           neg OB KAYE Dupree Ciro, DO

## 2019-12-31 NOTE — DISCHARGE SUMMARY
"[facility] Discharge Summary    Sagrario Meyer MRN# [id number]   Age: 39 year old YOB: 1980     Date of Admission:  2019  Date of Discharge::  2020  Admitting Physician:  ASHLEY Alfaro CN  Discharge Physician:  Saundra Lanza MD             Admission Diagnoses:   - at 39w3d  -Induction of labor AMA   -Polyhydramnios  -Advanced maternal age           Discharge Diagnosis:   Same, delivered   -Arrest of dilation  -Acute blood loss anemia         Procedures:   Procedure(s): Primary low-transverse  section with double layer uterine closure via Pfannenstiel incision  TAP block  Epidural   Spinal                 Medications Prior to Admission:     Medications Prior to Admission   Medication Sig Dispense Refill Last Dose     Prenatal Vit-Fe Fumarate-FA (PRENATAL MULTIVITAMIN W/IRON) 27-0.8 MG tablet Take 1 tablet by mouth daily 100 tablet 3 2019 at Unknown time     vitamin D3 (CHOLECALCIFEROL) 2000 units tablet Take 1 tablet by mouth daily 100 tablet 3 2019 at Unknown time     hydrocortisone 2.5 % cream Apply to face 2 to 3 times a week. From Dermatology.   More than a month at Unknown time     order for DME Equipment being ordered: Double electric Breast Pump Medela brand. (Patient not taking: Reported on 2019) 1 Device 0 Not Taking     order for DME Equipment being ordered: Pregnancy support belt.  Ht Readings:  19 : 1.626 m (5' 4\")  Wt Readings:  19 : 62.4 kg (137 lb 9.6 oz) (Patient not taking: Reported on 2019) 1 Device 0 Not Taking     [DISCONTINUED] acetaminophen (TYLENOL) 500 MG tablet Take 1-2 tablets (500-1,000 mg) by mouth every 8 hours as needed for mild pain (Patient not taking: Reported on 2019) 100 tablet 11 More than a month at Unknown time     [DISCONTINUED] alum & mag hydroxide-simethicone (MAALOX ADVANCED MAX ST) 400-400-40 MG/5ML SUSP suspension Take 30 mLs by mouth 3 times daily as needed for heartburn " (Patient not taking: Reported on 11/25/2019) 769 mL 11 More than a month at Unknown time     [DISCONTINUED] ondansetron (ZOFRAN-ODT) 4 MG ODT tab Take 1 tablet (4 mg) by mouth every 8 hours as needed for nausea (Patient not taking: Reported on 11/25/2019) 20 tablet 0 More than a month at Unknown time     [DISCONTINUED] ranitidine (ZANTAC) 150 MG tablet Take 1 tablet (150 mg) by mouth 2 times daily as needed for heartburn (Patient not taking: Reported on 11/25/2019) 60 tablet 3 More than a month at Unknown time             Discharge Medications:        Review of your medicines      START taking      Dose / Directions   ibuprofen 600 MG tablet  Commonly known as:  ADVIL/MOTRIN      Dose:  600 mg  Take 1 tablet (600 mg) by mouth every 6 hours as needed for moderate pain Start after delivery  Quantity:  60 tablet  Refills:  0     oxyCODONE 5 MG tablet  Commonly known as:  ROXICODONE      Dose:  5 mg  Take 1 tablet (5 mg) by mouth every 6 hours as needed for pain  Quantity:  5 tablet  Refills:  0     senna-docusate 8.6-50 MG tablet  Commonly known as:  SENOKOT-S/PERICOLACE      Dose:  1 tablet  Take 1 tablet by mouth daily Start after delivery.  Quantity:  100 tablet  Refills:  0        CONTINUE these medicines which may have CHANGED, or have new prescriptions. If we are uncertain of the size of tablets/capsules you have at home, strength may be listed as something that might have changed.      Dose / Directions   acetaminophen 325 MG tablet  Commonly known as:  TYLENOL  This may have changed:      medication strength    how much to take    when to take this    additional instructions      Dose:  650 mg  Take 2 tablets (650 mg) by mouth every 6 hours as needed for mild pain Start after Delivery.  Quantity:  100 tablet  Refills:  0        CONTINUE these medicines which have NOT CHANGED      Dose / Directions   hydrocortisone 2.5 % cream      Apply to face 2 to 3 times a week. From Dermatology.  Refills:  0     order for  "DME  Used for:  Supervision of high-risk pregnancy of elderly multigravida      Equipment being ordered: Pregnancy support belt.  Ht Readings:  19 : 1.626 m (5' 4\")  Wt Readings:  19 : 62.4 kg (137 lb 9.6 oz)  Quantity:  1 Device  Refills:  0     order for DME  Used for:  Supervision of high-risk pregnancy of elderly multigravida      Equipment being ordered: Double electric Breast Pump Medela brand.  Quantity:  1 Device  Refills:  0     prenatal multivitamin w/iron 27-0.8 MG tablet      Dose:  1 tablet  Take 1 tablet by mouth daily  Quantity:  100 tablet  Refills:  3     vitamin D3 2000 units (50 mcg) tablet  Commonly known as:  CHOLECALCIFEROL  Used for:  Vitamin D deficiency      Dose:  1 tablet  Take 1 tablet by mouth daily  Quantity:  100 tablet  Refills:  3        STOP taking    alum & mag hydroxide-simethicone 400-400-40 MG/5ML Susp suspension  Commonly known as:  Maalox Advanced Max St        ondansetron 4 MG ODT tab  Commonly known as:  ZOFRAN-ODT        ranitidine 150 MG tablet  Commonly known as:  ZANTAC              Where to get your medicines      These medications were sent to [facility] [address]  [address] [address]    Phone:  [phone]     acetaminophen 325 MG tablet    ibuprofen 600 MG tablet    senna-docusate 8.6-50 MG tablet     Some of these will need a paper prescription and others can be bought over the counter. Ask your nurse if you have questions.    Bring a paper prescription for each of these medications    oxyCODONE 5 MG tablet               Consultations:   Anesthesia, Lactation           Brief Admission History:     39w3d    Sagrario Meyer is a 39 year old female with an Patient's last menstrual period was 2019 (approximate). and Estimated Date of Delivery: Dec 30, 2019 [facility] at 4:47 PM with for induction of labor.  Indication: AMA at term and " polyhydramnios.        Intrapartum History:     Sagrario Meyer is a 39 year old  at 40w1d admitted for induction of labor for AMA and polyhydramnios. Patient received misoprostol both oral and vaginal, cervidil, law. Following cervical ripening she was started on IV pitocin, labor augmented with AROM. Patient had protracted labor course. She eventually progressed to 6 cm. At that point she did not have any further cervical dilation for >6 hours. She therefore met criteria for arrest of dilation. Recommended proceeding with  section.  The risks, benefits, and alternatives of  section were discussed with the patient, and she agreed to proceed.        Intraoperative course     The procedure was uncomplicated.   mL.  See operative report for details.        Postpartum Course     The patient's hospital course was unremarkable.  She recovered as anticipated and experienced no post-operative complications. On discharge, her pain was well controlled. Vaginal bleeding is similar to peak menstrual flow.  Voiding without difficulty.  Ambulating well and tolerating a normal diet.  No fever or significant wound drainage.  Breastfeeding and bottlefeeding.  Infant is stable.  No bowel movement yet.  She was discharged on post-partum day #2.    Post-partum hemoglobin:   Hemoglobin   Date Value Ref Range Status   2020 11.3 (L) 11.7 - 15.7 g/dL Final             Discharge Instructions and Follow-Up:   Discharge diet: Regular   Discharge activity: No lifting greater than 20 lbs, pushing, pulling, or other strenuous activity for 6 weeks. Pelvic rest for 6 weeks including no sexual intercourse, tampons, or douching. No driving until you can slam on the breaks without pain or while on narcotic pain medications.    Discharge follow-up: Follow up with primary OB for routine postpartum visit in 6 weeks   Wound care: Keep incision clean and dry           Discharge Disposition:   Discharged to home         Yulissa Ramos MD  OBGYN PGY-2  1/2/2020 2:42 PM     Appreciate note by Dr. Ramos. Patient has been seen and examined by me separate from the resident, agree with above note.     Saundra Lanza MD  9:06 AM

## 2019-12-31 NOTE — PROVIDER NOTIFICATION
12/31/19 0030   Labor Care   Activity In Labor bedrest;other (see comments)  (RNx2 at bedside)     Myself and Analy at bedside performing rebozo maneuver to try to get baby to move from OP position.

## 2019-12-31 NOTE — PROVIDER NOTIFICATION
19 2330   Provider Notification   Provider Name/Title ALIZE Hughes   Method of Notification At Bedside   Request Evaluate in Person   Notification Reason Labor Status     Provider at bedside to perform US to assess baby's position. Suggests position changes to move baby from OP to help progress labor. Discussed recheck at 0130 and possible  if no progression is made. Pt agrees with plan at this time.

## 2019-12-31 NOTE — PROGRESS NOTES
Pt again unchanged prior to proceeding to c/s.  6 cm with tight pubic arch.     Consent signed and witnessed. Pt ready to proceed with c/s.     Hemoglobin   Date Value Ref Range Status   12/26/2019 12.7 11.7 - 15.7 g/dL Final   Type and screen current.  Anesthesia aware and plans spinal for anesthesia.     Mya Kasper MD, FACOG  [facility] Staff  OB/GYN    12/31/2019  2:42 AM

## 2019-12-31 NOTE — PROGRESS NOTES
Blood pressure 123/64, pulse 75, temperature 98.3  F (36.8  C), temperature source Oral, resp. rate 16, last menstrual period 03/25/2019, SpO2 97 %, not currently breastfeeding.  Patient Vitals for the past 24 hrs:   BP Temp Temp src Resp SpO2   12/30/19 2107 123/64 -- -- 16 --   12/30/19 2105 116/63 -- -- 16 --   12/30/19 2103 118/64 -- -- 16 --   12/30/19 2101 129/67 -- -- 16 97 %   12/30/19 2100 -- 98.3  F (36.8  C) Oral 16 --   12/30/19 2059 124/68 -- -- 16 --   12/30/19 2057 124/67 -- -- 16 --   12/30/19 2055 118/68 -- -- 16 --   12/30/19 2053 124/66 -- -- 16 --   12/30/19 2051 120/65 -- -- 16 97 %   12/30/19 2049 124/70 -- -- 16 --   12/30/19 2047 130/73 -- -- 16 --   12/30/19 2045 124/67 -- -- 18 --   12/30/19 2043 132/65 -- -- 20 --   12/30/19 2041 124/60 -- -- 20 97 %   12/30/19 1927 -- 98  F (36.7  C) -- -- --   12/30/19 1921 135/65 -- -- 20 --   12/30/19 1823 123/64 97.9  F (36.6  C) Oral 16 --   12/30/19 1735 127/63 98.1  F (36.7  C) Oral 16 --   12/30/19 1700 120/69 98.3  F (36.8  C) Oral 16 --   12/30/19 1555 120/64 98.2  F (36.8  C) Oral 16 --   12/30/19 1215 105/53 98.7  F (37.1  C) Oral 16 --   12/30/19 1000 110/55 98.1  F (36.7  C) Oral 16 --   12/30/19 0615 136/77 97.7  F (36.5  C) Oral 18 --   12/30/19 0309 114/53 98.6  F (37  C) Oral 18 --   12/29/19 2249 116/62 97.6  F (36.4  C) Oral 20 --     General appearance: Pt up in bathroom with RN. Getting out of tub.  Desires epidural.  CONTACTIONS: every 3-5 minutes  Pitocin- 13 mu/min.,  Antibiotics- none  FETAL HEART TONES: continuous EFM- baseline 140 with moderate variability and positive accelerations. No decelerations.  ROM: clear fluid  PELVIC EXAM: deferred    # Pain Assessment:  Current Pain Score 12/30/2019   Patient currently in pain? denies   Pain descriptors -   Sagrario s pain level was assessed and she currently denies pain.      ASSESSMENT:  ==============  IUP @ 40w0d IOL maternal age 39, polyhydramnios   Fetal Heart Rate Tracing  category one  GBS- negative    S/p PO miso x 6, PV miso x 2  S/p cervidil  S/p law balloon    Pitocin started 12/29 @ 0600  Pitocin off 12/30 @ 0735  Restarted 12/30 @ 1045, @ 13mu currently    Patient Active Problem List   Diagnosis     Cystic acne     Latent tuberculosis     Redness of right eye     Chronic right-sided low back pain without sciatica     Vitamin D deficiency     AMA (advanced maternal age) multigravida 35+     Subserous leiomyoma of uterus     Supervision of high risk pregnancy BILLIE SHIPLEY care     Multigravida of advanced maternal age in third trimester     Labor and delivery, indication for care     PLAN:  ===========  Anesthesia paged and provider to provider report given.   Continue to monitor fluid color, maternal temperature, fhr, s/s of triple I.  Prepare for epidural placement.    ASHLEY Hussein CNM     Addendum @ 2100:  Epidural placed. Patient feeling more comfortable.  Consider placement of IUPC at next cervical exam.    ASHLEY Hussein CNM

## 2019-12-31 NOTE — ANESTHESIA PROCEDURE NOTES
Peripheral Nerve Block Procedure Note    Staff:     Anesthesiologist:  Damaso Alcantar MD    Resident/CRNA:  Joon Tanner DO    Block performed by resident/CRNA in the presence of a teaching physician    Location: OB  Procedure Start/Stop TImes:      12/31/2019 4:20 AM     12/31/2019 4:26 AM    patient identified, IV checked, site marked, risks and benefits discussed, informed consent, monitors and equipment checked, pre-op evaluation, at physician/surgeon's request and post-op pain management      Correct Patient: Yes      Correct Position: Yes      Correct Site: Yes      Correct Procedure: Yes      Correct Laterality:  Yes    Site Marked:  Yes  Procedure details:     Procedure:  TAP    ASA:  2    Diagnosis:  Post op pain control    Laterality:  Bilateral    Position:  Supine    Sterile Prep: chloraprep, patient draped, mask and sterile gloves      Local skin infiltration:  None    Needle:  Short bevel    Needle gauge:  21    Needle length (inches):  3.13    Ultrasound: Yes      Ultrasound used to identify targeted nerve, plexus, or vascular structure and placed a needle adjacent to it      Permanent Image entered into patiient's record      Abnormal pain on injection: No      Blood Aspirated: No      Paresthesias:  No    Bleeding at site: No      Bolus via:  Needle    Infusion Method:  Single Shot    Complications:  None

## 2019-12-31 NOTE — ANESTHESIA PROCEDURE NOTES
Epidural Procedure Note    Staff:     Anesthesiologist:  Damaso Alcantar MD    Resident/CRNA:  Joon Tanner DO    Procedure performed by resident/CRNA in the presence of a teaching physician    Location: OB     Procedure start time:  12/30/2019 8:30 AM     Procedure end time:  12/30/2019 8:38 AM   Pre-procedure checklist:   patient identified, IV checked, site marked, risks and benefits discussed, informed consent, monitors and equipment checked, pre-op evaluation, at physician/surgeon's request and post-op pain management      Correct Patient: Yes      Correct Position: Yes      Correct Site: Yes      Correct Procedure: Yes      Correct Laterality:  N/A    Site Marked:  Yes  Procedure:     Procedure:  Epidural catheter    ASA:  2    Diagnosis:  Intrauterine Pregnancy    Position:  Sitting    Sterile Prep: chloraprep      Insertion site:  L3-4    Local skin infiltration:  2% lidocaine    amount (mL):  3    Approach:  Midline    Needle gauge (G):  17    Needle Length (in):  3.5    Block Needle Type:  Touhy    Injection Technique:  LORT saline    SHANNAN at (cm):  4    Attempts:  1    Redirects:  0    Catheter gauge (G):  19    Catheter threaded easily: Yes      Threaded to cm at skin:  8    Threaded in epidural space (cm):  4    Paresthesias:  Yes and Resolved    Aspiration negative for Heme or CSF: Yes      Test dose (mL):  3     Local anesthetic:  Lidocaine 1.5% w/ 1:200,000 epinephrine    Test dose time:  20:36    Test dose negative for signs of intravascular, subdural or intrathecal injection: Yes

## 2019-12-31 NOTE — PLAN OF CARE
Patient reporting increased pain and discomfort.  Requesting an epidural for pain relief. Dr Christopher ANDREWS called and in room at 2028. Patient and procedure correctly identified/ verified with JULIANA. Consent signed. 250 mL fluid bolus given prior to epidural placement.  Epidural placed by JULIANA without complication. Test dose/ bolus given by JULIANA, patient tolerated well. Patient reports pain decreased after procedure.

## 2019-12-31 NOTE — PLAN OF CARE
VSS, afebrile.  Patient's labor augmented with oxytocin.  Patient very uncomfortable with contractions and requested epidural at 1958.  Pitocin turned down by half during epidural placement as patient was too uncomfortable to follow directions. Patient now comfortable after epidural and Pitocin has slowly been titrated back up; currently at 10 mu. Patient garcía 2-4 minutes apart. Last SVE cervix was 6/60/-1. Bladder emptied via st cath, patient repositioned with peanut ball. T cat I-II. Will continue to monitor.

## 2019-12-31 NOTE — PROGRESS NOTES
Blood pressure 123/64, pulse 75, temperature 97.9  F (36.6  C), temperature source Oral, resp. rate 16, last menstrual period 03/25/2019, not currently breastfeeding.    Much more uncomfortable, feels increased pressure, agreeable to SVE. Lidocaine applied > 10 minutes prior to exam  General appearance: uncomfortable with contractions  CONTRACTIONS: moderate and every 3-4 minutes  Pitocin- 13 mu/min.,  Antibiotics- none  FETAL HEART TONES: continuous EFM- baseline 140 with moderate variability and positive accelerations. No decelerations.  ROM: clear fluid  PELVIC EXAM: 6/ 80%/ -1    ASSESSMENT:  ==============  IUP @ 40w0d for induction of labor.  Indication: AMA at term   Fetal Heart Rate Tracing category one  GBS- negative    PLAN:  ===========  Ambulation, hydration, position changes, birthing ball/sling and tub options to facilitate labor.  Pain medication options reviewed with pt. Pt is interested in hydrotherapy.  Reevaluate in 2-4 hours prn  Continue labor induction with Pitocin     ASHLEY Anna CNM

## 2019-12-31 NOTE — ANESTHESIA POSTPROCEDURE EVALUATION
Anesthesia POST Procedure Evaluation    Patient: Sagrario Meyer   MRN:     [id number] Gender:   female   Age:    39 year old :      1980        Preoperative Diagnosis: * No pre-op diagnosis entered *   Procedure(s):   SECTION   Postop Comments: No value filed.       Anesthesia Type:  Not documented  MAC, Spinal, Peripheral Nerve Block, For Post-op pain in coordination with surgeon    Reportable Event: NO     PAIN: Uncomplicated   Sign Out status: Comfortable, Well controlled pain     PONV: No PONV   Sign Out status:  No Nausea or Vomiting     Neuro/Psych: Uneventful perioperative course   Sign Out Status: Preoperative baseline; Age appropriate mentation     Airway/Resp.: Uneventful perioperative course   Sign Out Status: Non labored breathing, age appropriate RR; Resp. Status within EXPECTED Parameters     CV: Uneventful perioperative course   Sign Out status: Appropriate BP and perfusion indices; Appropriate HR/Rhythm     Disposition:   Sign Out in:  Labor and Delivery  Disposition:  Labor and Delivery  Recovery Course: Uneventful  Follow-Up: Not required     Comments/Narrative:  Patient doing well post-operatively.  No significant issues.  Hemodynamically stable, pain well controlled, nausea well controlled.  Stable for discharge from the PACU             Last Anesthesia Record Vitals:  CRNA VITALS  2019 0342 - 2019 0442      2019             NIBP:  (!) 135/109    Pulse:  104    NIBP Mean:  117    SpO2:  96 %          Last PACU Vitals:  Vitals Value Taken Time   /82 2019  5:40 AM   Temp 36.5  C (97.7  F) 2019  5:30 AM   Pulse 77 2019  5:40 AM   Resp 21 2019  5:44 AM   SpO2 98 % 2019  5:44 AM   Temp src     NIBP     Pulse     SpO2     Resp     Temp     Ht Rate     Temp 2     Vitals shown include unvalidated device data.      Electronically Signed By: Damaso Alcantar MD, 2019, 5:46 AM

## 2019-12-31 NOTE — PLAN OF CARE
Data: Sagrario Meyer transferred to [id number] via cart at 0615. Baby transferred via parent's arms.  Action: Receiving unit notified of transfer: Yes. Patient and family notified of room change. Settled into room with Melissa RN, and will give report to day RN at 0715. Belongings sent to receiving unit. Accompanied by Registered Nurse. Oriented patient to surroundings. Call light within reach. ID bands double-checked with receiving RN.  Response: Patient tolerated transfer and is stable.

## 2019-12-31 NOTE — ANESTHESIA PROCEDURE NOTES
Spinal/LP Procedure Note    Spinal Block  Staff:     Anesthesiologist:  Damaso Alcantar MD    Resident/CRNA:  Joon Tanner DO    Spinal/LP performed by resident/CRNA in presence of a teaching physician.    Location: In OR BEFORE Induction  Procedure Start/Stop Times:      2019 3:05 AM     2019 3:14 AM    patient identified, IV checked, site marked, risks and benefits discussed, informed consent, monitors and equipment checked, pre-op evaluation, at physician/surgeon's request and post-op pain management      Correct Patient: Yes      Correct Position: Yes      Correct Site: Yes      Correct Procedure: Yes      Correct Laterality:  Yes    Site Marked:  Yes  Procedure:     Procedure:  Intrathecal    ASA:  2    Diagnosis:      Position:  Sitting    Sterile Prep: chloraprep, mask and sterile gloves      Insertion site:  L4-5    Approach:  Midline    Needle Type:  Rebeca    Needle gauge (G):  25    Local Skin Infiltration:  2% lidocaine    amount (ml):  4    Needle Length (in):  3.5    Introducer used: Yes      Introducer gauge:  20 G    Attempts:  2    Redirects:  2    CSF:  Clear    Paresthesias:  No    Time injected:  03:14  Assessment/Narrative:     Sensory Level:  T4

## 2019-12-31 NOTE — PROGRESS NOTES
Blood pressure 109/58, pulse 75, temperature 98.4  F (36.9  C), temperature source Oral, resp. rate 16, last menstrual period 03/25/2019, SpO2 97 %, not currently breastfeeding.  Patient Vitals for the past 24 hrs:   BP Temp Temp src Resp SpO2   12/30/19 2237 109/58 98.4  F (36.9  C) Oral 16 --   12/30/19 2107 123/64 -- -- 16 --   12/30/19 2105 116/63 -- -- 16 --   12/30/19 2103 118/64 -- -- 16 --   12/30/19 2101 129/67 -- -- 16 97 %   12/30/19 2100 -- 98.3  F (36.8  C) Oral 16 --   12/30/19 2059 124/68 -- -- 16 --   12/30/19 2057 124/67 -- -- 16 --   12/30/19 2055 118/68 -- -- 16 --   12/30/19 2053 124/66 -- -- 16 --   12/30/19 2051 120/65 -- -- 16 97 %   12/30/19 2049 124/70 -- -- 16 --   12/30/19 2047 130/73 -- -- 16 --   12/30/19 2045 124/67 -- -- 18 --   12/30/19 2043 132/65 -- -- 20 --   12/30/19 2041 124/60 -- -- 20 97 %   12/30/19 1927 -- 98  F (36.7  C) -- -- --   12/30/19 1921 135/65 -- -- 20 --   12/30/19 1823 123/64 97.9  F (36.6  C) Oral 16 --   12/30/19 1735 127/63 98.1  F (36.7  C) Oral 16 --   12/30/19 1700 120/69 98.3  F (36.8  C) Oral 16 --   12/30/19 1555 120/64 98.2  F (36.8  C) Oral 16 --   12/30/19 1215 105/53 98.7  F (37.1  C) Oral 16 --   12/30/19 1000 110/55 98.1  F (36.7  C) Oral 16 --   12/30/19 0615 136/77 97.7  F (36.5  C) Oral 18 --   12/30/19 0309 114/53 98.6  F (37  C) Oral 18 --     General appearance: Pt comfortable with epidural. Agreeable to sve.   CONTRACTIONS: every 2-4 minutes  Pitocin- 10 mu/min.,  Antibiotics- none  FETAL HEART TONES: continuous EFM- baseline 150 with moderate variability and periods of minimal variability. Rare late and variable decelerations. Positive scalp stimulation.   ROM: clear fluid  PELVIC EXAM: 6/90/-2, mid/soft    IUPC placed with pt consent after review of r/b/a    # Pain Assessment:  Current Pain Score 12/30/2019   Patient currently in pain? denies   Pain descriptors -   Sagrario s pain level was assessed and she currently denies pain.       ASSESSMENT:  ==============  IUP @ 40w0d IOL maternal age 40, polyhydramnios   Fetal Heart Rate Tracing category one  GBS- negative     S/p PO miso x 6, PV miso x 2  S/p cervidil  S/p law balloon     Pitocin started  @ 0600  Pitocin off  @ 0735  Restarted  @ 1045, @ 10mu currently    Epidural in place    Patient Active Problem List   Diagnosis     Cystic acne     Latent tuberculosis     Redness of right eye     Chronic right-sided low back pain without sciatica     Vitamin D deficiency     AMA (advanced maternal age) multigravida 35+     Subserous leiomyoma of uterus     Supervision of high risk pregnancy WHDYLLAN SHIPLEY care     Multigravida of advanced maternal age in third trimester     Labor and delivery, indication for care     PLAN:  ===========  IUPC placed with pt consent after review of r/b/a  Extensive discussion of concern related to no cervical change since 1448 this afternoon.   Reviewed indications for  section.  Discussed increased risks with long induction, including but not limited to infection, postpartum bleeding.  Reviewed labor dystocia algorithm. Will evaluate MVUs, consult with MD, recheck cervix in next 1-2 hours.     ASHLEY Hussein, LINDA

## 2019-12-31 NOTE — LACTATION NOTE
This note was copied from a baby's chart.  Consult for: First time breastfeeding, concern for possible tongue tie    History:   delivery @ 40w1d, AGA infant @ 8# 3 oz. birthweight, less than 24 hours. Maternal history of large fibroid, cystic acne, AMA @ 40 y/o, labor induced for AMA and polyhydramnios.     Breast exam of mom: Soft, symmetric with intact, everted nipples bilaterally.  Sagrario reports bilateral breast growth during pregnancy.     Oral exam of baby: Normal arch, dimpling in at tongue tip with lift and extension visible with crying, able to lift about 1/3 distance of wide open mouth. Palpable band under tongue with lingual frenulum but unable to elicit suck, he was disorganized with biting and thrusting tongue forward on finger. Visualize spontaneous tongue extension well beyond lower lip during visit.     Feeding assessment: Infant latches well but thrusts tongue forward goes from deep latch to very shallow quickly, difficulty maintaining seal in cross cradle hold, much improved and stays latched in laid back position, occasional audible swallows. Fed about 8 minutes using breast massage to keep him motivated to continue sucking then pulled off on his own. Taught mom hand expression, she return demo    Education provided: Discussed positioning & using pillows/blankets for support, anatomy of breast and infant mouth for feedings, ways to get and maintain deeper latch, breast compressions to enhance milk transfer, point out nutritive vs. non-nutritive suck and how to hear swallows, benefits of skin to skin and feeding on cue, supply and demand, benefits of and how to do breast massage & hand expression.     Plan: Frequent skin to skin, breastfeed on cue with goal of 8 to 12 feedings in 24 hours, watch for early cues. Encourage hand expressing after feedings and feed back results until milk is in. Continue to monitor for difficulty with milk transfer, possible ankyloglossia. Follow up with  outpatient lactation consultant within a week of discharge for support with first time breastfeeding, help to monitor milk transfer and infant weights. Will plan follow up visit Thursday if still inpatient.     *Please review breastfeeding log and resource list prior to discharge.

## 2019-12-31 NOTE — OP NOTE
[facility]  Full Operative Progress Note     Surgery Date:  2019    Surgeon:  Mya Kasper MD    Assistants:  Arianne Momin MD, PGY-3    Pre-op Diagnosis:    - at 40w1d  -Arrest of dilation  -Polyhydramnios  -Advanced maternal age     Post-op Diagnosis:    - Same as above s/p procedure below with delivery of liveborn male infant     Procedure: primary low-transverse  section with double layer uterine closure via pfannenstiel incision    Anesthesia: spinal and TAP block     EBL: 944 cc    IVF: 750 mL crystalloid    UOP: 450 mL clear urine at the end of the case    Drains: Law Catheter     Specimens: cord gas     Complications: None apparent    Indications:   Sagrario Meyer is a 39 year old  at 40w1d admitted for induction of labor for AMA and polyhydramnios. Patient received misoprostol both oral and vaginal, cervidil, law. Following cervical ripening she was started on IV pitocin, labor augmented with AROM. Patient had protracted labor course. She eventually progressed to 6 cm. At that point she did not have any further cervical dilation for >6 hours. She therefore met criteria for arrest of dilation. Recommended proceeding with  section.  The risks, benefits, and alternatives of  section were discussed with the patient, and she agreed to proceed.     Findings:   1. Large posterior pedunculated fundal fibroid measuring approximately 10 cm in size. Small 1-2 cm sized fibroids at base of large fibroid.  2. Right 3 cm paratubal cyst  3. No intra-abdominal adhesions   4. Clear amniotic fluid  5. Liveborn male infant in right occiput posterior  presentation. Apgars 8 at 1 minute & 9 at 5 minutes. Weight 3714g.  6. Normal uterus, fallopian tubes, and ovaries.     Procedure Details:   The patient was brought to the OR, where adequate anesthesia was administered.  She was placed in the dorsal supine position with a slight leftward tilt. She was  prepped and draped in the usual sterile fashion. A surgical time out was performed. A pfannenstiel skin incision was made with the scalpel, and carried down to the underlying fascia with sharp and blunt dissection. The fascia was incised in the midline, and the incision was extended laterally with the Lew scissors. The superior aspect of the fascia was grasped with the Kocher clamps and dissected off of the underlying rectus muscles with blunt and sharp dissection. Attention was then turned to the inferior aspect of the fascia, which was similarly dissected off of the underlying rectus muscles. The rectus muscles were  in the midline, and the peritoneum was entered bluntly, and the opening was extended with digital pressure. The bladder blade was placed. A transverse hysterotomy was made with the scalpel in the lower uterine segment, and the incision was extended with digital pressure. The infant was noted to be in the OP position. Vaginal hand was used to help elevate the head. Very narrow pelvis with head deeply impacted. Following this the infant was delivered atraumatically. The shoulders delivered easily.  No nuchal cord was noted. The cord was doubly clamped and cut, and the infant was handed off to the awaiting nursery staff. A segment of cord was cut and cord gasses obtained. The placenta was delivered with gentle traction on the umbilical cord and uterine massage. The uterus was exteriorized and cleared of all clots and debris. Small area of filmy adhesions to left anterior uterine wall, taken down with electrocautery. Uterine tone was noted to be firm with 20 units of pitocin given through the running IV and uterine massage.  The hysterotomy was closed with a running locked suture of 0 Vicryl.  The hysterotomy was then imbricated using an 0 Monocryl suture. The hysterotomy was noted to be hemostatic. The posterior cul-de-sac was suctioned and cleared of all clots and debris. The uterus was  returned to the abdomen. The pericolic gutters were suctioned and cleared of all clots and debris. The hysterotomy was reexamined and noted to be hemostatic. The fascia and rectus muscles were examined and areas of oozing were controlled with electrocautery. The fascia was closed with a running 0 Vicryl suture. The subcutaneous tissue was irrigated and areas of oozing were controlled with electrocautery. The subcutaneous tissue was less than 2 cm in thickness, and was therefore not closed. The skin was closed with 4-0 Monocryl and covered with a sterile dressing.    All sponge, needle, and instrument counts were correct. The patient tolerated the procedure well, and was transferred to recovery in stable condition. Dr. Kasper was present and scrubbed for the entirety of the procedure.     Arianne Momin MD   OB/GYN Resident PGY-3  12/31/2019 3:06 AM    Staff:  I was scrubbed and present for entire case and agree w/ above note.    Mya Kasper MD

## 2019-12-31 NOTE — BRIEF OP NOTE
[facility], [place]    Brief Operative Note    Surgery Date:  2019    Surgeon:  Mya Kasper MD    Assistants:  Arianne Momin MD, PGY-3    Pre-op Diagnosis:    - at 40w1d  -Arrest of dilation  -Polyhydramnios  -Advanced maternal age     Post-op Diagnosis:    - Same as above s/p procedure below with delivery of liveborn male infant     Procedure: primary low-transverse  section with double layer uterine closure via pfannenstiel incision    Anesthesia: spinal and TAP block     EBL: 944 cc    IVF: 750 mL crystalloid    UOP: 450 mL clear urine at the end of the case    Drains: Marrero Catheter     Specimens: cord gas     Complications: None apparent    Findings:   1. Large posterior pedunculated fundal fibroid measuring approximately 10 cm in size. Small 1-2 cm sized fibroids at base of large fibroid.  2. Right 3 cm paratubal cyst  3. No intra-abdominal adhesions   4. Clear amniotic fluid  5. Liveborn male infant in direct OP presentation. Apgars 8 at 1 minute & 9 at 5 minutes. Weight pending.  6. Normal uterus, fallopian tubes, and ovaries.     Arianne Momin MD   OB/GYN Resident PGY-3  2019 4:17 AM

## 2019-12-31 NOTE — PROGRESS NOTES
Blood pressure 109/58, pulse 75, temperature 99.2  F (37.3  C), temperature source Oral, resp. rate 16, last menstrual period 03/25/2019, SpO2 97 %, not currently breastfeeding.  Patient Vitals for the past 24 hrs:   BP Temp Temp src Resp SpO2   12/30/19 2330 -- 99.2  F (37.3  C) Oral -- --   12/30/19 2237 109/58 98.4  F (36.9  C) Oral 16 --   12/30/19 2107 123/64 -- -- 16 --   12/30/19 2105 116/63 -- -- 16 --   12/30/19 2103 118/64 -- -- 16 --   12/30/19 2101 129/67 -- -- 16 97 %   12/30/19 2100 -- 98.3  F (36.8  C) Oral 16 --   12/30/19 2059 124/68 -- -- 16 --   12/30/19 2057 124/67 -- -- 16 --   12/30/19 2055 118/68 -- -- 16 --   12/30/19 2053 124/66 -- -- 16 --   12/30/19 2051 120/65 -- -- 16 97 %   12/30/19 2049 124/70 -- -- 16 --   12/30/19 2047 130/73 -- -- 16 --   12/30/19 2045 124/67 -- -- 18 --   12/30/19 2043 132/65 -- -- 20 --   12/30/19 2041 124/60 -- -- 20 97 %   12/30/19 1927 -- 98  F (36.7  C) -- -- --   12/30/19 1921 135/65 -- -- 20 --   12/30/19 1823 123/64 97.9  F (36.6  C) Oral 16 --   12/30/19 1735 127/63 98.1  F (36.7  C) Oral 16 --   12/30/19 1700 120/69 98.3  F (36.8  C) Oral 16 --   12/30/19 1555 120/64 98.2  F (36.8  C) Oral 16 --   12/30/19 1215 105/53 98.7  F (37.1  C) Oral 16 --   12/30/19 1000 110/55 98.1  F (36.7  C) Oral 16 --   12/30/19 0615 136/77 97.7  F (36.5  C) Oral 18 --   12/30/19 0309 114/53 98.6  F (37  C) Oral 18 --     General appearance: Feeling well, support people at beside.   CONTRACTIONS: q2-3min, IUPC in place- -200  Pitocin- 10 mu/min; turned off at 0104.,  Antibiotics- none  FETAL HEART TONES: continuous EFM- baseline 145 with moderate variability, periods of minimal variability and positive accelerations. No decelerations.  ROM: clear fluid  PELVIC EXAM: unchanged exam 6/90/-1    # Pain Assessment:  Current Pain Score 12/30/2019   Patient currently in pain? denies   Pain descriptors -   Sagrario s pain level was assessed and she currently denies pain.       ASSESSMENT:  ==============  IUP @ 40w1d IOL maternal age 39, polyhydramnios   Fetal Heart Rate Tracing category one  GBS- negative     Epidural in place    S/p PO miso x 6, PV miso x 2  S/p cervidil  S/p law balloon     Pitocin started  @ 0600  Pitocin off  @ 0735  Restarted  @ 1045    OFF at 0104  Recommend c/s     Patient Active Problem List   Diagnosis     Cystic acne     Latent tuberculosis     Redness of right eye     Chronic right-sided low back pain without sciatica     Vitamin D deficiency     AMA (advanced maternal age) multigravida 35+     Subserous leiomyoma of uterus     Supervision of high risk pregnancy [facility] MD care     Multigravida of advanced maternal age in third trimester     Labor and delivery, indication for care     PLAN:  ===========  Cervical exam unchanged.   Consulted with Dr. Kasper. Agrees with recommendation for c/s.  Reviewed unchanged cervical exam with pt. Recommended  section.  Pt agreeable.   Dr. Kasper to bedside.   Pitocin turned off at 0104.  Care transferred to OB MD team.    ASHLEY Hussein, CNM

## 2019-12-31 NOTE — PROGRESS NOTES
Patient arrived to [facility] unit via zoomcart at 0635,with belongings, accompanied by family, with infant in arms.  labor RN to stay and give report to day RN writer assisted with settling patient  writer checked bands with Isamar LINDSEY. Unit and room orientation needs to be completed . Call light within arms reach; no concerns present at this time. Continue with plan of care.

## 2019-12-31 NOTE — PLAN OF CARE
VSS. Afebrile. Had a small emesis this morning. Zofran given with relief. Pt falling asleep in bed with baby. Safe sleep explained and reminded to let family help out or call RN. States family in room is tired and sleeping too. Baby placed into basinette. IVSL. Tolerating regular diet this afternoon. Toradol helping out and started on tylenol with meals this afternoon. Marrero with adequate urine output. Breast feeding with some assistance as baby having harder time latching on and is uncoordinated. Hand expressed and has good colostrum. Attempted to help mobilize out of bed and pt only able to move to edge of bed and then stated unable to do any more. Will let medications help out and will mobilize later. Will continue to monitor.

## 2019-12-31 NOTE — PLAN OF CARE
OR to PACU Transfer Note  Data: Pt to OB PACU at 0405 via cart. PIV infusing NS with pitocin without complications, law with urine to gravity vs stable, pt does not complain of pain and/or nausea.   Interventions: IV to pump, monitors and alarms on, SCD on. TAP block completed  Response: stable  Plan: Patient instructed to notify RN for pain or nausea, routine post op cares, initiate breastfeeding/pumping as soon as patient/infant able.

## 2020-01-01 LAB — HGB BLD-MCNC: 11.3 G/DL (ref 11.7–15.7)

## 2020-01-01 PROCEDURE — 36415 COLL VENOUS BLD VENIPUNCTURE: CPT | Performed by: STUDENT IN AN ORGANIZED HEALTH CARE EDUCATION/TRAINING PROGRAM

## 2020-01-01 PROCEDURE — 12000001 ZZH R&B MED SURG/OB UMMC

## 2020-01-01 PROCEDURE — 25000132 ZZH RX MED GY IP 250 OP 250 PS 637: Performed by: STUDENT IN AN ORGANIZED HEALTH CARE EDUCATION/TRAINING PROGRAM

## 2020-01-01 PROCEDURE — 25000128 H RX IP 250 OP 636: Performed by: STUDENT IN AN ORGANIZED HEALTH CARE EDUCATION/TRAINING PROGRAM

## 2020-01-01 PROCEDURE — 85018 HEMOGLOBIN: CPT | Performed by: STUDENT IN AN ORGANIZED HEALTH CARE EDUCATION/TRAINING PROGRAM

## 2020-01-01 RX ORDER — SIMETHICONE 80 MG
80 TABLET,CHEWABLE ORAL 4 TIMES DAILY
Status: DISCONTINUED | OUTPATIENT
Start: 2020-01-01 | End: 2020-01-02 | Stop reason: HOSPADM

## 2020-01-01 RX ORDER — AMOXICILLIN 250 MG
2 CAPSULE ORAL 2 TIMES DAILY
Status: DISCONTINUED | OUTPATIENT
Start: 2020-01-01 | End: 2020-01-02 | Stop reason: HOSPADM

## 2020-01-01 RX ORDER — AMOXICILLIN 250 MG
1 CAPSULE ORAL 2 TIMES DAILY
Status: DISCONTINUED | OUTPATIENT
Start: 2020-01-01 | End: 2020-01-02 | Stop reason: HOSPADM

## 2020-01-01 RX ORDER — POLYETHYLENE GLYCOL 3350 17 G/17G
17 POWDER, FOR SOLUTION ORAL DAILY
Status: DISCONTINUED | OUTPATIENT
Start: 2020-01-01 | End: 2020-01-02 | Stop reason: HOSPADM

## 2020-01-01 RX ADMIN — SENNOSIDES AND DOCUSATE SODIUM 2 TABLET: 8.6; 5 TABLET ORAL at 09:44

## 2020-01-01 RX ADMIN — IBUPROFEN 800 MG: 800 TABLET, FILM COATED ORAL at 16:49

## 2020-01-01 RX ADMIN — SIMETHICONE CHEW TAB 80 MG 80 MG: 80 TABLET ORAL at 18:35

## 2020-01-01 RX ADMIN — IBUPROFEN 800 MG: 800 TABLET, FILM COATED ORAL at 09:44

## 2020-01-01 RX ADMIN — ACETAMINOPHEN 975 MG: 325 TABLET, FILM COATED ORAL at 09:44

## 2020-01-01 RX ADMIN — POLYETHYLENE GLYCOL 3350 17 G: 17 POWDER, FOR SOLUTION ORAL at 09:44

## 2020-01-01 RX ADMIN — SIMETHICONE CHEW TAB 80 MG 80 MG: 80 TABLET ORAL at 09:44

## 2020-01-01 RX ADMIN — SENNOSIDES AND DOCUSATE SODIUM 1 TABLET: 8.6; 5 TABLET ORAL at 19:59

## 2020-01-01 RX ADMIN — SIMETHICONE CHEW TAB 80 MG 80 MG: 80 TABLET ORAL at 12:51

## 2020-01-01 RX ADMIN — IBUPROFEN 800 MG: 800 TABLET, FILM COATED ORAL at 03:08

## 2020-01-01 RX ADMIN — ACETAMINOPHEN 975 MG: 325 TABLET, FILM COATED ORAL at 17:34

## 2020-01-01 NOTE — PLAN OF CARE
VSS at this time. Post-partum assessment WDL. Pt. Taking ibuprofen and tylenol for pain relief. Pt. Was educated on oxycodone but declined at this time- maybe in the morning. Pt. Walked in room 3 times overnight and requires assistance x1. Pt. Law removed around 0300 and has voided 650mls since law removal. Pt. Is breastfeeding with assistance- baby has a possible tongue tie. Mom is working on pumping and hand expressing for baby- has good supply. Good support from family at beside. Will continue to monitor at this time.

## 2020-01-01 NOTE — PROGRESS NOTES
OB/GYN Postpartum Note    S: Feels well. Pain controlled with Tylenol and Toradol. Bleeding minimal. Small ambulation to bathroom, but no dizziness. Voiding spontaneously. Has passed flatus but no BM. Tolerating regular diet without nausea or vomiting. Denies chest pain, shortness of breath, headaches, visual changes, swelling.     O:   Vitals:    19 1604 19 1953 19 2321 20 0329   BP: 124/69 110/61 113/62 129/68   BP Location:    Left arm   Pulse: 84  90 81   Resp: 16 17 18 18   Temp: 99.1  F (37.3  C) 99.2  F (37.3  C) 98.6  F (37  C) 98.4  F (36.9  C)   TempSrc: Oral Oral Oral Oral   SpO2: 99% 99% 99% 99%     General: sitting up in bed, NAD  CV: regular rate  Resp: nonlabored breathing  Abdomen: soft, nontender, very distended and tympanic  Fundus: firm at umbilicus  Incision: c/d/i with steris  Extremities: trace edema in BLE    A: Ms. Sagrario Meyer is a 39 year old  POD #1 s/p PLTCS for arrest of dilation.    P:  Heme 2.7>  > AM pending. VSS stable as above. Will discharge with PO Fe if hgb below 10  Pain: Controlled on Tylenol and Toradol, PO oxy not used yet. Transition to Ibuprofen today  : Voiding spontaneously  GI: Scheduled bowel regimen, encourage ambulation  Feeding: Breast  Contraception: Declines ( currently in [place])  PNC: Rh pos, rubella immune - no MMR or rhogam indicated  Disposition: discharge to home POD#2-3    Discussed with Dr. Jordan.    Yoan Hoover MD MPH  OB/Gyn PGY-1  20 7:19 AM    I personally examined and evaluated Sagrario Meyer on 2020.  I discussed the patient with Dr. Hoover and agree with the presentation, exam and plan of care documented in this note with edits by me.  Sagrario is doing well, routine postop cares.  Connie Jordan MD MPH

## 2020-01-01 NOTE — PLAN OF CARE
VSS. Postpartum checks WDL. Up independently, voiding without difficulty. Hasn't started to pass gas yet, encouraged ambulation. Incisional drsg CDI, using abdominal binder for support. Pain managed with tylenol, ibuprofen, and simethicone. Breastfeeding with minimal assist for deep latch. Using breast pump and hand-expressing. Initiated formula supplementation per pt's request.

## 2020-01-01 NOTE — PROGRESS NOTES
Courtesy round: Sagrario and SERAFIN Sheets have a relative visiting who brought food. Sagrario is doing ok and reports very little pain. She is struggling with breast feeding and requests additional support. Is happy with her experience and is glad to be done with induction/labor. Reviewed f/u care.    Rachel Solis CNM

## 2020-01-01 NOTE — PLAN OF CARE
Data: Vital signs and postpartum checks WDL  Patient eating and drinking normally. Patient law is draining well with good urine output. Joy care done. Abdominal binder in place. Tolerated PCD.  Patient performing self cares and is able to care for infant. Breastfeeding on demand.  Action: Patient medicated during the shift for pain with Tylenol and Toradol with relief after 1 hour. Patient education done see education record.  Response: Positive attachment behaviors observed with infant. Support persons mother present.    Plan: Continue with the plan of care

## 2020-01-02 ENCOUNTER — TELEPHONE (OUTPATIENT)
Dept: FAMILY MEDICINE | Facility: CLINIC | Age: 40
End: 2020-01-02

## 2020-01-02 VITALS
SYSTOLIC BLOOD PRESSURE: 118 MMHG | HEART RATE: 81 BPM | RESPIRATION RATE: 18 BRPM | OXYGEN SATURATION: 99 % | DIASTOLIC BLOOD PRESSURE: 67 MMHG | TEMPERATURE: 98.2 F

## 2020-01-02 LAB
BLD PROD TYP BPU: NORMAL
BLD PROD TYP BPU: NORMAL
BLD UNIT ID BPU: 0
BLD UNIT ID BPU: 0
BLOOD PRODUCT CODE: NORMAL
BLOOD PRODUCT CODE: NORMAL
BPU ID: NORMAL
BPU ID: NORMAL
TRANSFUSION STATUS PATIENT QL: NORMAL

## 2020-01-02 PROCEDURE — 25000132 ZZH RX MED GY IP 250 OP 250 PS 637: Performed by: STUDENT IN AN ORGANIZED HEALTH CARE EDUCATION/TRAINING PROGRAM

## 2020-01-02 RX ORDER — IBUPROFEN 600 MG/1
600 TABLET, FILM COATED ORAL EVERY 6 HOURS PRN
Qty: 60 TABLET | Refills: 0 | Status: SHIPPED | OUTPATIENT
Start: 2020-01-02 | End: 2021-04-23

## 2020-01-02 RX ORDER — ACETAMINOPHEN 325 MG/1
650 TABLET ORAL EVERY 6 HOURS PRN
Qty: 100 TABLET | Refills: 0 | Status: SHIPPED | OUTPATIENT
Start: 2020-01-02 | End: 2021-04-23

## 2020-01-02 RX ORDER — OXYCODONE HYDROCHLORIDE 5 MG/1
5 TABLET ORAL EVERY 6 HOURS PRN
Qty: 5 TABLET | Refills: 0 | Status: SHIPPED | OUTPATIENT
Start: 2020-01-02 | End: 2020-01-16

## 2020-01-02 RX ORDER — AMOXICILLIN 250 MG
1 CAPSULE ORAL DAILY
Qty: 100 TABLET | Refills: 0 | Status: SHIPPED | OUTPATIENT
Start: 2020-01-02 | End: 2021-04-23

## 2020-01-02 RX ADMIN — ACETAMINOPHEN 975 MG: 325 TABLET, FILM COATED ORAL at 01:32

## 2020-01-02 RX ADMIN — IBUPROFEN 800 MG: 800 TABLET, FILM COATED ORAL at 16:17

## 2020-01-02 RX ADMIN — SIMETHICONE CHEW TAB 80 MG 80 MG: 80 TABLET ORAL at 10:08

## 2020-01-02 RX ADMIN — SENNOSIDES AND DOCUSATE SODIUM 2 TABLET: 8.6; 5 TABLET ORAL at 10:08

## 2020-01-02 RX ADMIN — IBUPROFEN 800 MG: 800 TABLET, FILM COATED ORAL at 01:32

## 2020-01-02 RX ADMIN — IBUPROFEN 800 MG: 800 TABLET, FILM COATED ORAL at 10:07

## 2020-01-02 RX ADMIN — ACETAMINOPHEN 975 MG: 325 TABLET, FILM COATED ORAL at 10:05

## 2020-01-02 NOTE — PLAN OF CARE
Data: Vital signs and postpartum checks WDL  Patient eating and drinking normally. Patient able to empty bladder independently and is up ambulating.  Patient performing self cares and is able to care for infant. Breastfeeding on demand and also supplementing with formula. Pumping independently.  Action: Patient medicated during the shift for pain with Tylenol and Ibuprofen with relief after 1 hour. Patient education done see education record.  Response: Positive attachment behaviors observed with infant. Support persons SO present.    Plan: Continue with the plan of care

## 2020-01-02 NOTE — PROGRESS NOTES
Courtesy Visit by LINDA    Pt is feeling well. Baby is sleeping at breast currently. States she has limited pain when up walking, more when repositioning in bed and getting up. Started formula supplementing yesterday because she did not feel like baby was satisfied with the amount of milk he was getting. Feels well supported. Denies anxiety about going home, especially now that she is formula supplementing.     ASHLEY Rg, LINDA

## 2020-01-02 NOTE — PLAN OF CARE
Data: POD1. Fundus firm, @ U/1. Scant lochia. Vitals stable. Ambulating well, voiding without difficulty.   Incision: CDI    Tolerating diet, BM no, Flatus yes  Pain controlled with Ibuprofen and Tylenol  Breastfeeding well and independent. Supplementing with formula, maternal choice  No emotional concerns.

## 2020-01-02 NOTE — PLAN OF CARE
Data: Vital signs stable, assessments within normal limits. Discharge instruction given and instructed of signs/symptoms to look for and report per discharge instructions.   Discharge outcomes on care plan met. No apparent pain. Patient has her own pump.  Action: Review of care plan, teaching, and discharge instructions done with patient. verification with signature obtained.   Response: patient  states understanding and comfort with self cares. All questions about self care addressed. patient discharged with infant  at 1550.

## 2020-01-02 NOTE — LACTATION NOTE
This note was copied from a baby's chart.  Follow Up Consult    Maternal Assessment: Sagrario's milk is coming in and she has some mild engorgement today.    Infant Assessment:  Infant has age appropriate output. His weight loss at 48 hours of age was -6.6% of his birthweight at 7lb 10.4oz.    Feeding History: Infant was formula fed overnight, but now mother feeling engorged and milk is in.    Feeding Assessment: Infant latched on the left side in the cross cradle position when I entered the room. Prior to latching, Sagrario had done some breast massage to soften breast.  Sagrario is able to position and latch infant with minimal assist. Infant swallowing frequently during feeding and Sagrario found she was leaking milk from the opposite breast during feeding.  Sagrario feels that her breasts are much softer after feedings.     Education: Engorgement management techniques, signs/symptoms of mastitis, benefits of frequent breast feeding/breast emptying, risk of non-medically indicated supplementation, hunger/satiety cues, and outpatient lactation support.    Plan: Continue breastfeeding on cue with a goal of at least 8-12 feedings per day. Continue Ibuprofen as ordered. Continue breast massage, hand expression and brief periods of moist heat (shower, washcloth) to soften breast before feeding. Continue ice/cool packs to breast after each feeding.  Sagrario plans to follow up at  [facility]. She was encouraged to make an appointment with the LC there within 1 week of discharge due to early feeding challenges and engorgement.

## 2020-01-02 NOTE — TELEPHONE ENCOUNTER
[facility] phone call message- general phone call:    Reason for call:     Pt states she needs to talk to CÉSAR Ncik regarding her appointment and other matters.     Action desired:     Call back.     Return call needed: Yes    OK to leave a message on voice mail? Yes    Advised patient to response may take up to 2 business days: Yes    Primary language: English      needed? No    Call taken on January 2, 2020 at 8:31 AM by Dora Chamberlain CMA

## 2020-01-02 NOTE — PROGRESS NOTES
Post  Anesthesia Follow Up Note    Patient: Sagrario Meyer    Chief complaint: Acute postoperative pain managment    Procedure(s) Performed:   section    Anesthesia type: Spinal Block and Epidural and transversus abdominus plane (TAP) nerve block        Subjective:   The patient reports good pain control.  Pain Intensity: 2/10.  Patient reports no pruritus, denies weakness, paresthesia.  Denies numbness, tingling lips, tinnitus, metallic taste, difficulties breathing nausea, vomiting, or difficulty voiding. She is able to ambulate and tolerates regular diet.        Objective:  Respiratory Function (RR / SpO2 / Airway Patency): Satisfactory    Cardiac Function (HR / Rhythm / BP): Satisfactory    Strength and sensation lower extremities: Strength 5/5 and grossly symmetric bilateral LE      Last Vitals: /70   Pulse 81   Temp 36.7  C (98.1  F) (Oral)   Resp 17   LMP 2019 (Approximate)   SpO2 99%   Breastfeeding Unknown       Assessment and Plan:   Sagrario Meyer is a 39 year old female POD #1 s/p   section with spinal 150 mcg morphine, 10 mcg fentanyl, and hyperbaric bupivacaine 0.75% 1.6 mL IT and single shot TAP nerve block injections bupivacaine 0.25% with liposome bupivacaine (Exparel) long-acting 1.3% 20mL given for postoperative analgesia.      Pt is ambulating without difficulty, no weakness or paresthesias.  No evidence of adverse side effects associated with spinal and nerve block injections. Pt is receiving adequate incisional pain control.  Anticipate up to 72 hours of incisional pain control.  Anticipate patient will require opioid/nonopioid analgesics for visceral and muscle pain not controlled with local anesthetic.      - NO other local anesthetic use within 96 hours of liposome bupivacaine (Exparel) long acting  - patient received verbal and written instructions about liposome bupivacaine   - please call if questions or concerns  - discussed plan with attending  "anesthesiologist      Pineda \"Parag\" Desiree BLANCAS MD  Anesthesia Resident  1/2/2020  6:39 AM    If questions or concerns, please contact OB Anesthesiologist  Phone [phone]            "

## 2020-01-02 NOTE — TELEPHONE ENCOUNTER
Pt states that she is not sure if she will go home today or tomorrow.  She has an appt to see Dr Webb on Monday and is wondering if she needs to be seen then and when baby needs to be seen?  Told her that she should wait until the doctor that is discharging her tells her but typically she would see the surgeon that did her c-sect in 2 weeks and Dr Webb in 6 weeks for postpartum visit.  Told her that she should let the doctor know that she has an appt for Monday that the baby could be seen by Dr Webb and if this is okay she can call back and register her baby then baby can be put in to her spot.  Pt verbalized understanding.  Pt states that she wants her baby circumcised and was told that they do not circumcise there.  Pt is wondering if she can have it done at [place]?  Told her that we do not circumcise at our clinic but I will check with the [facility] and [facility] to see if it can be done there.  Told her that I will call her back to let her know.    Called [facility] and they do not circumcise there and parents need to make arrangements for it to be done by their PCP.    Called and LM for June at [facility] to see if they can have it done there./NG

## 2020-01-02 NOTE — PROGRESS NOTES
OB/GYN Postpartum Note  POD#2, PLTCS    S:Patient reports feeling well this morning. A bit concerned about breastfeeding. Worried the baby isn't feeding enough. Baby has just been doing a lot of sleeping. Her pain is otherwise well controlled. Bleeding is light. Ambulating without lightheadedness. Voiding spontaneously. Has passed flatus and had a small BM last night. Tolerating regular diet without nausea or vomiting. Denies chest pain, shortness of breath, headaches, visual changes, swelling.    O:   Patient Vitals for the past 24 hrs:   BP Temp Temp src Heart Rate Resp   20 0727 118/67 98.2  F (36.8  C) -- 85 18   20 0000 124/70 98.1  F (36.7  C) Oral 85 17   20 1700 130/68 98.2  F (36.8  C) Oral 87 16   20 1034 117/64 97.8  F (36.6  C) Oral 96 16     General: sitting up in bed, NAD  CV: regular rate  Resp: nonlabored breathing  Abdomen: soft, nontender, moderately distended  Fundus: firm 2 cm above the umbilicus  Incision: c/d/i with steri strips overlying  Extremities: trace edema in BLE    HGB:  Recent Labs   Lab 20  0654 19  1648   HGB 11.3* 12.7     A: Ms. Sagrario Meyer is a 39 year old  POD#2 s/p PLTCS for arrest of dilation.    P:  Heme 2.7>  > 11.3 . VSS stable as above.  Pain: Controlled on Tylenol and ibuprofen. Has not used oxycodone.  : Voiding spontaneously  GI: Scheduled bowel regimen, encourage ambulation  Feeding: Breast  Contraception: Declines ( currently in [place])  PNC: Rh pos, rubella immune   Disposition: likely discharge tomorrow    Jenniffer Carver MD  OBGYN Resident PGY3  2020 7:46 AM     Appreciate note by Dr. Carver. Patient has been seen and examined by me separate from the resident, agree with above note. Feeling ready for discharge. Will follow up for Incision check in 2 weeks and PP visit in 6 weeks.      Saundra Lanza MD  3:49 PM

## 2020-01-03 NOTE — TELEPHONE ENCOUNTER
Discussed with Romy LEA at [facility] and since baby not cared for by [facility] providers in hospital stay or appt there for [facility] cannot do the circ.      Called pt and she states that she has appt with [facility] tomorrow and will ask them about whether baby can have circumcision.  She does still plan to come on Monday to see Dr Webb to f/u ./NG

## 2020-01-06 ENCOUNTER — OFFICE VISIT (OUTPATIENT)
Dept: FAMILY MEDICINE | Facility: CLINIC | Age: 40
End: 2020-01-06
Payer: COMMERCIAL

## 2020-01-06 VITALS
RESPIRATION RATE: 14 BRPM | HEART RATE: 86 BPM | SYSTOLIC BLOOD PRESSURE: 117 MMHG | DIASTOLIC BLOOD PRESSURE: 73 MMHG | WEIGHT: 154 LBS | BODY MASS INDEX: 26.43 KG/M2 | TEMPERATURE: 97.8 F

## 2020-01-06 NOTE — PROGRESS NOTES
There are no exam notes on file for this visit.  Chief Complaint   Patient presents with     Pain     abdomen upper left     Blood pressure 117/73, pulse 86, temperature 97.8  F (36.6  C), resp. rate 14, weight 69.9 kg (154 lb), unknown if currently breastfeeding.                 ALVARO Meyer is a 39 year old  female with a PMH significant for:     Patient Active Problem List   Diagnosis     Cystic acne     Latent tuberculosis     Redness of right eye     Chronic right-sided low back pain without sciatica     Vitamin D deficiency     AMA (advanced maternal age) multigravida 35+     Subserous leiomyoma of uterus     Supervision of high risk pregnancy WHDYLLAN SHIPLEY care     Multigravida of advanced maternal age in third trimester     Labor and delivery, indication for care     S/P  section     She presents with postpartum follow-up appointment today.  She had a  on 2019.  Incision was closed with sutures.  Her pain is been improving.  She has plenty of pain medications including Tylenol and ibuprofen.  She has not required stronger pain medicines recently.  Bleeding has slowed down.  No clots noted recently.  Denies fevers chills, vaginal discharge, nausea, vomiting, urination and stooling okay..  She is breast-feeding and no breast symptoms today.  She is having trouble with her breast pump as the one side of the pump is not working.  She has seen lactation consultant at the hospital and one-time outpatient as well.  Her one concern is some left upper quadrant pain.  She does have a history of a 10 cm fibroid that was in the left upper quadrant during most of the end of her pregnancy.    She is wondering about getting baby circumcised.  Clinic has not called her back about this.  Unable to be circumcised in the hospital as they do not do that at [facility].  That was the best place for her to deliver since it was near where her family support for labor lived.  Also planning to travel to  [facility] to visit her  and have her baby baptized as is the custom in her [place] around 40 days old.  She is wondering if that would be safe for baby.  Baby was seen on Saturday at [facility] and doing well at that time.  She has no concerns about baby today.    PMH, Medications and Allergies were reviewed and updated as needed.             Physical Exam:     Vitals:    01/06/20 1431   BP: 117/73   Pulse: 86   Resp: 14   Temp: 97.8  F (36.6  C)   Weight: 69.9 kg (154 lb)     Body mass index is 26.43 kg/m .    Exam:  Constitutional: healthy, alert and no distress  Cardiovascular:RRR. No murmurs, clicks gallops or rub  Respiratory:  normal respiratory rate and rhythm, lungs clear to auscultation. No wheezes or crackles.  Abdomen: +BS, soft, nontender, nondistended. No HSM.  Mild tenderness to palpation over left upper quadrant.  Fundus is a little above umbilicus but this would be consistent with her large fibroid.  Firm fundus.  Incision is clean dry and intact with Steri-Strips in place.  No erythema or drainage noted.  Extremities: No C/C/E in BLE.   Psychiatric: mentation appears normal and affect normal/bright      Assessment and Plan     Sagrario was seen today for pain.    Diagnoses and all orders for this visit:    Routine postpartum follow-up: She is doing well postpartum reassured her about the pain being likely muscle pain after large surgery.  Will likely improve.  Continue heating packs and continue Tylenol and ibuprofen as needed.  Will consider referral for fibroid removal after 6 weeks postpartum if giving her pain still.  Will help her get a replacement breast pump since hers is not working right.    Follow-up 6 weeks postpartum.  May need to follow-up a little sooner due to plans to travel out of the country.  We can discuss her safety for her travel at that time as well.  We will try to time baby's 1 month visit so that he can get his 2-month shots before  traveling.        Patient Instructions   Schedule Baby on Monday with Dr. Webb for 1 week check up.         Options for treatment and/or follow-up care were reviewed with the patient. Sagrario Meyer was engaged and actively involved in the decision making process. She verbalized understanding of the options discussed and was satisfied with the final plan.    Dee Webb MD

## 2020-01-07 ENCOUNTER — TELEPHONE (OUTPATIENT)
Dept: FAMILY MEDICINE | Facility: CLINIC | Age: 40
End: 2020-01-07

## 2020-01-07 NOTE — TELEPHONE ENCOUNTER
LM for Darling to let her know that Sagrario's breast pump is only working on one side and pt would like to exchange it for another breast pump but no longer has the box.  Pt would like Darling to call her to discuss./NG

## 2020-01-08 NOTE — TELEPHONE ENCOUNTER
Spoke with Darling at [facility] and she states that pt needs to call Meredith directly at [phone] to discuss issue with pump and to get exchanged for another pump.    LM with info for pt./NG

## 2020-01-16 ENCOUNTER — OFFICE VISIT (OUTPATIENT)
Dept: FAMILY MEDICINE | Facility: CLINIC | Age: 40
End: 2020-01-16
Payer: COMMERCIAL

## 2020-01-16 VITALS
RESPIRATION RATE: 16 BRPM | HEART RATE: 83 BPM | DIASTOLIC BLOOD PRESSURE: 75 MMHG | SYSTOLIC BLOOD PRESSURE: 109 MMHG | TEMPERATURE: 97.6 F

## 2020-01-16 DIAGNOSIS — N30.00 ACUTE CYSTITIS WITHOUT HEMATURIA: ICD-10-CM

## 2020-01-16 DIAGNOSIS — Z59.9 HOUSING OR ECONOMIC CIRCUMSTANCE: Primary | ICD-10-CM

## 2020-01-16 DIAGNOSIS — R30.0 DYSURIA: Primary | ICD-10-CM

## 2020-01-16 LAB
BACTERIA: NORMAL
BILIRUBIN UR: NEGATIVE MG/DL
BLOOD UR: ABNORMAL MG/DL
CASTS: NORMAL /LPF
CRYSTAL URINE: NORMAL /LPF
EPITHELIAL CELLS UR: NORMAL /LPF (ref 0–2)
GLUCOSE URINE: NEGATIVE
KETONES UR QL: NEGATIVE MG/DL
LEUKOCYTE ESTERASE UR: ABNORMAL
MUCOUS URINE: NORMAL LPF
NITRITE UR QL STRIP: NEGATIVE MG/DL
PH UR STRIP: 5.5 [PH] (ref 4.5–8)
PROTEIN UR: NEGATIVE MG/DL
RBC URINE: <2 /HPF
SP GR UR STRIP: 1.02 (ref 1–1.03)
UROBILINOGEN UR STRIP-ACNC: ABNORMAL E.U./DL
WBC URINE: NORMAL /HPF

## 2020-01-16 RX ORDER — CEFDINIR 300 MG/1
300 CAPSULE ORAL 2 TIMES DAILY
Qty: 10 CAPSULE | Refills: 0 | Status: SHIPPED | OUTPATIENT
Start: 2020-01-16 | End: 2020-01-21

## 2020-01-16 SDOH — ECONOMIC STABILITY - INCOME SECURITY: PROBLEM RELATED TO HOUSING AND ECONOMIC CIRCUMSTANCES, UNSPECIFIED: Z59.9

## 2020-01-16 ASSESSMENT — PATIENT HEALTH QUESTIONNAIRE - PHQ9: SUM OF ALL RESPONSES TO PHQ QUESTIONS 1-9: 1

## 2020-01-16 NOTE — PROGRESS NOTES
SUBJECTIVE       Sagrario Meyer is a 39 year old  female with a PMH significant for:     Patient Active Problem List   Diagnosis     Cystic acne     Latent tuberculosis     Redness of right eye     Chronic right-sided low back pain without sciatica     Vitamin D deficiency     AMA (advanced maternal age) multigravida 35+     Subserous leiomyoma of uterus     Supervision of high risk pregnancy BILLIE SHIPLEY care     Multigravida of advanced maternal age in third trimester     Labor and delivery, indication for care     S/P  section     She presents with dysuria.    Patient first noted burning pain with urination sometime last week with change in urine color (orange). Patient denies fevers/chills. No nausea/vomiting or diarrhea. Has some abdominal pain, especially when urination. Localizes pain to mid-gastric area, radiates to sides/flanks but she has had this pain since after her .  It did not start with the dysuria.. No sexual activity in post-partum period.     PMH, Medications and Allergies were reviewed and updated as needed.        REVIEW OF SYSTEMS     CONSTITUTIONAL: NEGATIVE for fever, chills, change in weight  INTEGUMENTARY/SKIN: NEGATIVE for worrisome rashes, moles or lesions  RESP: NEGATIVE for significant cough or SOB  CV: NEGATIVE for chest pain, palpitations or peripheral edema  GI: NEGATIVE for nausea, abdominal pain, heartburn, or change in bowel habits   female: dysuria, no hematuria. Still having minimal vaginal bleeding everyday, no pain with bleeding or high volume  PSYCHIATRIC: NEGATIVE for changes in mood or affect        OBJECTIVE     Vitals:    20 0856   BP: 109/75   Pulse: 83   Resp: 16   Temp: 97.6  F (36.4  C)     Constitutional: Awake, alert, cooperative, no apparent distress, and appears stated age.  Lungs: No increased work of breathing, good air exchange, clear to auscultation bilaterally, no crackles or wheezing.  Cardiovascular: Regular rate and rhythm, normal  S1 and S2, no S3 or S4, and no murmur noted.  Abdomen: No scars, normal bowel sounds, soft, non-distended, non-tender, no masses palpated, no hepatosplenomegally.  No CVA tenderness.    Results for orders placed or performed in visit on 01/16/20 (from the past 24 hour(s))   Urinalysis, Micro If ([facility])   Result Value Ref Range    Specific Gravity Urine 1.025 1.005 - 1.030    pH Urine 5.5 4.5 - 8.0    Leukocyte Esterase UR 2+ (A) NEGATIVE    Nitrite Urine Negative NEGATIVE mg/dL    Protein UR Negative NEGATIVE mg/dL    Glucose Urine Negative NEGATIVE    Ketones Urine Negative NEGATIVE mg/dL    Urobilinogen mg/dL 0.2 E.U./dL 0.2 E.U./dL E.U./dL    Bilirubin UR Negative NEGATIVE mg/dL    Blood UR 2+ (A) NEGATIVE mg/dL   Urine Microscopic ([facility])   Result Value Ref Range    WBC Urine 5-10 <5 /hpf    RBC Urine <2 <5 /hpf    Epithelial Cells UR 2-5 0 - 2 /lpf    Mucous Urine None NONE lpf    Casts Urine None NONE /lpf    Crystal Urine None NONE /lpf    Bacteria Wet Prep Few None           ASSESSMENT AND PLAN     1. Dysuria  Patient with week long dysuria and abdominal pain with urination. No fevers/chills, no recent sexual encounters or abnormal vaginal discharge. Patient's UA shows evidence of WBC, blood and bacteria, concerning for urinary tract infection. Some concern for pyelonephritis given ongoing back pain, but no evidence of CVA tenderness.   - Cefdinir 300 mg BID for 5 days  - Urinalysis, Micro If ([facility])  - Urine Microscopic ([facility])        RTC in 1/31 for travel visit. Return sooner if worsening urinary symptoms.     Adelfo Clay    I was present with the medical student who participated in the service and in the documentation of this note. I have verified the history and personally performed the physical exam and medical decision making, and have verified the content of the note, which accurately reflects my assessment of the patient and the plan of care.   Dee Webb MD

## 2020-01-16 NOTE — PROGRESS NOTES
January 16, 2020   Legal Services Referral - [place] only  Legal referral has been sent to Melanie Ghosh from [facility].     Scan/Send demographics and referral to [email or web address]    She will review, advise, and contact the patient.     Nichol Loza

## 2020-01-23 ENCOUNTER — TELEPHONE (OUTPATIENT)
Dept: FAMILY MEDICINE | Facility: CLINIC | Age: 40
End: 2020-01-23

## 2020-01-23 NOTE — TELEPHONE ENCOUNTER
Pt needs to call [facility] directly at [phone] to request a new breast pump.    Gave above info to Cinda.  She asked that I call pt with the info again and if pt needs help with advocating she will help her.  She asked who would help her with the breast pump if she did not know how to use it and told her that I could or someone at [facility].  She verbalized understanding.    Called and LM with info for pt./NG

## 2020-01-23 NOTE — TELEPHONE ENCOUNTER
[facility] phone call message- general phone call:    Reason for call: Pt does not speak english well so la co is calling for her.  She is requesting a new breast pump.  She currently has one but it is not working on one side.  Pt would like a double elecrtic Medela breast pump. Please call acosta back at number provided.    Return call needed: Yes    OK to leave a message on voice mail? Yes    Primary language: English      needed? No    Call taken on January 23, 2020 at 1:02 PM by Cristopher Saba

## 2020-01-27 NOTE — TELEPHONE ENCOUNTER
Pt states that she called and was not able to get a new pump.  Pt asked for help with calling for a new pump.    Called [facility] and pt will need to email a copy of receipt to [email or web address] and then call to let them know that she needs a new breast pump and would like a different model.      Gave pt info she states that she does not have the receipt.  Told her that I will call [employer] and have them email the receipt to her at [email or web address].      Called Darling at [employer] and she will send Sagrario the email./JAVY

## 2020-01-31 ENCOUNTER — OFFICE VISIT (OUTPATIENT)
Dept: FAMILY MEDICINE | Facility: CLINIC | Age: 40
End: 2020-01-31
Payer: COMMERCIAL

## 2020-01-31 VITALS
OXYGEN SATURATION: 98 % | SYSTOLIC BLOOD PRESSURE: 127 MMHG | TEMPERATURE: 98.3 F | WEIGHT: 142.2 LBS | RESPIRATION RATE: 16 BRPM | BODY MASS INDEX: 24.41 KG/M2 | DIASTOLIC BLOOD PRESSURE: 79 MMHG | HEART RATE: 82 BPM

## 2020-01-31 DIAGNOSIS — Z71.84 TRAVEL ADVICE ENCOUNTER: ICD-10-CM

## 2020-01-31 RX ORDER — LEVONORGESTREL/ETHIN.ESTRADIOL 0.1-0.02MG
1 TABLET ORAL DAILY
Qty: 86 TABLET | Refills: 4 | Status: SHIPPED | OUTPATIENT
Start: 2020-01-31 | End: 2021-04-23

## 2020-01-31 RX ORDER — LOPERAMIDE HYDROCHLORIDE 2 MG/1
TABLET ORAL
Qty: 30 TABLET | Refills: 0 | Status: SHIPPED | OUTPATIENT
Start: 2020-01-31 | End: 2021-04-23

## 2020-01-31 NOTE — LETTER
2020      Re: Sagrario Meyer  [address]   [place] [address]      To Whom It May Concern,    I am the physician for Sagrario Meyer who is a [place].  She gave birth to a baby boy, Lorrie Lang, on 2019 and has no family in the [place].  Her  and the father of the baby, Ash Lang  1979, is currently in hospitals. It is medically recommended that she have support of her  to help care for her baby.  It is urgent to reunite this family in the [place] as soon as possible. Anything you can do to expedite immigration of her  and the father of Lorrie to the [place] would be appreciated.     Sincerely,     Dee Webb MD

## 2020-01-31 NOTE — PATIENT INSTRUCTIONS
NEED to bring unopened box breast pump to [facility] for a different model or brand.    [facility]   [address]  [place] [address]    Open 10 am to 4 pm Monday-Friday

## 2020-01-31 NOTE — PROGRESS NOTES
HPI-Post Partum Visit -       Sagrario Meyer is a 39 year old  female  without a significant past medical history who presents for a postpartum visit. OBSTETRIC HISTORY:  for failure to progress. Had induction for polyhydramnios and did not dilate past 6 cm. Delivered on 2019 at [facility].    Patient Active Problem List   Diagnosis     Cystic acne     Latent tuberculosis     Redness of right eye     Chronic right-sided low back pain without sciatica     Vitamin D deficiency     AMA (advanced maternal age) multigravida 35+     Subserous leiomyoma of uterus     Supervision of high risk pregnancy [facility] care     Multigravida of advanced maternal age in third trimester     Labor and delivery, indication for care     S/P  section       Current Outpatient Medications   Medication Sig Dispense Refill     levonorgestrel-ethinyl estradiol (AVIANE/ALESSE/LESSINA) 0.1-20 MG-MCG tablet Take 1 tablet by mouth daily 86 tablet 4     loperamide (IMODIUM A-D) 2 MG tablet Take 2 tabs (4 mg) after first loose stool, and then take one tab (2 mg) after each diarrheal stool.  Max of 4 tabs (8 mg) per day. 30 tablet 0     Prenatal Vit-Fe Fumarate-FA (PRENATAL MULTIVITAMIN W/IRON) 27-0.8 MG tablet Take 1 tablet by mouth daily 100 tablet 3     vitamin D3 (CHOLECALCIFEROL) 2000 units tablet Take 1 tablet by mouth daily 100 tablet 3     acetaminophen (TYLENOL) 325 MG tablet Take 2 tablets (650 mg) by mouth every 6 hours as needed for mild pain Start after Delivery. 100 tablet 0     hydrocortisone 2.5 % cream Apply to face 2 to 3 times a week. From Dermatology.       ibuprofen (ADVIL/MOTRIN) 600 MG tablet Take 1 tablet (600 mg) by mouth every 6 hours as needed for moderate pain Start after delivery 60 tablet 0     order for DME Equipment being ordered: Double electric Breast Pump Medela brand. 1 Device 0     senna-docusate (SENOKOT-S/PERICOLACE) 8.6-50 MG tablet Take 1 tablet by mouth daily Start after  delivery. 100 tablet 0                      Delivery date: 12/31/2019      Patient had a c/s for FTP of viable boy, weight 8 lbs 15 ozs,           with no complications.          Accompanying Signs & Symptoms:                        Breast feeding: pumped breastmilk by bottle   Abdominal pain: a little on the scar with movement.                        Bleeding since delivery: yes mild but brown and slowing down.        Contraception choice: oral contraceptives        Patient has not had intercourse since delivery              and complains of No discomfort.        Last PAP: 5/2019  normal  Pt screened for postpartum depression and complaints are: NEGATIVE         PHQ9= 3          Receiving dental care NO   Calcium intake is Adequate    Traveling to [facility] and staying in [facility] for the whole time she is there.  Traveling on Monday and staying for 1 month.  Discussed safe water and food, insect precautions, no need for malaria prophylaxis.  Reviewed her immunizations.  She is up-to-date on immunizations and she had a travel visit last year when she had travel to [facility] at that time.       Allergies   Allergen Reactions     Peanuts [Nuts]      acne            Review of Systems:   CONSTITUTIONAL: no fatigue, no unexpected change in weight  RESP: no significant cough, no shortness of breath  CV: no chest pain, no palpitations, no new or worsening peripheral edema  GI: no nausea, no vomiting, no constipation, no diarrhea  : no frequency, no dysuria, no hematuria  PSYCHIATRIC: NEGATIVE for changes in mood or trouble with sleep            Physical Exam:     Vitals:    01/31/20 1042   BP: 127/79   Pulse: 82   Resp: 16   Temp: 98.3  F (36.8  C)   TempSrc: Oral   SpO2: 98%   Weight: 64.5 kg (142 lb 3.2 oz)       GENERAL: healthy, alert, well nourished, well hydrated, no distress  NECK: no tenderness, no adenopathy, no asymmetry, no masses, no stiffness; thyroid- normal to palpation  RESP: lungs clear  to auscultation - no rales, no rhonchi, no wheezes  CV: regular rates and rhythm, normal S1 S2, no S3 or S4 and no murmur, no click or rub -  ABDOMEN: soft, no tenderness, no  hepatosplenomegaly, no masses, normal bowel sounds  PSYCH: Alert and oriented times 3; speech- coherent , normal rate and volume; able to articulate logical thoughts, able to abstract reason, no tangential thoughts, no hallucinations or delusions, affect- normal      Office Visit on 01/16/2020   Component Date Value Ref Range Status     Specific Gravity Urine 01/16/2020 1.025  1.005 - 1.030 Final     pH Urine 01/16/2020 5.5  4.5 - 8.0 Final     Leukocyte Esterase UR 01/16/2020 2+* NEGATIVE Final     Nitrite Urine 01/16/2020 Negative  NEGATIVE mg/dL Final     Protein UR 01/16/2020 Negative  NEGATIVE mg/dL Final     Glucose Urine 01/16/2020 Negative  NEGATIVE Final     Ketones Urine 01/16/2020 Negative  NEGATIVE mg/dL Final     Urobilinogen mg/dL 01/16/2020 0.2 E.U./dL  0.2 E.U./dL E.U./dL Final     Bilirubin UR 01/16/2020 Negative  NEGATIVE mg/dL Final     Blood UR 01/16/2020 2+* NEGATIVE mg/dL Final     WBC Urine 01/16/2020 5-10  <5 /hpf Final     RBC Urine 01/16/2020 <2  <5 /hpf Final     Epithelial Cells UR 01/16/2020 2-5  0 - 2 /lpf Final     Mucous Urine 01/16/2020 None  NONE lpf Final     Casts Urine 01/16/2020 None  NONE /lpf Final     Crystal Urine 01/16/2020 None  NONE /lpf Final     Bacteria Wet Prep 01/16/2020 Few  None Final         Assessment and Plan   Sagrario was seen today for travel clinic.    Diagnoses and all orders for this visit:    Travel advice encounter: Counseled on food and water and safety, insect precautions, no need for malaria prophylaxis.  Gave loperamide for traveler's diarrhea.  Up-to-date on immunizations.  -     loperamide (IMODIUM A-D) 2 MG tablet; Take 2 tabs (4 mg) after first loose stool, and then take one tab (2 mg) after each diarrheal stool.  Max of 4 tabs (8 mg) per day.    Routine postpartum follow-up:  Recovering well after her .  Will likely need follow-up with gynecology for her large fibroid in the next few months.  Will start birth control pill for contraception at this time.  -     levonorgestrel-ethinyl estradiol (AVIANE/ALESSE/LESSINA) 0.1-20 MG-MCG tablet; Take 1 tablet by mouth daily    -Tdap for pertussis prophylaxis: UTD  -Pap not indicated  -Contraception: oral contraceptives    Options for treatment and follow-up care were reviewed with the patient and/or guardian. Sagrario Meyer and/or guardian engaged in the decision making process and verbalized understanding of the options discussed and agreed with the final plan.    Dee Webb MD

## 2020-01-31 NOTE — LETTER
RETURN TO WORK/SCHOOL FORM    2020    Re: Sagrario Meyer  1980      To Whom It May Concern:     Sagrario Meyer was seen in clinic today. She had a  and needs time to recover.  I recommend 12 weeks maternity leave.   was 2019.       Restrictions:  Unable to work until 12 weeks postpartum. 12 weeks postpartum is recommended to recover and care for baby, until after 3/24/2020.      Dee Webb MD  2020 11:56 AM

## 2020-05-08 DIAGNOSIS — E55.9 VITAMIN D DEFICIENCY: ICD-10-CM

## 2020-05-08 RX ORDER — LORATADINE 10 MG
TABLET ORAL
Qty: 30 TABLET | Refills: 13 | Status: SHIPPED | OUTPATIENT
Start: 2020-05-08 | End: 2020-07-16

## 2020-05-08 RX ORDER — CHOLECALCIFEROL (VITAMIN D3) 50 MCG
TABLET ORAL
Qty: 30 TABLET | Refills: 13 | Status: SHIPPED | OUTPATIENT
Start: 2020-05-08 | End: 2020-07-16

## 2020-05-20 ENCOUNTER — TELEPHONE (OUTPATIENT)
Dept: FAMILY MEDICINE | Facility: CLINIC | Age: 40
End: 2020-05-20

## 2020-05-20 NOTE — TELEPHONE ENCOUNTER
[facility] phone call message- general phone call:    Reason for call: She needs a call back.    Action desired: call back.    Return call needed: Yes    OK to leave a message on voice mail? Yes    Advised patient to response may take up to 2 business days: Yes    Primary language: English      needed? No    Call taken on May 20, 2020 at 9:26 AM by Graham Dupree

## 2020-05-20 NOTE — TELEPHONE ENCOUNTER
Pt scheduled an appt to see Dr Webb for follow-up on 5/28/20 at 2:10 PM.  She states that she just wants Dr Webb to check her out because it has been awhile but denies any symptoms or concerns. Her baby has an appt for 4 mo [facility] following hers on 5/28.      Pt states that she is afraid to go back to work because the virus is in her workplace and she does not want to give it to her baby.  She also states that she does not have anyone to watch her baby.  She would like to file for unemployment and is wondering if Dr Webb could complete the form she is going to drop ASAP and fax to unemployment as well as mail the original back to her?  She states that Dr Webb can call her with any questions.  Routed note to Dr Webb./JAVY

## 2020-05-21 ENCOUNTER — DOCUMENTATION ONLY (OUTPATIENT)
Dept: FAMILY MEDICINE | Facility: CLINIC | Age: 40
End: 2020-05-21

## 2020-05-21 NOTE — PROGRESS NOTES
To be completed in Nursing note:    Please reference list for forms that require a visit for completion.  Please remind patients that providers are given 3-5 business days to complete and return forms.      Form type: Unemployment insurance     Date form received: 5/20/20    Date form completed by Physician:     How was form returned to patient (mailed, faxed, or at  for patient to ):    Date form mailed/faxed/left at  for patient and sent to HIM for scanning:      Once form is left for patient, faxed, or mailed PCS will then close the documentation only encounter.

## 2020-05-22 NOTE — TELEPHONE ENCOUNTER
Due to my schedule I am unable to complete the form sooner than her visit next week.  I will completed and ask her questions during visit next week.  Dee Webb MD

## 2020-05-26 ENCOUNTER — TELEPHONE (OUTPATIENT)
Dept: FAMILY MEDICINE | Facility: CLINIC | Age: 40
End: 2020-05-26

## 2020-05-26 NOTE — TELEPHONE ENCOUNTER
Dee Webb MD           4:00 PM   Note      Due to my schedule I am unable to complete the form sooner than her visit next week.  I will completed and ask her questions during visit next week.  Dee Webb MD

## 2020-05-26 NOTE — TELEPHONE ENCOUNTER
[facility] phone call message- general phone call:    Reason for call: She needs to know if  is done with that paperwork.    Action desired: call back.    Return call needed: Yes    OK to leave a message on voice mail? Yes    Advised patient to response may take up to 2 business days: Yes    Primary language: English      needed? No    Call taken on May 26, 2020 at 9:00 AM by Graham Dupree

## 2020-05-26 NOTE — TELEPHONE ENCOUNTER
JOSÉ with info for pt and told her to call if she wanted to see if another doctor could complete sooner than 5/28 appt./JAVY

## 2020-05-28 ENCOUNTER — OFFICE VISIT (OUTPATIENT)
Dept: FAMILY MEDICINE | Facility: CLINIC | Age: 40
End: 2020-05-28
Payer: COMMERCIAL

## 2020-05-28 VITALS
SYSTOLIC BLOOD PRESSURE: 119 MMHG | BODY MASS INDEX: 24.03 KG/M2 | TEMPERATURE: 97.8 F | RESPIRATION RATE: 16 BRPM | DIASTOLIC BLOOD PRESSURE: 77 MMHG | WEIGHT: 140 LBS | HEART RATE: 86 BPM

## 2020-05-28 DIAGNOSIS — D25.2 SUBSEROUS LEIOMYOMA OF UTERUS: ICD-10-CM

## 2020-05-28 LAB — HCG UR QL: NEGATIVE

## 2020-05-28 NOTE — PROGRESS NOTES
There are no exam notes on file for this visit.  Chief Complaint   Patient presents with     RECHECK     post paretum and also could be pregnant      Blood pressure 119/77, pulse 86, temperature 97.8  F (36.6  C), temperature source Oral, resp. rate 16, weight 63.5 kg (140 lb), last menstrual period 2020, currently breastfeeding.                 ALVARO Meyer is a 39 year old  female with a PMH significant for:     Patient Active Problem List   Diagnosis     Cystic acne     Latent tuberculosis     Redness of right eye     Chronic right-sided low back pain without sciatica     Vitamin D deficiency     AMA (advanced maternal age) multigravida 35+     Subserous leiomyoma of uterus     Supervision of high risk pregnancy BILLIE SHIPLEY care     Multigravida of advanced maternal age in third trimester     Labor and delivery, indication for care     S/P  section     She presents with concern about possible pregnancy.  LMP 20.  That period was her first period since having her baby on .  She has been sexually active with her  [place] [facility] [place].  She has not been sexually active since returning to the .  She  just wanted to make sure she wasn't pregnant.    She is feeling pressure in her lower abdomen near her  scar when she stand for 30 to 60 minutes. Feels like something is falling down. No pain except when she pushes on the scar.  Wonders if it might be related to the large fibroid she has as well.  She has a large exophytic fundal fibroid that was at 8 cm while she was pregnant.  Plan was for have it removed after her pregnancy.    No problems with her mood or her breasts.  She is breast-feeding without difficulty.  Denies any vaginal discharge or pain.  No urinary symptoms.  She is eating well.    She does have issues with money right now.  She has been an employee of [employer] for many years.  She went on leave due to her pregnancy on .  Her maternity leave wait  until March 24.  However she [place] in [place] due [place].  She did not make it back to this country until May 5.  She is afraid to go back to work due to virus at her workplace.  She also does not have any current childcare at this time.  She was planning to have a friend care for her child but the friend has many children of her own and is afraid that her kids will get sick due to the patient's exposure at work.  She has a unemployment insurance form asking about medical disability and ability to work on it for me to fill out.  She is interested in help with getting .    PMH, Medications and Allergies were reviewed and updated as needed.           Physical Exam:     Vitals:    05/28/20 1421   BP: 119/77   Pulse: 86   Resp: 16   Temp: 97.8  F (36.6  C)   TempSrc: Oral   Weight: 63.5 kg (140 lb)     Body mass index is 24.03 kg/m .  Wt Readings from Last 4 Encounters:   05/28/20 63.5 kg (140 lb)   01/31/20 64.5 kg (142 lb 3.2 oz)   01/06/20 69.9 kg (154 lb)   12/18/19 75.4 kg (166 lb 3.2 oz)        Exam:  Constitutional: healthy, alert and no distress  Cardiovascular:RRR. No murmurs, clicks gallops or rub  Respiratory:  normal respiratory rate and rhythm, lungs clear to auscultation. No wheezes or crackles.  Abdomen: +BS, soft, nontender, nondistended. No HSM.  Incision is clean dry and intact.  Slightly tender to touch.  : Vulvar area is normal with no discharge no prolapse of uterus.  Extremities: No C/C/E in BLE.   Psychiatric: mentation appears normal and affect normal/bright      PHQ-9 SCORE 10/28/2019 11/11/2019 1/16/2020   PHQ-9 Total Score 1 2 1     Results for orders placed or performed in visit on 05/28/20   HCG Qualitative Urine (LabDAQ)     Status: None   Result Value Ref Range    HCG Qual Urine NEGATIVE Negative       Assessment and Plan     Sagrario was seen today for recheck.    Diagnoses and all orders for this visit:    Routine postpartum follow-up: Doing well postpartum  day denies any need for birth control as she is not sexually active at this time.  Discussed that she is not pregnant.  If she misses a future menstrual period consider retesting.  -     HCG Qualitative Urine (LabDAQ)  Large fibroid: Eventually will need this removed.  I wonder if it is playing a role in her abdominal pain and pressure.  Pain she is feeling could also be just normal healing from .  Discussed that it is normal to have tenderness for several months.  Consider imaging of uterus and fibroid if pain is worsening.    Financial issues: Currently has no income and no .  Discussed that I cannot fill out her paperwork saying she has a disability and cannot work at this time.  She understands that.  I did fill out the dates that she was not able to work due to her pregnancy and maternity leave.  Routing chart to  to help her with  resources. Planning to fax the paperwork on  due to clinic being closed .       Options for treatment and/or follow-up care were reviewed with the patient. Sagrario Meyer was engaged and actively involved in the decision making process. She verbalized understanding of the options discussed and was satisfied with the final plan.    Dee Webb MD

## 2020-07-16 DIAGNOSIS — E55.9 VITAMIN D DEFICIENCY: ICD-10-CM

## 2020-07-16 RX ORDER — CHOLECALCIFEROL (VITAMIN D3) 50 MCG
1 TABLET ORAL DAILY
Qty: 30 TABLET | Refills: 13 | Status: SHIPPED | OUTPATIENT
Start: 2020-07-16 | End: 2021-04-23

## 2020-07-16 RX ORDER — LORATADINE 10 MG
1 TABLET ORAL DAILY
Qty: 30 TABLET | Refills: 13 | Status: SHIPPED | OUTPATIENT
Start: 2020-07-16 | End: 2021-04-23

## 2020-09-12 NOTE — LETTER
RETURN TO WORK/SCHOOL FORM    5/30/2019    Re: Sagrario Meyer  1980      To Whom It May Concern:     Sagrario Meyer was seen in clinic today..  She needs to be off of work for about one week.  Needs to be cleared at follow up appointment next week.           Dee Webb MD  5/30/2019 12:39 PM   
6 months to 35 months (need ONE to TWO doses)...

## 2021-01-03 ENCOUNTER — HEALTH MAINTENANCE LETTER (OUTPATIENT)
Age: 41
End: 2021-01-03

## 2021-01-21 ENCOUNTER — VIRTUAL VISIT (OUTPATIENT)
Dept: FAMILY MEDICINE | Facility: CLINIC | Age: 41
End: 2021-01-21
Payer: COMMERCIAL

## 2021-01-21 DIAGNOSIS — Z59.19 HAZARDS IN HOME: ICD-10-CM

## 2021-01-21 DIAGNOSIS — Z00.00 ENCOUNTER FOR PREVENTIVE CARE: ICD-10-CM

## 2021-01-21 DIAGNOSIS — Z77.128 HAZARDS IN HOME: ICD-10-CM

## 2021-01-21 DIAGNOSIS — D25.2 SUBSEROUS LEIOMYOMA OF UTERUS: Primary | ICD-10-CM

## 2021-01-21 PROCEDURE — 99213 OFFICE O/P EST LOW 20 MIN: CPT | Mod: 95 | Performed by: FAMILY MEDICINE

## 2021-01-21 SDOH — ECONOMIC STABILITY - HOUSING INSECURITY: OTHER INADEQUATE HOUSING: Z59.19
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
## 2021-01-25 ENCOUNTER — ANCILLARY PROCEDURE (OUTPATIENT)
Dept: ULTRASOUND IMAGING | Facility: CLINIC | Age: 41
End: 2021-01-25
Attending: FAMILY MEDICINE
Payer: COMMERCIAL

## 2021-01-25 DIAGNOSIS — D25.2 SUBSEROUS LEIOMYOMA OF UTERUS: ICD-10-CM

## 2021-02-01 ENCOUNTER — TELEPHONE (OUTPATIENT)
Dept: FAMILY MEDICINE | Facility: CLINIC | Age: 41
End: 2021-02-01
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
## 2021-02-01 NOTE — RESULT ENCOUNTER NOTE
Called the patient and discussed results. Fibroid is similar size to before.  She is having some symptoms but it is too hard to take care of the baby by herself after a surgery to remove it.  She plans to discuss with her , who is in [place] before choosing what to do.  She will let me know if she wants an Gynecology referral for removal of this.

## 2021-04-05 ENCOUNTER — TELEPHONE (OUTPATIENT)
Dept: FAMILY MEDICINE | Facility: CLINIC | Age: 41
End: 2021-04-05

## 2021-04-05 NOTE — TELEPHONE ENCOUNTER
Called patient to check in regarding her apartment situation in which neighbors were smoking mariajuana all the time and making her toddler's asthma worse. No answer, left VM.     SW consulted with Melanie, , to see if a [facility] case was opened. Will wait to hear back.     Upcoming clinic scheduled with Dr. Webb 4/23, will check in at that time if no news before then.     TA [place]

## 2021-04-06 NOTE — TELEPHONE ENCOUNTER
Patient returned call to  and states that the legal housing situation has not improved; her neighbors continue to smoke mariajuana constantly and it makes her nauseous and makes it hard to breathe sometimes. She has been talking to everyone she can think of, and there has been no change. The landlord states they cannot help and cannot help her and they cannot move her to a different section 8 place because she previously declined an apartment they offered. She declined this because it was a very long commute from her workplace. She is very frustrated with this.     States the apartment is also dangerous for her 15 month old son, as it is very small and old, and he can reach the stove. The heater coils are also accessible to him so she has to watch him very closely.     Son, Jeana Lang, broke his leg in an accident on a playground on 3/27. He is in a cast now and is very fussy because he is in pain all the time. He is very active and will not stop attempting to walk on his leg even though it is in a cast. Requesting call from Dr. Webb regarding pain medicine and other tips for her son.      Created phone note in Jeana's chart and routed to Dr. Webb, CÉSAR Do, and CÉSAR Nick.   Will submit a new legal referral for housing situation.     TA Del Rio

## 2021-04-23 ENCOUNTER — OFFICE VISIT (OUTPATIENT)
Dept: FAMILY MEDICINE | Facility: CLINIC | Age: 41
End: 2021-04-23
Payer: COMMERCIAL

## 2021-04-23 VITALS
WEIGHT: 138 LBS | BODY MASS INDEX: 23.69 KG/M2 | HEART RATE: 86 BPM | DIASTOLIC BLOOD PRESSURE: 68 MMHG | SYSTOLIC BLOOD PRESSURE: 108 MMHG | RESPIRATION RATE: 16 BRPM | OXYGEN SATURATION: 98 % | TEMPERATURE: 98.1 F

## 2021-04-23 DIAGNOSIS — D25.2 SUBSEROUS LEIOMYOMA OF UTERUS: Primary | ICD-10-CM

## 2021-04-23 PROCEDURE — 99213 OFFICE O/P EST LOW 20 MIN: CPT | Performed by: FAMILY MEDICINE

## 2021-04-23 NOTE — LETTER
2021      RE: Sagrario Meyer  [address]   [place] [address]        To Whom It May Concern,    Sagrario Meyer  1980 is my patient.  She has a medical condition that requires surgery to treat. She has no family in this country to help take care of her son, Sudeep Lang  2019, who is only 15 months old, while she has surgery and is recovering.  Her  and father of her child is in [place] and wishes to come to the [place] to help her take care of their son so she can have the surgery. She will continue to have pain and potential complications, iIncluding potential severe bleeding, from her medical condition if this surgery is not done.        Sincerely,    Dee Webb MD

## 2021-04-23 NOTE — PROGRESS NOTES
Assessment & Plan     Subserous leiomyoma of uterus  Continues to have discomfort and heavy periods at times which are related to the large >7cm fibroid that she has. It has been recommended that she have it removed but she has no family to care for her 15 month old son.  He  has wanted to come to the [place] to help her take care of her son but he has been not been allowed a VISA. He is in [place] currently. She and her son are [place] citizens.  I wrote a letter of medical necessity to assist with her  coming to the [place].                   No follow-ups on file.    Dee Webb MD  [facility]    Kailash Zabala is a 40 year old who presents for the following health issues     HPI       Continues to have pain and heavy periods from the fibroid. She wants it removed but does not have  for surgery and recovery time.  Review of Systems         Objective    /68 (BP Location: Left arm, Patient Position: Sitting, Cuff Size: Adult Regular)   Pulse 86   Temp 98.1  F (36.7  C) (Oral)   Resp 16   Wt 62.6 kg (138 lb)   SpO2 98%   BMI 23.69 kg/m    Body mass index is 23.69 kg/m .  Physical Exam   GENERAL: healthy, alert and no distress  ABDOMEN: soft, nontender, no hepatosplenomegaly, Uterus is enlarged and fibroid is palpable and bowel sounds normal  MS: no gross musculoskeletal defects noted, no edema

## 2021-05-03 ENCOUNTER — TELEPHONE (OUTPATIENT)
Dept: FAMILY MEDICINE | Facility: CLINIC | Age: 41
End: 2021-05-03
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
## 2021-05-05 ENCOUNTER — OFFICE VISIT (OUTPATIENT)
Dept: FAMILY MEDICINE | Facility: CLINIC | Age: 41
End: 2021-05-05
Payer: COMMERCIAL

## 2021-05-05 VITALS
RESPIRATION RATE: 16 BRPM | OXYGEN SATURATION: 99 % | HEART RATE: 85 BPM | DIASTOLIC BLOOD PRESSURE: 76 MMHG | TEMPERATURE: 98 F | SYSTOLIC BLOOD PRESSURE: 118 MMHG

## 2021-05-05 DIAGNOSIS — R11.0 NAUSEA: ICD-10-CM

## 2021-05-05 DIAGNOSIS — F51.05 INSOMNIA DUE TO OTHER MENTAL DISORDER: ICD-10-CM

## 2021-05-05 DIAGNOSIS — Z77.128 HAZARDS IN HOME: Primary | ICD-10-CM

## 2021-05-05 DIAGNOSIS — G44.209 TENSION HEADACHE: ICD-10-CM

## 2021-05-05 DIAGNOSIS — Z59.19 HAZARDS IN HOME: Primary | ICD-10-CM

## 2021-05-05 DIAGNOSIS — F99 INSOMNIA DUE TO OTHER MENTAL DISORDER: ICD-10-CM

## 2021-05-05 DIAGNOSIS — R12 HEARTBURN: ICD-10-CM

## 2021-05-05 PROCEDURE — 99213 OFFICE O/P EST LOW 20 MIN: CPT | Mod: GC | Performed by: FAMILY MEDICINE

## 2021-05-05 RX ORDER — ACETAMINOPHEN 500 MG
500-1000 TABLET ORAL EVERY 6 HOURS PRN
Qty: 90 TABLET | Refills: 1 | Status: SHIPPED | OUTPATIENT
Start: 2021-05-05 | End: 2023-02-01

## 2021-05-05 RX ORDER — HYDROXYZINE HYDROCHLORIDE 25 MG/1
25 TABLET, FILM COATED ORAL
Qty: 60 TABLET | Refills: 0 | Status: SHIPPED | OUTPATIENT
Start: 2021-05-05 | End: 2021-05-27

## 2021-05-05 RX ORDER — ONDANSETRON 4 MG/1
4 TABLET, ORALLY DISINTEGRATING ORAL EVERY 8 HOURS PRN
Qty: 20 TABLET | Refills: 0 | Status: SHIPPED | OUTPATIENT
Start: 2021-05-05 | End: 2021-06-24

## 2021-05-05 RX ORDER — IBUPROFEN 400 MG/1
400 TABLET, FILM COATED ORAL EVERY 6 HOURS PRN
Qty: 90 TABLET | Refills: 1 | Status: CANCELLED | OUTPATIENT
Start: 2021-05-05

## 2021-05-05 RX ORDER — FAMOTIDINE 40 MG/1
40 TABLET, FILM COATED ORAL 2 TIMES DAILY
Qty: 90 TABLET | Refills: 1 | Status: SHIPPED | OUTPATIENT
Start: 2021-05-05 | End: 2021-06-14

## 2021-05-05 SDOH — ECONOMIC STABILITY - HOUSING INSECURITY: OTHER INADEQUATE HOUSING: Z59.19

## 2021-05-05 ASSESSMENT — PATIENT HEALTH QUESTIONNAIRE - PHQ9: SUM OF ALL RESPONSES TO PHQ QUESTIONS 1-9: 13
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
## 2021-05-05 NOTE — PROGRESS NOTES
Social Work Note:    Data and Intervention: Met with patient during her office visit. Discussed safety, housing options, and car repair options.     Dr. Silva wrote a letter advocating for patient to be moved to a different public housing location as the current one is impacting the health and safety of her and her son Lorrie. Mailed a copy to the [facility] main office on [address], and sent patient with a copy to give to her .     Provided print out for [employer], a [place] non-profit that helps with car repairs for low income families.     Email from Fannie at [facility], who is helping with this case while Melanie is out. She informed that [place] is on Public Housing, not Section 8, and that unfortunately, in Public housing s eyes, her reasons for declining probably aren t acceptable to request another transfer. Fannie will call patient to discuss options. Could get on Section 8 wait list but this is a long term goal.      Assessment and Plan: Will call Sagrario next week to follow up. Fannie at [facility] will aslo call Sagrario.     TA [place]

## 2021-05-05 NOTE — PROGRESS NOTES
S: Sagrario Meyer is a 40 year old female with a PMH of   Patient Active Problem List   Diagnosis     Cystic acne     Latent tuberculosis     Redness of right eye     Chronic right-sided low back pain without sciatica     Vitamin D deficiency     AMA (advanced maternal age) multigravida 35+     Subserous leiomyoma of uterus     Supervision of high risk pregnancy BILLIE SHIPLEY care     Multigravida of advanced maternal age in third trimester     Labor and delivery, indication for care     S/P  section     Hazards in home     presenting to clinic today with a chief complaint of ***.      Social: fear of neighbors.    Headaches started- took tylenol,  took twice.     Chest pain- feeling after eating.         Current Outpatient Medications   Medication Sig Dispense Refill     hydrocortisone 2.5 % cream Apply to face 2 to 3 times a week. From Dermatology.         O: /76 (BP Location: Left arm, Patient Position: Sitting, Cuff Size: Adult Regular)   Pulse 85   Temp 98  F (36.7  C) (Oral)   Resp 16   SpO2 99%    Gen:  Well nourished and in no acute distress ***  HEENT: PERRL; TMs normal color and landmarks; nasopharynx pink and moist; oropharynx pink and moist  Neck: supple without lymphadenopathy  CV:  RRR  - no murmurs noted   Pulm:  CTAB, no wheezes or crackles noted, good air entry   ABD: soft, nontender, no masses, no rebound, BS intact throughout, no hepatosplenomegaly  Extrem: no cyanosis, edema or clubbing  Psych: Euthymic      Assessment and Plan:  There are no diagnoses linked to this encounter.    This patient was seen and discussed with  *** who agrees with the assessment and plan.     Maeve Hernandez MD MD PGY2  [facility]
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
## 2021-05-06 ENCOUNTER — TELEPHONE (OUTPATIENT)
Dept: FAMILY MEDICINE | Facility: CLINIC | Age: 41
End: 2021-05-06
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
## 2021-05-07 NOTE — PROGRESS NOTES
Preceptor Attestation:    I discussed the patient with the resident and evaluated the patient in person. I have verified the content of the note, which accurately reflects my assessment of the patient and the plan of care.   Supervising Physician:  Zackary Bledsoe MD.

## 2021-05-12 ENCOUNTER — TELEPHONE (OUTPATIENT)
Dept: FAMILY MEDICINE | Facility: CLINIC | Age: 41
End: 2021-05-12
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
## 2021-05-19 ENCOUNTER — TELEPHONE (OUTPATIENT)
Dept: FAMILY MEDICINE | Facility: CLINIC | Age: 41
End: 2021-05-19

## 2021-05-19 NOTE — TELEPHONE ENCOUNTER
[facility] phone call message- general phone call:    Reason for call: Pt is calling to speak with  regarding concerns she has and how pt was suppose to receive call from .     Return call needed: Yes    OK to leave a message on voice mail? Yes    Primary language: English      needed? No    Call taken on May 19, 2021 at 3:21 PM by Grisel Flores-Cardona
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
## 2021-05-26 VITALS — HEART RATE: 85 BPM | SYSTOLIC BLOOD PRESSURE: 125 MMHG | DIASTOLIC BLOOD PRESSURE: 77 MMHG

## 2021-05-26 DIAGNOSIS — E55.9 VITAMIN D DEFICIENCY: Primary | ICD-10-CM

## 2021-05-27 DIAGNOSIS — F99 INSOMNIA DUE TO OTHER MENTAL DISORDER: ICD-10-CM

## 2021-05-27 DIAGNOSIS — F51.05 INSOMNIA DUE TO OTHER MENTAL DISORDER: ICD-10-CM

## 2021-05-27 DIAGNOSIS — G44.209 TENSION HEADACHE: ICD-10-CM

## 2021-05-27 RX ORDER — CHOLECALCIFEROL (VITAMIN D3) 50 MCG
TABLET ORAL
Qty: 90 TABLET | Refills: 4 | Status: SHIPPED | OUTPATIENT
Start: 2021-05-27 | End: 2022-10-25

## 2021-05-27 RX ORDER — HYDROXYZINE HYDROCHLORIDE 25 MG/1
25 TABLET, FILM COATED ORAL
Qty: 30 TABLET | Refills: 1 | Status: SHIPPED | OUTPATIENT
Start: 2021-05-27 | End: 2021-06-29

## 2021-05-28 ENCOUNTER — TELEPHONE (OUTPATIENT)
Dept: FAMILY MEDICINE | Facility: CLINIC | Age: 41
End: 2021-05-28

## 2021-05-28 NOTE — TELEPHONE ENCOUNTER
[facility] phone call message- general phone call:    Reason for call: She needs a call back re getting a letter for her employer.    Action desired: call back.    Return call needed: Yes    OK to leave a message on voice mail? Yes    Advised patient to response may take up to 2 business days: Yes    Primary language: English      needed? No    Call taken on May 28, 2021 at 1:20 PM by Graham Dupree

## 2021-05-28 NOTE — TELEPHONE ENCOUNTER
Please let the patient know:  Dr. Webb returns Wednesday June 2nd and will address this message then.    Let me know if action is needed prior to this.    Mariano Garner MD

## 2021-05-28 NOTE — TELEPHONE ENCOUNTER
Patient states she has re applied for unemployment. She has a small child and no  due to covid and at this time no family support. Unemployment is requesting a letter from PCP stating reasons it is beneficial she not work at this time    Note routed to Dr.Wicks Ernestina LINDSEY

## 2021-05-29 NOTE — PROGRESS NOTES
[facility]  Genetic Counseling Consult    Patient: Sagrario Meyer YOB: 1980   Date of Service: 2019      Sagrario Meyer was seen at [facility] for genetic consultation to discuss the options for screening and testing for fetal chromosome abnormalities.  The indication for genetic counseling is advanced maternal age.        Impression/Plan:   1.  Sagrario had a genetic counseling session and NT ultrasound today.  She declined the offered maternal serum screening options.     2.  Maternal serum AFP (single marker screen) is recommended after 15 weeks to screen for open neural tube defects. A quad screen should not be performed unless Sagrario changes her mind and decides she would like to pursue aneuploidy screening.    3.  An 18-20 week comprehensive ultrasound is standard of care for all women 35 or older at delivery.    Pregnancy History:   /Parity:    Age at Delivery: 39 y.o.  RIO: 2019, by Last Menstrual Period  Gestational Age: 12w4d    No significant complications or exposures were reported in the current pregnancy.    Medical History:   Sagrario s reported medical history is not expected to impact pregnancy management or risks to fetal development.       Family History:   A three-generation pedigree was obtained, and is scanned under the  Media  tab.     The available family history details are limited, as Sagrario reports that in her culture it is considered improper to ask for details surrounding medical concerns. Therefore her knowledge is mostly limited to whether or not family members are living. Nevertheless, the reported family history is negative for multiple miscarriages, stillbirths, birth defects, intellectual disability, known genetic conditions, and consanguinity.       Carrier Screening:   The patient reports that she and the father of the pregnancy have  ancestry:      The  hemoglobinopathies are a group of genetic blood diseases that occur with increased frequency in individuals of  ancestry and carrier screening for these conditions is available.  Carrier screening for the hemoglobinopathies includes a CBC with red blood cell indices, a ferritin level, and a quantitative hemoglobin electrophoresis or HPLC.  In addition,  screening in the [facility] includes many of the hemoglobinopathies.             Expanded carrier screening for mutations in a large panel of genes associated with autosomal recessive conditions including cystic fibrosis, spinal muscular atrophy, and others, is now available.      Carrier screening was not discussed today.       Risk Assessment for Chromosome Conditions:   We explained that the risk for fetal chromosome abnormalities increases with maternal age. We discussed specific features of common chromosome abnormalities, including Down syndrome, trisomy 13, trisomy 18, and sex chromosome trisomies.      - At age 40 at midtrimester, the risk to have a baby with Down syndrome is 1 in 74.     - At age 40 at midtrimester, the risk to have a baby with any chromosome abnormality is 1 in 40.          Testing Options:   We discussed the following options:   First trimester screening    First trimester ultrasound with nuchal translucency and nasal bone assessments, maternal plasma hCG, BEKA-A, and AFP measurement    Screens for fetal trisomy 21, trisomy 13, and trisomy 18    Cannot screen for open neural tube defects; maternal serum AFP after 15 weeks is recommended    ,  Non-invasive Prenatal Testing (NIPT)    Maternal plasma cell-free DNA testing; first trimester ultrasound with nuchal translucency and nasal bone assessment is recommended, when appropriate    Screens for fetal trisomy 21, trisomy 13, trisomy 18, and sex chromosome aneuploidy    Cannot screen for open neural tube defects; maternal serum AFP after 15 weeks is recommended      ,   Quad screen:     Maternal plasma AFP, hCG, estriol, and inhibin measurement between 15-24 weeks gestation    Provides risk assessment for fetal Down syndrome, trisomy 18, and neural tube defects   and  Comprehensive (Level II) ultrasound: Detailed ultrasound performed between 18-22 weeks gestation to screen for major birth defects and markers for aneuploidy.    We reviewed the benefits and limitations of this testing.  Screening tests provide a risk assessment specific to the pregnancy for certain fetal chromosome abnormalities, but cannot definitively diagnose or exclude a fetal chromosome abnormality.  Follow-up genetic counseling and consideration of diagnostic testing is recommended with any abnormal screening result.     Diagnostic tests carry inherent risks- including risk of miscarriage- that require careful consideration.  These tests can detect fetal chromosome abnormalities with greater than 99% certainty.  Results can be compromised by maternal cell contamination or mosaicism, and are limited by the resolution of cytogenetic G-banding technology.  There is no screening nor diagnostic test that can detect all forms of birth defects or mental disability.     It was a pleasure to be involved with Sagrario s care. Face-to-face time of the meeting was 45 minutes.    Mariama Galvez MS, [facility]  Licensed Genetic Counselor   [facility]   M[facility] Centers  [email or web address]   [place] Office: [phone]   [facility] [phone]  [facility] Office: [phone]  Pager: [phone] Fax: [phone]

## 2021-05-29 NOTE — PROGRESS NOTES
"Please see \"Imaging\" tab under \"Chart Review\" for details of today's visit.    Shannon House        "

## 2021-05-31 NOTE — PROGRESS NOTES
Please see full MFM US report under Epic Imaging tab.     Williams Stacy MD  Maternal - Fetal Medicine  [facility]

## 2021-06-02 NOTE — TELEPHONE ENCOUNTER
Discussed with Dr Webb and Dr Webb states that there is not a medical reason for pt not to be able to return to work.    Called pt and gave her info.  She states that she has applied for day care assistance as she is unable to afford day care when her work is only allowing her to return part time at this time.  She states that if she could go to full time then she could afford to put him in day care.  She states that she is waiting to hear if she has been approved for day care assistance and then will go back to work.  Asked pt if she has received the COVID vaccine and she states that she is reluctant since she is still breastfeeding and Dr Webb told her that it is not well known if there is any risks to the baby.  She states that she will discuss this more with Dr Webb at her appt on Friday and told her that if she would like the vaccine then it could be given at the appt.  Pt verbalized understanding.  Routed note to Dr Webb./JAVY

## 2021-06-02 NOTE — TELEPHONE ENCOUNTER
Pt is calling back today and states that the deadline to turn in the doctor's letter is tomorrow.  She is wondering if Dr Webb could call her today as her appt is not until Friday, 6/4 and Dr Webb has no openings tomorrow?  Routed note to Dr Webb./JAVY

## 2021-06-03 NOTE — PROGRESS NOTES
"Please see the \"Imaging\" tab for details of today's ultrasound.    Cyndee Foreman MD  Specialist in Maternal-Fetal Medicine    "

## 2021-06-04 ENCOUNTER — OFFICE VISIT (OUTPATIENT)
Dept: FAMILY MEDICINE | Facility: CLINIC | Age: 41
End: 2021-06-04
Payer: COMMERCIAL

## 2021-06-04 VITALS
RESPIRATION RATE: 16 BRPM | OXYGEN SATURATION: 98 % | BODY MASS INDEX: 23.65 KG/M2 | TEMPERATURE: 98.6 F | SYSTOLIC BLOOD PRESSURE: 111 MMHG | HEART RATE: 79 BPM | DIASTOLIC BLOOD PRESSURE: 73 MMHG | WEIGHT: 137.8 LBS

## 2021-06-04 DIAGNOSIS — Z00.00 ENCOUNTER FOR PREVENTIVE CARE: Primary | ICD-10-CM

## 2021-06-04 DIAGNOSIS — F43.23 ADJUSTMENT DISORDER WITH MIXED ANXIETY AND DEPRESSED MOOD: Primary | ICD-10-CM

## 2021-06-04 PROCEDURE — 99213 OFFICE O/P EST LOW 20 MIN: CPT | Performed by: FAMILY MEDICINE

## 2021-06-04 RX ORDER — PRENATAL VIT/IRON FUM/FOLIC AC 27MG-0.8MG
TABLET ORAL
Qty: 30 TABLET | Refills: 12 | Status: SHIPPED | OUTPATIENT
Start: 2021-06-04 | End: 2022-10-25

## 2021-06-04 NOTE — PROGRESS NOTES
"Please see \"Imaging\" tab under Chart Review for full report.  This ultrasound was performed in the [facility], and may be located under Care Everywhere.    Yessi Beard MD  Maternal Fetal Medicine    "
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
## 2021-06-04 NOTE — PROGRESS NOTES
"Please see \"Imaging\" tab under Chart Review for full report.  This ultrasound was performed in the [facility], and may be located under Care Everywhere.    Yessi Beard MD  Maternal Fetal Medicine    "

## 2021-06-07 ENCOUNTER — DOCUMENTATION ONLY (OUTPATIENT)
Dept: PSYCHOLOGY | Facility: CLINIC | Age: 41
End: 2021-06-07

## 2021-06-07 NOTE — PROGRESS NOTES
Behavioral Health Team,    Patient is being referred for mental health services by their provider, Dr. Webb.  Please advise if we are able to see patient for in house treatment or if a community option would be best.    Thank you,    Nichol Loza  6/7/2021

## 2021-06-07 NOTE — PATIENT INSTRUCTIONS
06/07/21   MENTAL HEALTH REFERRAL     Mental Health referral routed to behavioral health team for recommendations. See Documentation Only encounter for more information.     Nichol Loza

## 2021-06-08 NOTE — PROGRESS NOTES
Review of Dr. Webb's order and note indicates that this is a referral for therapy to address depressed mood, anxiety and poor sleep.  Short term care Ok per Dr. Webb.  No safety concerns noted at the last visit.    PHQ 11/11/2019 1/16/2020 5/5/2021   PHQ-9 Total Score 2 1 13   Q9: Thoughts of better off dead/self-harm past 2 weeks Not at all Not at all Not at all   F/U: Thoughts of suicide or self-harm - - -   F/U: Safety concerns - - -     Per review of the schedule today, both Dr. Curiel and Dr. Luis appear to have space in the coming weeks to  this patient.  Will ask our referral team to reach out to offer a visit with one of the fellows.  Ok to use [facility] slots for Dr. Curiel.  Patient is scheduled for follow up with Dr. Webb on 6/18/21 if we have difficulty reaching her via phone to set this up.  Ideally, initial visit would be 60 minute in person or video visit.  If the fellow schedule will not work for Ms. Meyer for whatever reason, we can offer the following community based options for care:    [facility] of [address] [address]  [phone] (for appointments)  Fax: [phone]  COVID-19 Update 3-: [facility] at [facility] are taking new patients but doing all visits by telephone or video. See this website for up to date changes: [email or web address]  [facility] - [address]  [place] [address]  Phone:  [phone]  Fax: [phone]  Hours:  Monday - Friday 8:30 - 5:00pm    COVID-19 Update 3-: [facility] at [facility] are taking new patients but doing all visits by telephone or video. See this website for up to date changes: [email or web address]    [facility]  [address] [place] [address] [address]  [place] [address]  [phone]  COVID-19 Update 3- :  Unable to reach by phone but website has the following update: In response to the COVID-19 going around we are utilizing V-See as a Telehealth alternative to in clinic visits. This does not mean our clinic is closed but that individuals may opt to utilize this service to lower risk factors of garcía COVID-19. A separate consent form needs to be completed to use telehealth. See [email or web address] for details.    [employer]  [address].  [address]  [place], [place] [address]  [phone]  COVID-19 Update 03-: Due to the COVID-19 Virus, [facility] is offering only HIPPA secure Telehealth care at this time. In order to revive care, you ll need to call the clinic at [phone] and provide your email to clinic support staff.    [facility]  [address]  [place] [address]  [phone]  COVID-19 Update 3-:  [facility] and Urgent care are both operating but screening for symptoms prior to seeing anyone in person.   Only providing psychiatry services to uninsured patients at this time.    Let me know if you have questions or would like additional follow up from me. Thanks!      Regina Evans, Ph.D.,     Disclaimer  The above treatment recommendations are based on consultation with the patient's primary care provider and a review of relevant information in EPIC. I have not personally examined the patient. All recommendations should be implemented with considerations of the patient's relevant prior history and current clinical status. Please contact me with any questions about the care of this patient.

## 2021-06-11 DIAGNOSIS — R12 HEARTBURN: ICD-10-CM

## 2021-06-11 DIAGNOSIS — R11.0 NAUSEA: ICD-10-CM

## 2021-06-14 RX ORDER — FAMOTIDINE 40 MG/1
TABLET, FILM COATED ORAL
Qty: 90 TABLET | Refills: 1 | Status: SHIPPED | OUTPATIENT
Start: 2021-06-14 | End: 2021-09-14

## 2021-06-18 ENCOUNTER — OFFICE VISIT (OUTPATIENT)
Dept: FAMILY MEDICINE | Facility: CLINIC | Age: 41
End: 2021-06-18
Payer: COMMERCIAL

## 2021-06-18 VITALS
TEMPERATURE: 98.5 F | WEIGHT: 140.4 LBS | OXYGEN SATURATION: 98 % | BODY MASS INDEX: 24.1 KG/M2 | RESPIRATION RATE: 16 BRPM | HEART RATE: 85 BPM | DIASTOLIC BLOOD PRESSURE: 73 MMHG | SYSTOLIC BLOOD PRESSURE: 112 MMHG

## 2021-06-18 DIAGNOSIS — Z59.19 HAZARDS IN HOME: ICD-10-CM

## 2021-06-18 DIAGNOSIS — F43.23 ADJUSTMENT DISORDER WITH MIXED ANXIETY AND DEPRESSED MOOD: Primary | ICD-10-CM

## 2021-06-18 DIAGNOSIS — Z77.128 HAZARDS IN HOME: ICD-10-CM

## 2021-06-18 PROCEDURE — 99213 OFFICE O/P EST LOW 20 MIN: CPT | Performed by: FAMILY MEDICINE

## 2021-06-18 SDOH — ECONOMIC STABILITY - HOUSING INSECURITY: OTHER INADEQUATE HOUSING: Z59.19

## 2021-06-18 ASSESSMENT — ANXIETY QUESTIONNAIRES
3. WORRYING TOO MUCH ABOUT DIFFERENT THINGS: NEARLY EVERY DAY
7. FEELING AFRAID AS IF SOMETHING AWFUL MIGHT HAPPEN: MORE THAN HALF THE DAYS
5. BEING SO RESTLESS THAT IT IS HARD TO SIT STILL: SEVERAL DAYS
IF YOU CHECKED OFF ANY PROBLEMS ON THIS QUESTIONNAIRE, HOW DIFFICULT HAVE THESE PROBLEMS MADE IT FOR YOU TO DO YOUR WORK, TAKE CARE OF THINGS AT HOME, OR GET ALONG WITH OTHER PEOPLE: VERY DIFFICULT
2. NOT BEING ABLE TO STOP OR CONTROL WORRYING: MORE THAN HALF THE DAYS
6. BECOMING EASILY ANNOYED OR IRRITABLE: MORE THAN HALF THE DAYS
1. FEELING NERVOUS, ANXIOUS, OR ON EDGE: MORE THAN HALF THE DAYS
GAD7 TOTAL SCORE: 14

## 2021-06-18 ASSESSMENT — PATIENT HEALTH QUESTIONNAIRE - PHQ9: 5. POOR APPETITE OR OVEREATING: MORE THAN HALF THE DAYS
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
## 2021-06-18 NOTE — LETTER
2021      Sagrario AUGUSTE Betsy  [address]   [place] [address]        To Whom It May Concern,    Sagrario Meyer DOB 1980 is my patient.  She has a medical condition that requires surgery to treat. She has no family in this country to help take care of her son, Lorrie Lang  2019, who is only 15 months old, while she has surgery and is recovering.  Her  and father of her child is in [place] and wishes to come to the [place] to help her take care of their son so she can have the surgery. She will continue to have pain and potential complications, iIncluding potential severe bleeding, from her medical condition if this surgery is not done.    Sincerely,    Dee Webb MD

## 2021-06-19 ASSESSMENT — ANXIETY QUESTIONNAIRES: GAD7 TOTAL SCORE: 14

## 2021-06-24 ENCOUNTER — OFFICE VISIT (OUTPATIENT)
Dept: FAMILY MEDICINE | Facility: CLINIC | Age: 41
End: 2021-06-24
Payer: COMMERCIAL

## 2021-06-24 VITALS
SYSTOLIC BLOOD PRESSURE: 117 MMHG | TEMPERATURE: 98.6 F | HEART RATE: 82 BPM | OXYGEN SATURATION: 99 % | RESPIRATION RATE: 16 BRPM | DIASTOLIC BLOOD PRESSURE: 76 MMHG

## 2021-06-24 DIAGNOSIS — J06.9 VIRAL URI WITH COUGH: Primary | ICD-10-CM

## 2021-06-24 DIAGNOSIS — Z20.822 ENCOUNTER FOR LABORATORY TESTING FOR COVID-19 VIRUS: ICD-10-CM

## 2021-06-24 DIAGNOSIS — R11.2 NAUSEA AND VOMITING, INTRACTABILITY OF VOMITING NOT SPECIFIED, UNSPECIFIED VOMITING TYPE: ICD-10-CM

## 2021-06-24 PROBLEM — F43.23 ADJUSTMENT DISORDER WITH MIXED ANXIETY AND DEPRESSED MOOD: Status: ACTIVE | Noted: 2021-06-24

## 2021-06-24 LAB
SARS-COV-2 RNA RESP QL NAA+PROBE: NORMAL
SPECIMEN SOURCE: NORMAL

## 2021-06-24 PROCEDURE — 99213 OFFICE O/P EST LOW 20 MIN: CPT | Mod: GC | Performed by: STUDENT IN AN ORGANIZED HEALTH CARE EDUCATION/TRAINING PROGRAM

## 2021-06-24 RX ORDER — ONDANSETRON 4 MG/1
4 TABLET, FILM COATED ORAL EVERY 8 HOURS PRN
Qty: 15 TABLET | Refills: 0 | Status: SHIPPED | OUTPATIENT
Start: 2021-06-24 | End: 2023-02-01

## 2021-06-24 RX ORDER — BENZONATATE 200 MG/1
200 CAPSULE ORAL 3 TIMES DAILY PRN
Qty: 30 CAPSULE | Refills: 0 | Status: SHIPPED | OUTPATIENT
Start: 2021-06-24 | End: 2021-08-12

## 2021-06-24 RX ORDER — ACETAMINOPHEN 325 MG/1
325-650 TABLET ORAL EVERY 6 HOURS PRN
Qty: 60 TABLET | Refills: 0 | Status: SHIPPED | OUTPATIENT
Start: 2021-06-24 | End: 2022-12-09
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
## 2021-06-25 LAB
LABORATORY COMMENT REPORT: NORMAL
SARS-COV-2 RNA RESP QL NAA+PROBE: NEGATIVE
SPECIMEN SOURCE: NORMAL

## 2021-06-25 ASSESSMENT — PATIENT HEALTH QUESTIONNAIRE - PHQ9: SUM OF ALL RESPONSES TO PHQ QUESTIONS 1-9: 5

## 2021-06-26 DIAGNOSIS — G44.209 TENSION HEADACHE: ICD-10-CM

## 2021-06-26 DIAGNOSIS — F99 INSOMNIA DUE TO OTHER MENTAL DISORDER: ICD-10-CM

## 2021-06-26 DIAGNOSIS — F51.05 INSOMNIA DUE TO OTHER MENTAL DISORDER: ICD-10-CM

## 2021-06-29 ENCOUNTER — OFFICE VISIT (OUTPATIENT)
Dept: PSYCHOLOGY | Facility: CLINIC | Age: 41
End: 2021-06-29
Payer: COMMERCIAL

## 2021-06-29 DIAGNOSIS — F43.23 ADJUSTMENT DISORDER WITH MIXED ANXIETY AND DEPRESSED MOOD: Primary | ICD-10-CM

## 2021-06-29 PROCEDURE — 90837 PSYTX W PT 60 MINUTES: CPT | Mod: HN | Performed by: STUDENT IN AN ORGANIZED HEALTH CARE EDUCATION/TRAINING PROGRAM

## 2021-06-29 RX ORDER — HYDROXYZINE HYDROCHLORIDE 25 MG/1
TABLET, FILM COATED ORAL
Qty: 30 TABLET | Refills: 4 | Status: SHIPPED | OUTPATIENT
Start: 2021-06-29 | End: 2023-02-01
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
## 2021-06-29 NOTE — PROGRESS NOTES
Preceptor Attestation:    I discussed the patient with the resident and evaluated the patient in person. I have verified the content of the note, which accurately reflects my assessment of the patient and the plan of care.   Supervising Physician:  Dewey Potter MD.

## 2021-06-29 NOTE — PATIENT INSTRUCTIONS
It was a pleasure to work with you today Sagrario!    The homework and goals we discussed today:  1) Practice Deep breathing or STOP    Our next scheduled appointment(s): 7/6/2021 @ 10:00 AM in clinic    If you need to change a future appointment for any reason, the most efficient way to do so is to call the  at [phone].    Carlos Luis, Pschris.RICHARD.  they/them/theirs  Primary Care Behavioral Health Fellow    [facility]  Behavioral Health  [phone]    First Session Patient Information Sheet    Hello! My name is Carlos Luis.  I earned my doctorate in Counseling Psychology at [employer]. I am currently in a postdoctoral fellowship here at the [facility] to receive specialized training. The focus of my specialized training is behavioral health in primary care. I am also working to complete my supervised hours to become a Licensed Psychologist in the [facility].     My direct supervisor at [place] is Dr. Regina Evans, Ph.D. She is a Licensed Psychologist. I meet with her individually for supervision of my training and clinical caseload. Just like the residents you may have worked with, the work you and I complete together will be billed under my supervisor's name. Therefore, you will likely see reports from your insurance with Dr. Evans's name rather than mine. She can be reached at [phone] if you have any questions or concerns.       Your first 1-2 sessions are focused on assessment. That means I will be finding out what brought you to the clinic and what you are hoping to get out of therapy. I will also ask you a lot of questions about your life including questions about your relationships, emotions, behaviors, experiences, childhood, and family. I may also ask you to complete several questionnaires and checklists as part of that assessment process.      After the assessment is completed, we will discuss the information you gave me and work together to develop a  plan for your therapy. Part of our discussion will include the most appropriate therapist to provide you with services. Most times, I will continue with your care unless it becomes clear that we need to refer you to a different therapist who can better meet your specific needs.      Most patients attend appointments once a week or once every other week. However, we will discuss a schedule that best fits your needs.       Due to confidentiality rules, I cannot exchange e-mail with patients. If you need to reach me, please call [phone].      I am committed to providing my patients with the best care possible. That means I will provide you with therapy that is the best fit for your unique needs and preferences. If there is anything you would like me to do differently, please let me know at any time.    Thank you, and I look forward to working with you!    Carlos Luis Psy.D.  they/them/theirs  Primary Care Behavioral Health Fellow      Ask yourself  Is there an immediate threat? If so, what can I do right now to remove myself from danger?     OR     Am I simply feeling anxiety about what might happen? Knowing that worrying will not help, what can I do to calm down and take care of myself in this moment?     Meeting your anxiety with a nonjudgmental, friendly attitude - like you would address a good friend - can help you see more clearly the best way to proceed.     S.T.O.P.  An exercise that can be done anytime you feel yourself spinning out is S.T.O.P.:     Slow down  Take a deep breath (with a slightly longer exhale)  Observe the sensations in your body, without thinking they are right or wrong  Process possibilities for action, and proceed

## 2021-07-06 ENCOUNTER — OFFICE VISIT (OUTPATIENT)
Dept: PSYCHOLOGY | Facility: CLINIC | Age: 41
End: 2021-07-06
Payer: COMMERCIAL

## 2021-07-06 DIAGNOSIS — F33.1 MODERATE EPISODE OF RECURRENT MAJOR DEPRESSIVE DISORDER (H): ICD-10-CM

## 2021-07-06 DIAGNOSIS — F43.10 POST TRAUMATIC STRESS DISORDER DUE TO WAR, TERRORISM, OR HOSTILITY: Primary | ICD-10-CM

## 2021-07-06 PROCEDURE — 90791 PSYCH DIAGNOSTIC EVALUATION: CPT | Mod: HN | Performed by: STUDENT IN AN ORGANIZED HEALTH CARE EDUCATION/TRAINING PROGRAM

## 2021-07-06 NOTE — PROGRESS NOTES
I have reviewed and agree with the behavioral health fellow's documentation for this visit.  I did not personally see the patient.  Regina Evans, PhD., LP
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
## 2021-07-16 ENCOUNTER — VIRTUAL VISIT (OUTPATIENT)
Dept: FAMILY MEDICINE | Facility: CLINIC | Age: 41
End: 2021-07-16
Payer: COMMERCIAL

## 2021-07-16 DIAGNOSIS — R43.0 ANOSMIA: Primary | ICD-10-CM

## 2021-07-16 DIAGNOSIS — F32.1 CURRENT MODERATE EPISODE OF MAJOR DEPRESSIVE DISORDER WITHOUT PRIOR EPISODE (H): ICD-10-CM

## 2021-07-16 PROCEDURE — 96127 BRIEF EMOTIONAL/BEHAV ASSMT: CPT | Performed by: FAMILY MEDICINE

## 2021-07-16 PROCEDURE — 99214 OFFICE O/P EST MOD 30 MIN: CPT | Mod: 95 | Performed by: FAMILY MEDICINE

## 2021-07-16 RX ORDER — SERTRALINE HYDROCHLORIDE 25 MG/1
25 TABLET, FILM COATED ORAL DAILY
Qty: 30 TABLET | Refills: 0 | Status: SHIPPED | OUTPATIENT
Start: 2021-07-16 | End: 2021-08-12

## 2021-07-16 RX ORDER — FLUTICASONE PROPIONATE 50 MCG
2 SPRAY, SUSPENSION (ML) NASAL DAILY
Qty: 16 G | Refills: 11 | Status: SHIPPED | OUTPATIENT
Start: 2021-07-16 | End: 2023-02-01

## 2021-07-16 ASSESSMENT — ANXIETY QUESTIONNAIRES
IF YOU CHECKED OFF ANY PROBLEMS ON THIS QUESTIONNAIRE, HOW DIFFICULT HAVE THESE PROBLEMS MADE IT FOR YOU TO DO YOUR WORK, TAKE CARE OF THINGS AT HOME, OR GET ALONG WITH OTHER PEOPLE: EXTREMELY DIFFICULT
3. WORRYING TOO MUCH ABOUT DIFFERENT THINGS: NEARLY EVERY DAY
2. NOT BEING ABLE TO STOP OR CONTROL WORRYING: NEARLY EVERY DAY
6. BECOMING EASILY ANNOYED OR IRRITABLE: NEARLY EVERY DAY
1. FEELING NERVOUS, ANXIOUS, OR ON EDGE: NEARLY EVERY DAY
7. FEELING AFRAID AS IF SOMETHING AWFUL MIGHT HAPPEN: NEARLY EVERY DAY
GAD7 TOTAL SCORE: 21
5. BEING SO RESTLESS THAT IT IS HARD TO SIT STILL: NEARLY EVERY DAY

## 2021-07-16 ASSESSMENT — PATIENT HEALTH QUESTIONNAIRE - PHQ9
5. POOR APPETITE OR OVEREATING: NEARLY EVERY DAY
SUM OF ALL RESPONSES TO PHQ QUESTIONS 1-9: 12
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
## 2021-07-17 ASSESSMENT — ANXIETY QUESTIONNAIRES: GAD7 TOTAL SCORE: 21

## 2021-07-26 ENCOUNTER — OFFICE VISIT (OUTPATIENT)
Dept: PSYCHOLOGY | Facility: CLINIC | Age: 41
End: 2021-07-26
Payer: COMMERCIAL

## 2021-07-26 DIAGNOSIS — F43.10 POST TRAUMATIC STRESS DISORDER DUE TO WAR, TERRORISM, OR HOSTILITY: Primary | ICD-10-CM

## 2021-07-26 DIAGNOSIS — F33.1 MODERATE EPISODE OF RECURRENT MAJOR DEPRESSIVE DISORDER (H): ICD-10-CM

## 2021-07-26 PROCEDURE — 90834 PSYTX W PT 45 MINUTES: CPT | Mod: HN | Performed by: STUDENT IN AN ORGANIZED HEALTH CARE EDUCATION/TRAINING PROGRAM
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
## 2021-07-26 NOTE — PATIENT INSTRUCTIONS
It was a pleasure to work with you today Sagrario!    The homework and goals we discussed today:  1) Create a routine for sleep.     Our next scheduled appointment(s): 7/10/2021 @ 4 PM in clinic    If you need to change a future appointment for any reason, the most efficient way to do so is to call the  at [phone].    Carlos Luis Psy.D.  they/them/theirs  Primary Care Behavioral Health Fellow  ^^^^^^^^^^^^^^^^^^^^^^^^^^^  Your safety as well as the safety of those around you is important to us. Should you or someone else be in need of them, here are some additional resources:     [facility] Adult Mental Health Crisis:  [phone]  [facility] Childrens Mental Health Crisis: [phone]    [facility] Adult Mental Health Crisis: [phone]  [facility] Children's Mental Health Crisis: [phone]    [facility] Multilingual Crisis Line:  [phone]     Crisis Text Line: People who text [place] to 228987 will be connected with a counselor.     [place] Domestic Violence Crisis Line (24-hour [place] crisis line) 1-703.454.1547    Find a local Alcohol or Narcotics Anonymous meeting:  Https://aam[place].org/  https://www.nam[place].org/    If you feel at risk of immediate harm, call 9-1-1 or go directly to the Emergency Department.  ^^^^^^^^^^^^^^^^^^^^^^^^^^^^^^    Sleep Restriction: One of the Keys to Changing Your Sleep Behavior    What Is It?  Sleep restriction involves restricting the amount of time you spend in bed to the amount of time that you currently spend asleep.    Why Would This Be Helpful?   Research has shown sleep restriction to be an effective technique for improving sleep. In general, most people notice their sleep improves within just a few weeks. Sleep restriction initially produces a mild state of sleep deprivation, which helps people fall asleep faster, stay asleep longer, and improves their overall quality of sleep.    How Do I Do It?  Consider the following  example: Your usual bedtime is 10:00 p.m. and you get out of bed in the morning at 6:00 a.m. (8 hours in bed). However, it takes you 1 hour to fall asleep, and you wake up for 30 minutes in the middle of the night, and 30 minutes before you get out of bed. Therefore, you spend 6 hours sleeping and 2 hours awake. Your sleep efficiency (the percentage of time you are actually asleep during the time period you are trying to sleep) is 75%. Sleep restriction in this case would mean decreasing time in bed (8 hours) to the estimated time actually spent sleeping (6 hours).  In this example, you would adjust either your bedtime or the time you get up in the morning, so that the maximum amount of time you spend in bed is 6 hours. You could go to bed at 12:00 midnight and get up at 6:00 a.m., or continue to go to bed at 10:00 p.m. and get up at 4:00 a.m. After sleep efficiency reaches 85% or greater, time in bed can be increased in 15 to 20 minute blocks. Time in bed each week is increased if 85% sleep efficiency or greater is achieved. This pattern continues until sleep efficiency starts to fall below 80%, at which point time in bed is decreased by 15 to 20 minute blocks. This process of increasing or decreasing time in bed is continued until sleep efficiency falls between 80% and 85% on a regular basis.    MARQUITA Edwards., RACHELE Christensen., German, M. S., & CLAUDIA Grier. (2009). Integrated Behavioral [place] in Primary Care: Step-by-Step Guidance for Assessment and Intervention. American Psychological Association.                                         Improving Sleep Through Behavior Change                                                   Stimulus Control Procedures      Go to Bed Only When You Are Sleepy: The longer you are in bed, the more the bed is associated with a place to be awake instead of asleep. Delay bedtime until sleepy.     Get Out of Bed When You Can t Fall Asleep or Go Back to Sleep in About 15 Minutes: Get out  of bed if you don t fall asleep fairly soon. Return to bed only when you are sleepy. When you feel sleepy, return to bed. The goal is to reconnect your bed with being asleep.    Use the Bed for Sleep and Sex Only: Do not watch TV, listen to the radio, eat, or read in your bed or bedroom.                                                       Sleep Hygiene Guidelines    Caffeine: Avoid caffeine 6 to 8 hours before bedtime. Caffeine disturbs sleep. Thus, drinking caffeinated beverages should be avoided near bedtime.      Nicotine: Avoid nicotine before bedtime. Nicotine can keep you awake. Avoid tobacco near bedtime and during the night.       Alcohol: Avoid alcohol after dinner. Alcohol often promotes the onset of sleep, but interrupts your natural sleep pattern. Do not consume it any closer than 4 hours before going to bed.       Sleeping Pills: Sleep medications are effective only temporarily. Sleep medications lose their effectiveness in about 2 to 4 weeks when taken regularly. Over time, sleeping pills actually can make sleep problems worse; withdrawal from the medication can lead to an insomnia rebound. Keep use of sleeping pills infrequent, but don t worry if you need to use one on an occasional basis.      Regular Exercise: Do not exercise within 2 hours of bedtime as it may elevate nervous system activity and interfere with your ability to fall asleep       Bedroom Environment: Your bedroom should have a moderate temperature and be quiet and dark. Noises can be masked with background white noise (e.g., the noise of a fan) or with earplugs. Bedrooms may be darkened with blackout shades, or sleep masks can be worn.       Eating: A light bedtime snack, such a glass of warm milk, cheese, or a bowl of cereal can promote sleep. Avoid snacks in the middle of the night because awakening may become associated with hunger.       Avoid Naps: The sleep you obtain during the day takes away from the amount of sleep you need  that night. If you must nap, schedule it before 3:00 p.m. Don t sleep more than 15 to 30 minutes.      Allow Yourself at Least an Hour Before Bedtime to Unwind: Find what works for you to wind down, and perhaps give yourself an hour to do so.       Regular Sleep Schedule: Keep a regular time each day, 7 days a week, to get out of bed. Keeping a regular waking time helps set your circadian rhythm so that your body learns to sleep at the desired time.      Set a Reasonable Bedtime and Arising Time and Stick to Them: Set the alarm clock and get out of bed at the same time each morning, weekdays and weekends, regardless of your bedtime or the amount of sleep you obtained on the previous night. This guideline is designed to regulate your internal biological clock and reset your sleep-wake rhythm.    MARQUITA Edwards., RACHELE Christensen., German MDom S., & CLAUDIA Grier (2009). Integrated Behavioral [place] in Primary Care: Step-by-Step Guidance for Assessment and Intervention. American Psychological Association.     Treatment Plan     Client's Legal Name: Sagrario Meyer                         Client's Preferred Name: Sagrario  YOB: 1980  Today's Date: July 26, 2021     Next Treatment Plan Update Due: 10/26/2021  Next Diagnostic Update Due: 7/9/2022     DSM-V Diagnoses:   Posttraumatic Stress Disorder  Major Depressive Disorder, recurrent, mild     Psychosocial / Contextual Factors:   Sagrario is a 40 year old female who is being seen for problematic symptoms of hypervigilance, low appetite, insomnia, agitation, and anxiety secondary to concerns about the safety of the community around her apartment. She is a mother of one toddler and has a history of migrating to the  from [place]. Her  still lives in [place].     Functioning:    Sagrario's mental health concerns have been continuing to affect her ability to function in areas related to most areas of life including getting back to work, self care, parenting,  household management, finding new housing, and meaningful activities causing clinically significant distress.     PHQ 5/5/2021 6/18/2021 7/16/2021   PHQ-9 Total Score 13 5 12   Q9: Thoughts of better off dead/self-harm past 2 weeks Not at all Not at all Not at all   F/U: Thoughts of suicide or self-harm - - -   F/U: Safety concerns - - -      YANELY-7 SCORE 12/10/2019 6/18/2021 7/16/2021   Total Score 2 14 21      PTSD-PC = 4/4  CAGE AID = 0/4      Collaboration:  Primary Care Provider: Dee Webb     Referral:  None     Anticipated treatment duration: Unknown  Agreed upon meeting frequency: every 1-2 weeks     Long Term Treatment Goal(s) related to diagnosis / functional impairment(s):     Goal 1: Sagrario would like to get support around the stress she feels being in a dangerous community. She eventually wants to find new housing.     Steps we will take to achieve your goal:                  Patient will engage in person centered therapy helping her learn more about her mental health and processing distress while improving emotional literacy.     Intervention(s):  Therapist will provide support, psychoeducation and homework assignments as needed.     Goal 2: Sagrario would like to have some skills to manage her symptoms, specifically she would like to get better sleep.     Steps we will take to achieve your goal:                  Patient will learn distress tolerance as well as present moment awareness skills and develop confidence in their use through in-session and between-session practice of these skills.     Intervention(s):  Therapist will provide support, psychoeducation and homework assignments as needed.     If you need additional support and care during times that your therapist or PCP are not available, here are some additional resources for you:     [facility] Adult Crisis:  [phone]  [facility] Multilingual Crisis Line:  [phone]  [facility] Adult Crisis: [phone]     Crisis [place]: [place]  to 465970. Free support at your fingertips 24/7  People who text [place] to 589060 will be connected with a counselor. Crisis Text Line is available 24 hours a day, seven days a week.     You can also consider going to the [facility] at the following address.  Walk ins are welcome:     [address] [place] [address]   [phone] (for 24 hour crisis consultation)     Monday - Friday 8:00am - 7:00pm  Saturday:  11:00am - 3:00pm  Sunday and Holidays Closed     If you feel at risk of immediate harm, go directly to the Emergency Department.     Patient                         has reviewed and agreed to the above plan.     Carlos Luis PsyD                 July 26, 2021

## 2021-08-12 ENCOUNTER — OFFICE VISIT (OUTPATIENT)
Dept: FAMILY MEDICINE | Facility: CLINIC | Age: 41
End: 2021-08-12
Payer: COMMERCIAL

## 2021-08-12 VITALS
DIASTOLIC BLOOD PRESSURE: 70 MMHG | SYSTOLIC BLOOD PRESSURE: 102 MMHG | RESPIRATION RATE: 16 BRPM | WEIGHT: 139.4 LBS | BODY MASS INDEX: 23.93 KG/M2 | HEART RATE: 71 BPM | TEMPERATURE: 98 F

## 2021-08-12 DIAGNOSIS — F32.1 CURRENT MODERATE EPISODE OF MAJOR DEPRESSIVE DISORDER WITHOUT PRIOR EPISODE (H): ICD-10-CM

## 2021-08-12 PROCEDURE — 99213 OFFICE O/P EST LOW 20 MIN: CPT | Performed by: FAMILY MEDICINE

## 2021-08-12 RX ORDER — SERTRALINE HYDROCHLORIDE 25 MG/1
25 TABLET, FILM COATED ORAL DAILY
Qty: 90 TABLET | Refills: 1 | Status: SHIPPED | OUTPATIENT
Start: 2021-08-12 | End: 2023-02-01

## 2021-08-12 ASSESSMENT — PATIENT HEALTH QUESTIONNAIRE - PHQ9: SUM OF ALL RESPONSES TO PHQ QUESTIONS 1-9: 14
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
## 2021-09-14 DIAGNOSIS — R11.0 NAUSEA: ICD-10-CM

## 2021-09-14 DIAGNOSIS — R12 HEARTBURN: ICD-10-CM

## 2021-09-14 RX ORDER — FAMOTIDINE 40 MG/1
TABLET, FILM COATED ORAL
Qty: 180 TABLET | Refills: 3 | Status: SHIPPED | OUTPATIENT
Start: 2021-09-14 | End: 2022-12-09

## 2021-10-10 ENCOUNTER — HEALTH MAINTENANCE LETTER (OUTPATIENT)
Age: 41
End: 2021-10-10

## 2021-12-27 ENCOUNTER — OFFICE VISIT (OUTPATIENT)
Dept: FAMILY MEDICINE | Facility: CLINIC | Age: 41
End: 2021-12-27
Payer: COMMERCIAL

## 2021-12-27 VITALS
RESPIRATION RATE: 20 BRPM | TEMPERATURE: 97.7 F | BODY MASS INDEX: 24.2 KG/M2 | SYSTOLIC BLOOD PRESSURE: 112 MMHG | OXYGEN SATURATION: 99 % | WEIGHT: 141 LBS | DIASTOLIC BLOOD PRESSURE: 71 MMHG | HEART RATE: 71 BPM

## 2021-12-27 DIAGNOSIS — Z32.00 PREGNANCY EXAMINATION OR TEST, PREGNANCY UNCONFIRMED: ICD-10-CM

## 2021-12-27 DIAGNOSIS — Z32.00 PREGNANCY EXAMINATION OR TEST, PREGNANCY UNCONFIRMED: Primary | ICD-10-CM

## 2021-12-27 DIAGNOSIS — N91.1 SECONDARY AMENORRHEA: Primary | ICD-10-CM

## 2021-12-27 LAB
FSH SERPL-ACNC: 26.8 MIU/ML
HCG UR QL: NEGATIVE
PROLACTIN SERPL-MCNC: 8 NG/ML (ref 0–20)
TSH SERPL DL<=0.005 MIU/L-ACNC: 2.52 UIU/ML (ref 0.3–5)

## 2021-12-27 PROCEDURE — 81025 URINE PREGNANCY TEST: CPT

## 2021-12-27 PROCEDURE — 84443 ASSAY THYROID STIM HORMONE: CPT

## 2021-12-27 PROCEDURE — 84146 ASSAY OF PROLACTIN: CPT

## 2021-12-27 PROCEDURE — 83001 ASSAY OF GONADOTROPIN (FSH): CPT

## 2021-12-27 PROCEDURE — 36415 COLL VENOUS BLD VENIPUNCTURE: CPT

## 2021-12-27 PROCEDURE — 99213 OFFICE O/P EST LOW 20 MIN: CPT | Mod: GC

## 2021-12-27 NOTE — PROGRESS NOTES
Preceptor Attestation:    I discussed the patient with the resident and evaluated the patient in person. I have verified the content of the note, which accurately reflects my assessment of the patient and the plan of care.   Supervising Physician:  Ganga Momin MD.

## 2022-01-29 ENCOUNTER — HEALTH MAINTENANCE LETTER (OUTPATIENT)
Age: 42
End: 2022-01-29

## 2022-03-02 ENCOUNTER — LAB REQUISITION (OUTPATIENT)
Dept: LAB | Facility: CLINIC | Age: 42
End: 2022-03-02

## 2022-03-02 ENCOUNTER — HOSPITAL ENCOUNTER (OUTPATIENT)
Dept: GENERAL RADIOLOGY | Facility: CLINIC | Age: 42
Discharge: HOME | End: 2022-03-02
Attending: INTERNAL MEDICINE | Admitting: INTERNAL MEDICINE

## 2022-03-02 DIAGNOSIS — R76.11 POSITIVE PPD: ICD-10-CM

## 2022-03-02 PROCEDURE — 99207 XR CHEST 1 VIEW: CPT | Mod: 26 | Performed by: RADIOLOGY

## 2022-03-02 PROCEDURE — 71045 X-RAY EXAM CHEST 1 VIEW: CPT

## 2022-03-02 PROCEDURE — 86735 MUMPS ANTIBODY: CPT | Performed by: INTERNAL MEDICINE

## 2022-03-02 PROCEDURE — 86787 VARICELLA-ZOSTER ANTIBODY: CPT | Performed by: INTERNAL MEDICINE

## 2022-03-02 PROCEDURE — 86762 RUBELLA ANTIBODY: CPT | Performed by: INTERNAL MEDICINE

## 2022-03-02 PROCEDURE — 86706 HEP B SURFACE ANTIBODY: CPT | Performed by: INTERNAL MEDICINE

## 2022-03-02 PROCEDURE — 87340 HEPATITIS B SURFACE AG IA: CPT | Performed by: INTERNAL MEDICINE

## 2022-03-02 PROCEDURE — 86765 RUBEOLA ANTIBODY: CPT | Performed by: INTERNAL MEDICINE

## 2022-03-03 LAB
HBV SURFACE AB SERPL IA-ACNC: >1000 M[IU]/ML
HBV SURFACE AG SERPL QL IA: NONREACTIVE
MEV IGG SER IA-ACNC: >300 AU/ML
MEV IGG SER IA-ACNC: POSITIVE
MUMPS ANTIBODY IGG INSTRUMENT VALUE: 158 AU/ML
MUV IGG SER QL IA: POSITIVE
RUBV IGG SERPL QL IA: 5.5 INDEX
RUBV IGG SERPL QL IA: POSITIVE
VZV IGG SER QL IA: 2337 INDEX
VZV IGG SER QL IA: POSITIVE

## 2022-03-23 ENCOUNTER — OFFICE VISIT (OUTPATIENT)
Dept: FAMILY MEDICINE | Facility: CLINIC | Age: 42
End: 2022-03-23
Payer: COMMERCIAL

## 2022-03-23 VITALS
SYSTOLIC BLOOD PRESSURE: 114 MMHG | RESPIRATION RATE: 16 BRPM | OXYGEN SATURATION: 100 % | HEART RATE: 73 BPM | DIASTOLIC BLOOD PRESSURE: 75 MMHG | TEMPERATURE: 97.9 F

## 2022-03-23 DIAGNOSIS — R31.0 GROSS HEMATURIA: Primary | ICD-10-CM

## 2022-03-23 DIAGNOSIS — N91.2 AMENORRHEA: ICD-10-CM

## 2022-03-23 LAB
ALBUMIN SERPL-MCNC: 4 G/DL (ref 3.5–5)
ALBUMIN UR-MCNC: NEGATIVE MG/DL
ALP SERPL-CCNC: 61 U/L (ref 45–120)
ALT SERPL W P-5'-P-CCNC: <9 U/L (ref 0–45)
ANION GAP SERPL CALCULATED.3IONS-SCNC: 9 MMOL/L (ref 5–18)
APPEARANCE UR: CLEAR
AST SERPL W P-5'-P-CCNC: 16 U/L (ref 0–40)
BACTERIA #/AREA URNS HPF: ABNORMAL /HPF
BILIRUB SERPL-MCNC: 0.6 MG/DL (ref 0–1)
BILIRUB UR QL STRIP: NEGATIVE
BUN SERPL-MCNC: 5 MG/DL (ref 8–22)
CALCIUM SERPL-MCNC: 9.7 MG/DL (ref 8.5–10.5)
CHLORIDE BLD-SCNC: 105 MMOL/L (ref 98–107)
CO2 SERPL-SCNC: 29 MMOL/L (ref 22–31)
COLOR UR AUTO: ABNORMAL
CREAT SERPL-MCNC: 0.62 MG/DL (ref 0.6–1.1)
ERYTHROCYTE [DISTWIDTH] IN BLOOD BY AUTOMATED COUNT: 11.6 % (ref 10–15)
GFR SERPL CREATININE-BSD FRML MDRD: >90 ML/MIN/1.73M2
GLUCOSE BLD-MCNC: 98 MG/DL (ref 70–125)
GLUCOSE UR STRIP-MCNC: NEGATIVE MG/DL
HCG UR QL: NEGATIVE
HCT VFR BLD AUTO: 39.3 % (ref 35–47)
HGB BLD-MCNC: 13.4 G/DL (ref 11.7–15.7)
HGB UR QL STRIP: NEGATIVE
KETONES UR STRIP-MCNC: NEGATIVE MG/DL
LEUKOCYTE ESTERASE UR QL STRIP: ABNORMAL
MCH RBC QN AUTO: 30.6 PG (ref 26.5–33)
MCHC RBC AUTO-ENTMCNC: 34.1 G/DL (ref 31.5–36.5)
MCV RBC AUTO: 90 FL (ref 78–100)
NITRATE UR QL: NEGATIVE
PH UR STRIP: 6 [PH] (ref 5–8)
PLATELET # BLD AUTO: 202 10E3/UL (ref 150–450)
POTASSIUM BLD-SCNC: 3.8 MMOL/L (ref 3.5–5)
PROT SERPL-MCNC: 7.1 G/DL (ref 6–8)
RBC # BLD AUTO: 4.38 10E6/UL (ref 3.8–5.2)
RBC #/AREA URNS AUTO: ABNORMAL /HPF
SODIUM SERPL-SCNC: 143 MMOL/L (ref 136–145)
SP GR UR STRIP: 1.02 (ref 1–1.03)
SQUAMOUS #/AREA URNS AUTO: ABNORMAL /LPF
UROBILINOGEN UR STRIP-ACNC: 0.2 E.U./DL
WBC # BLD AUTO: 5.7 10E3/UL (ref 4–11)
WBC #/AREA URNS AUTO: ABNORMAL /HPF

## 2022-03-23 PROCEDURE — 99213 OFFICE O/P EST LOW 20 MIN: CPT | Mod: GC

## 2022-03-23 PROCEDURE — 81025 URINE PREGNANCY TEST: CPT

## 2022-03-23 PROCEDURE — 80053 COMPREHEN METABOLIC PANEL: CPT

## 2022-03-23 PROCEDURE — 81001 URINALYSIS AUTO W/SCOPE: CPT

## 2022-03-23 PROCEDURE — 85027 COMPLETE CBC AUTOMATED: CPT

## 2022-03-23 PROCEDURE — 36415 COLL VENOUS BLD VENIPUNCTURE: CPT

## 2022-03-23 NOTE — PROGRESS NOTES
Assessment & Plan     Gross hematuria  Patient has had 1 week of red urine. Only associated symptom of urinary frequency. No dysuria or pelvic pain. 1 prior episode. No meds that can cause urine color change (pyridium, rifampin), no foods that can cause urine color change (beets, rhubarb).   - UA reflex to Microscopic   -orange urine, LE, no blood   -no RBCs or WBCs, but trace bacteria   -see if symptom resolution in 1 week, if not, follow up    -consider US kidney, bladder and possible urology referral   -drink lots of water to flush out urine  - Urine Microscopic Exam  - CBC with platelets  - Comprehensive metabolic panel    Amenorrhea  Patient is beginning to have spaced out periods. LMP January 7th, previously had consistently 1/month, starting to be once every 2 months. May be young but could be early perimenopause  - HCG qualitative urine. negative    Ordering of each unique test  60 minutes spent on patient care, lab interpretation and charting         Return if symptoms worsen or fail to improve.    Janae Ovalle MD  [facility]    Kailash Zabala is a 41 year old who presents for the following health issues  HPI     Sagrario has been having red-colored urine for the past week. She reports no abdominal or pubic pain, no dysuria, only urinary frequency. She does not report taking any new medicines (including pyridium or rifampin) or any new foods that can change urine color (beets, rhubarb, blackberries). No rib pain, but she does have lower back pain after straining her back ~3 weeks ago. No recent trauma to abdomen or groin, and no pain with sex. She is not currently menstruating.     LMP was January 7th. She used to have periods monthly, but starting in September, they've been every 2 months. She also mentioned she has been having episodes of feeling very hot all of a sudden, but does not feel achy like a fever. No mood disturbances reported.       Review of Systems  Patient  reports urinary frequency, hot flashes, red urine, and lower back pain.     Patient denies fever, chills, myalgias, cough, sore throat, chest pain, shortness of breath, palpitations, upper back pain, dysuria, dysparunia, abdominal pain, nausea, vomiting, bowel changes, or balance issues.         Objective    /75 (BP Location: Left arm, Patient Position: Sitting, Cuff Size: Adult Regular)   Pulse 73   Temp 97.9  F (36.6  C) (Oral)   Resp 16   LMP 01/12/2022 (Exact Date)   SpO2 100%   Breastfeeding No   There is no height or weight on file to calculate BMI.  Physical Exam   GENERAL: healthy, alert and no distress  EYES: R sclera slightly injected  NECK: no adenopathy, no asymmetry, masses, or scars and thyroid normal to palpation  RESP: lungs clear to auscultation - no rales, rhonchi or wheezes  CV: regular rate and rhythm, normal S1 S2, no S3 or S4, no murmur, click or rub, no peripheral edema and peripheral pulses strong  ABDOMEN: soft, nontender, no hepatosplenomegaly, no masses and bowel sounds normal  MS: no gross musculoskeletal defects noted, no edema  SKIN: no suspicious lesions or rashes  BACK: no CVA tenderness, mild paraspinal tension around L5-S1  PSYCH: mentation appears normal, affect normal/bright  LYMPH: no cervical, supraclavicular, axillary, or inguinal adenopathy    Results for orders placed or performed in visit on 03/23/22 (from the past 24 hour(s))   HCG qualitative urine   Result Value Ref Range    hCG Urine Qualitative Negative Negative   UA reflex to Microscopic   Result Value Ref Range    Color Urine Orange (A) Colorless, Straw, Light Yellow, Yellow    Appearance Urine Clear Clear    Glucose Urine Negative Negative mg/dL    Bilirubin Urine Negative Negative    Ketones Urine Negative Negative mg/dL    Specific Gravity Urine 1.025 1.005 - 1.030    Blood Urine Negative Negative    pH Urine 6.0 5.0 - 8.0    Protein Albumin Urine Negative Negative mg/dL    Urobilinogen Urine 0.2 0.2,  1.0 E.U./dL    Nitrite Urine Negative Negative    Leukocyte Esterase Urine Trace (A) Negative   Urine Microscopic Exam   Result Value Ref Range    Bacteria Urine Few (A) None Seen /HPF    RBC Urine None Seen 0-2 /HPF /HPF    WBC Urine 0-5 0-5 /HPF /HPF    Squamous Epithelials Urine Few (A) None Seen /LPF   CBC with platelets   Result Value Ref Range    WBC Count 5.7 4.0 - 11.0 10e3/uL    RBC Count 4.38 3.80 - 5.20 10e6/uL    Hemoglobin 13.4 11.7 - 15.7 g/dL    Hematocrit 39.3 35.0 - 47.0 %    MCV 90 78 - 100 fL    MCH 30.6 26.5 - 33.0 pg    MCHC 34.1 31.5 - 36.5 g/dL    RDW 11.6 10.0 - 15.0 %    Platelet Count 202 150 - 450 10e3/uL       ----- Service Performed and Documented by Resident or Fellow ------

## 2022-03-23 NOTE — PROGRESS NOTES
Preceptor Attestation:    I discussed the patient with the resident and evaluated the patient in person. I have verified the content of the note, which accurately reflects my assessment of the patient and the plan of care. She noted eating blackberries recently within 2 weeks.   Supervising Physician:  Alton Goyal MD.    Results for orders placed or performed in visit on 03/23/22   UA reflex to Microscopic     Status: Abnormal   Result Value Ref Range    Color Urine Orange (A) Colorless, Straw, Light Yellow, Yellow    Appearance Urine Clear Clear    Glucose Urine Negative Negative mg/dL    Bilirubin Urine Negative Negative    Ketones Urine Negative Negative mg/dL    Specific Gravity Urine 1.025 1.005 - 1.030    Blood Urine Negative Negative    pH Urine 6.0 5.0 - 8.0    Protein Albumin Urine Negative Negative mg/dL    Urobilinogen Urine 0.2 0.2, 1.0 E.U./dL    Nitrite Urine Negative Negative    Leukocyte Esterase Urine Trace (A) Negative   HCG qualitative urine     Status: Normal   Result Value Ref Range    hCG Urine Qualitative Negative Negative   Urine Microscopic Exam     Status: Abnormal   Result Value Ref Range    Bacteria Urine Few (A) None Seen /HPF    RBC Urine None Seen 0-2 /HPF /HPF    WBC Urine 0-5 0-5 /HPF /HPF    Squamous Epithelials Urine Few (A) None Seen /LPF   CBC with platelets     Status: Normal   Result Value Ref Range    WBC Count 5.7 4.0 - 11.0 10e3/uL    RBC Count 4.38 3.80 - 5.20 10e6/uL    Hemoglobin 13.4 11.7 - 15.7 g/dL    Hematocrit 39.3 35.0 - 47.0 %    MCV 90 78 - 100 fL    MCH 30.6 26.5 - 33.0 pg    MCHC 34.1 31.5 - 36.5 g/dL    RDW 11.6 10.0 - 15.0 %    Platelet Count 202 150 - 450 10e3/uL   Comprehensive metabolic panel     Status: Abnormal   Result Value Ref Range    Sodium 143 136 - 145 mmol/L    Potassium 3.8 3.5 - 5.0 mmol/L    Chloride 105 98 - 107 mmol/L    Carbon Dioxide (CO2) 29 22 - 31 mmol/L    Anion Gap 9 5 - 18 mmol/L    Urea Nitrogen 5 (L) 8 - 22 mg/dL     Creatinine 0.62 0.60 - 1.10 mg/dL    Calcium 9.7 8.5 - 10.5 mg/dL    Glucose 98 70 - 125 mg/dL    Alkaline Phosphatase 61 45 - 120 U/L    AST 16 0 - 40 U/L    ALT <9 0 - 45 U/L    Protein Total 7.1 6.0 - 8.0 g/dL    Albumin 4.0 3.5 - 5.0 g/dL    Bilirubin Total 0.6 0.0 - 1.0 mg/dL    GFR Estimate >90 >60 mL/min/1.73m2    Above results are reassuring.  Alton Goyal MD

## 2022-04-27 ENCOUNTER — LAB REQUISITION (OUTPATIENT)
Dept: LAB | Facility: HOSPITAL | Age: 42
End: 2022-04-27

## 2022-04-27 LAB — SARS-COV-2 RNA RESP QL NAA+PROBE: NEGATIVE

## 2022-04-27 PROCEDURE — U0003 INFECTIOUS AGENT DETECTION BY NUCLEIC ACID (DNA OR RNA); SEVERE ACUTE RESPIRATORY SYNDROME CORONAVIRUS 2 (SARS-COV-2) (CORONAVIRUS DISEASE [COVID-19]), AMPLIFIED PROBE TECHNIQUE, MAKING USE OF HIGH THROUGHPUT TECHNOLOGIES AS DESCRIBED BY CMS-2020-01-R: HCPCS | Performed by: INTERNAL MEDICINE

## 2022-07-23 ENCOUNTER — LAB REQUISITION (OUTPATIENT)
Dept: LAB | Facility: HOSPITAL | Age: 42
End: 2022-07-23

## 2022-07-23 LAB — SARS-COV-2 RNA RESP QL NAA+PROBE: NEGATIVE

## 2022-07-23 PROCEDURE — U0003 INFECTIOUS AGENT DETECTION BY NUCLEIC ACID (DNA OR RNA); SEVERE ACUTE RESPIRATORY SYNDROME CORONAVIRUS 2 (SARS-COV-2) (CORONAVIRUS DISEASE [COVID-19]), AMPLIFIED PROBE TECHNIQUE, MAKING USE OF HIGH THROUGHPUT TECHNOLOGIES AS DESCRIBED BY CMS-2020-01-R: HCPCS | Performed by: INTERNAL MEDICINE

## 2022-09-18 ENCOUNTER — HEALTH MAINTENANCE LETTER (OUTPATIENT)
Age: 42
End: 2022-09-18

## 2022-10-05 NOTE — LETTER
May 31, 2019      Sagrario Meyer  [address]   [place] [address]        Dear Sagrario,  Negative HPV and pap smear.  Repeat in 5 years.      Please see below for your test results.    Resulted Orders   ABO/Rh Type-HML ([facility])   Result Value Ref Range    ABO/Rh(D) O POS     Repeat ABO/Rh Typing ([facility]) O POS     Narrative    Test performed by:   [facility]  [address], [place] [address]   Antibody Screen ([facility])   Result Value Ref Range    Antibody Screen Negative Negative    Narrative    Test performed by:   [facility]  [address], [place] [address]   Chlamydia/Gono Amplified ([facility])   Result Value Ref Range    Chlamydia trac,Amplified Prb Negative Negative    N gonorrhoeae,Amplified Prb Negative Negative    Narrative    Test performed by:  [facility] [address] [place] [address]   CBC with Plt ([facility])   Result Value Ref Range    WBC 8.8 4.0 - 11.0 K/uL    RBC 4.5 3.8 - 5.2 M/uL    Hemoglobin 13.7 11.7 - 15.7 g/dL    Hematocrit 42.8 35.0 - 47.0 %    MCV 96.2 78.0 - 100.0 fL    MCH 30.8 26.5 - 35.0    MCHC 32.0 32.0 - 36.0 g/dL    Platelets 251.0 150.0 - 450.0 K/uL   Culture Urine ([facility])   Result Value Ref Range    Culture SEE RESULTS BELOW       Comment:      CULTURE, URINE   SOURCE: Urine, Clean Catch   CULTURE RESULTS:    No Growth      Narrative    Test performed by:  [facility] [address] [place] [address]   Hepatitis B Surface Ag ([facility])   Result Value Ref Range    Hepatitis B Surface Antigen Negative Negative    Narrative    Test performed by:  [facility] [address] [place] [address]   HIV Ag/Ab Screen [place] ([facility])   Result Value Ref Range    HIV Antigen/Antibody Negative Negative    Narrative    Test performed by:  [facility] [address] [place] [address]  Method is Abbott HIV Ag/Ab for the detection of HIV p24 antigen, HIV-1   antibodies and HIV-2 antibodies.    Lead, Blood ([facility])   Result Value Ref Range    Lead See Note. <5.0 ug/dL      Comment:      Reflex testing sent to [facility] Result to be reported on the   separate reflexed test code.      Collection Method Venous     Lead Retest No     Narrative    Test performed by:  [facility] [address] [place] [address]   Rubella  IgG ([facility])   Result Value Ref Range    Rubella IgG Positive     Narrative    Test performed by:  [facility] [address] [place] [address]  Negative: Absence of detectable rubella virus IgG antibodies. A negative   result presumes that immunity has not been acquired.   Equivocal: Suggest recollection.  Positive: Considered positive for IgG antibodies to rubella virus.   Syphilis Screen [place] ([facility])   Result Value Ref Range    Treponema Antibody (Syphilis) Negative Negative    Narrative    Test performed by:  [facility] [address] [place] [address]   Urinalysis([facility])   Result Value Ref Range    Specific Gravity Urine 1.015 1.005 - 1.030    pH Urine >=9.0 4.5 - 8.0    Leukocyte Esterase UR Negative -ATIVE    Nitrite Urine Negative -ATIVE    Protein UR Negative -ATIVE    Glucose Urine Negative -ATIVE    Ketones Urine Negative -ATIVE    Urobilinogen mg/dL 0.2 E.U./dL 0.2 E.U./dL    Bilirubin UR Negative -ATIVE    Blood UR Negative -ATIVE   GYN Cytology ([facility])   Result Value Ref Range    Lab AP Case Report SEE RESULTS BELOW       Comment:      Gynecologic Cytology Report                       Case: [id number]                                     Authorizing Provider:  Dee Webb MD      Collected:             05/23/2019 1546              Ordering Location:      [facility] Received:              05/24/2019 0818              First Screen:          Cyndee Aguilar CT                                                                              (ASCP)                               "                                           Specimen:    SUREPATH PAP, SCREENING, Endocervical/cervical                                               Lab AP Gyn Interpretation SEE RESULTS BELOW Negative for squamous intraepithelial le      Comment:      Negative for squamous intraepithelial lesion or malignancy  Electronically signed by Cyndee Aguilar CT (ASCP) on 5/30/2019 at  3:15   PM      Lab AP Malignant? Normal Normal    Specimen adequacy:       Satisfactory for evaluation, endocervical/transformation zone component present    HPV Reflex? Yes regardless of result     High Risk? No     Last Mens Period 3/25/19     Lab AP Abnormal Bleeding No     Lab AP Patient Status pregnant     Lab AP Birth Control/Hormones None     Lab AP Previous Normal none     Lab AP Previous Abnl none     Lab AP Cervical Appearance normal     Narrative    Test performed by:  [facility]  [address] [place] [address]   Hepatitis B Surface Ab ([facility])   Result Value Ref Range    Hepatitis B Surface Antibody Positive (A) Negative    Narrative    Test performed by:  [facility] [address] [place] [address]   Lead With Demographics ([facility] [employer] [facility])   Result Value Ref Range    Lead, Blood (Venous) <2.0 0.0 - 4.9 ug/dL      Comment:      INTERPRETIVE INFORMATION: Lead, Blood (Venous)     Elevated results may be due to skin or collection-related   contamination, including the use of a noncertified lead-free tube.   If contamination concerns exist due to elevated levels of blood   lead, confirmation with a second specimen collected in a certified   lead-free tube is recommended.     Information sources for reference intervals and interpretive   comments include the \"CDC Response to the 2012 Advisory Committee   on Childhood Lead Poisoning Prevention Report\" and the   \"Recommendations for Medical Management of Adult Lead Exposure,   Environmental Health Perspectives, 2007.\" Thresholds and time "   intervals for retesting, medical evaluation, and response vary by   state and regulatory body. Contact your State Department of Health   and/or applicable regulatory agency for specific guidance on   medical management recommendations.            Age            Concentration   Comment     All ages       5-9.9 ug/dL     Adverse hea  lth effects are                                  possible, particularly in                                 children under 6 years of                                 age and pregnant women.                                 Discuss health risks                                 associated with continued                                 lead exposure. For children                                 and women who are or may                                 become pregnant, reduce                                 lead exposure.                  All ages        10-19.9 ug/dL  Reduced lead exposure and                                 increased biological                                 monitoring are recommended.     All ages        20-69.9 ug/dL  Removal from lead exposure                                 and prompt medical                                 evaluation are recommended.                                 Consider chelation therapy                                 when concentrations exceed                                   50 ug/dL and symptoms of                                 lead toxicity are present.     Less than 19     Greater than  Critical. Immediate medical  years of age     44.9 ug/dL    evaluation is recommended.                                 Consider chelation therapy                                  when symptoms of lead                                 toxicity are present.     Greater than 19  Greater than  Critical. Immediate medical  years of age     69.9 ug/dL    evaluation is recommended                                 Consider chelation therapy                                  when symptoms of lead                                  toxicity are present.     Test developed and characteristics determined by [facility]. See Compliance Statement B: [facility]  Performed by [facility],  [address] [place] [address] [phone]  www.[email or web address] [facility], Ho Mesa MD, Lab. Director      Narrative    Test performed by:  [facility]  [address] [place] [address]   HPV [place] PCR ([facility])   Result Value Ref Range    HPV Source [facility]     HPV 16 DNA Negative NEG    HPV 18 DNA Negative NEG    Other HR HPV Negative NEG    Final Diagnosis SEE NOTES       Comment:      This patient's sample is negative for HPV DNA.  This test was developed and its performance characteristics determined by the  [facility], [facility]. It  has not been cleared or approved by the FDA. The laboratory is regulated under  CLIA as qualified to perform high-complexity testing. This test is used for  clinical purposes. It should not be regarded as investigational or for  research.  (Note)  METHODOLOGY:  The Roche siat 4800 system uses automated extraction,  simultaneous amplification of HPV (L1 region) and beta-globin,  followed by  real time detection of fluorescent labeled HPV and beta  globin using specific oligonucleotide probes . The test specifically  identifies types HPV 16 DNA and HPV 18 DNA while concurrently  detecting the rest of the high risk types (31, 33, 35, 39, 45, 51,  52, 56, 58, 59, 66 or 68).     COMMENTS:  This test is not intended for use as a screening device  for women under age 30 with normal cervical   cytology.  Results should  be correlated with cytologic and histologic findings. Close clinical  followup is recommended.         Specimen Description Cervical Cells       Comment:      Performed and/or entered by:  [address] [address]        Narrative    Test performed by:  [facility]  [address], [place] [address]       If you have any questions, please call the clinic to make an appointment.    Sincerely,    Dee Webb MD   No

## 2022-10-22 DIAGNOSIS — Z00.00 ENCOUNTER FOR PREVENTIVE CARE: ICD-10-CM

## 2022-10-22 DIAGNOSIS — E55.9 VITAMIN D DEFICIENCY: ICD-10-CM

## 2022-10-25 RX ORDER — CHOLECALCIFEROL (VITAMIN D3) 50 MCG
TABLET ORAL
Qty: 30 TABLET | Refills: 14 | Status: SHIPPED | OUTPATIENT
Start: 2022-10-25 | End: 2022-11-17

## 2022-10-25 RX ORDER — PRENATAL VIT/IRON FUM/FOLIC AC 27MG-0.8MG
TABLET ORAL
Qty: 30 TABLET | Refills: 12 | Status: SHIPPED | OUTPATIENT
Start: 2022-10-25 | End: 2022-11-17

## 2022-11-17 DIAGNOSIS — Z00.00 ENCOUNTER FOR PREVENTIVE CARE: ICD-10-CM

## 2022-11-17 DIAGNOSIS — E55.9 VITAMIN D DEFICIENCY: ICD-10-CM

## 2022-11-17 RX ORDER — CHOLECALCIFEROL (VITAMIN D3) 50 MCG
TABLET ORAL
Qty: 30 TABLET | Refills: 14 | Status: SHIPPED | OUTPATIENT
Start: 2022-11-17 | End: 2024-03-08

## 2022-11-17 RX ORDER — PNV NO.95/FERROUS FUM/FOLIC AC 28MG-0.8MG
TABLET ORAL
Qty: 30 TABLET | Refills: 12 | Status: SHIPPED | OUTPATIENT
Start: 2022-11-17 | End: 2023-02-01

## 2022-12-09 ENCOUNTER — OFFICE VISIT (OUTPATIENT)
Dept: FAMILY MEDICINE | Facility: CLINIC | Age: 42
End: 2022-12-09
Payer: COMMERCIAL

## 2022-12-09 VITALS
BODY MASS INDEX: 24.61 KG/M2 | WEIGHT: 143.4 LBS | HEART RATE: 72 BPM | SYSTOLIC BLOOD PRESSURE: 112 MMHG | TEMPERATURE: 97.8 F | OXYGEN SATURATION: 100 % | DIASTOLIC BLOOD PRESSURE: 72 MMHG | RESPIRATION RATE: 12 BRPM

## 2022-12-09 DIAGNOSIS — N92.6 LATE PERIOD: ICD-10-CM

## 2022-12-09 DIAGNOSIS — R11.0 NAUSEA: ICD-10-CM

## 2022-12-09 DIAGNOSIS — M79.662 PAIN IN BOTH LOWER LEGS: Primary | ICD-10-CM

## 2022-12-09 DIAGNOSIS — R12 HEARTBURN: ICD-10-CM

## 2022-12-09 DIAGNOSIS — M79.661 PAIN IN BOTH LOWER LEGS: Primary | ICD-10-CM

## 2022-12-09 PROCEDURE — 99214 OFFICE O/P EST MOD 30 MIN: CPT | Mod: GC

## 2022-12-09 RX ORDER — FAMOTIDINE 40 MG/1
40 TABLET, FILM COATED ORAL 2 TIMES DAILY
Qty: 180 TABLET | Refills: 3 | Status: SHIPPED | OUTPATIENT
Start: 2022-12-09 | End: 2023-04-16

## 2022-12-09 RX ORDER — ACETAMINOPHEN 325 MG/1
325-650 TABLET ORAL EVERY 6 HOURS PRN
Qty: 60 TABLET | Refills: 1 | Status: SHIPPED | OUTPATIENT
Start: 2022-12-09 | End: 2024-03-08

## 2022-12-09 NOTE — PROGRESS NOTES
Preceptor Attestation:  I discussed the patient with the resident and evaluated the patient in person. I have verified the content of the note, which accurately reflects my assessment of the patient and the plan of care.  Supervising Physician:  Alycia Mars MD.

## 2022-12-09 NOTE — PROGRESS NOTES
Assessment & Plan     Heartburn  Nausea  Has been experiencing recurrent episodes of heartburn for about 1 month.  She has been having some episodes of vomiting associated with it, foods that worsen include tea and bread, foods that soothe include yogurt, flaxseed, and papaya.  Patient reports medicine she took before that helped with heartburn symptoms, cannot recall what it was.  No epigastric tenderness on exam.  However, patient can symptoms extremely consistent with gastroesophageal reflux.  - famotidine (PEPCID) 40 MG tablet; Take 1 tablet (40 mg) by mouth 2 times daily Or up to 2 times daily as needed for heartburn  -Follow-up in 1 to 2 weeks.  If symptoms improved on famotidine, continue for 1 month, then take as needed.  Not improved, start PPI.      Pain in both lower legs  Patient has been having intermittent episodes of burning sensation in feet and medial calves bilaterally.  Seems to be associated with long hours standing on feet, no pain when for standing in the morning.  Reports associated sensation of legs feeling weak.  Numbness, tingling, saddle anesthesia, low back pain, or sciatica.  Patient is not aware of any history of diabetes, nerve pain, injuries to the leg in the past.  Exam benign, no signs of swelling or DVT.  Leg strength intact.  Diagnosis unclear at this point, differential includes restless leg syndrome, magnesium deficiency, muscle fatigue from prolonged standing, complex regional pain syndrome, neuropathy, or DVT.  Lab is closed at this point in the day, so labs will need to be done at follow-up visit in 1 to 2 weeks.  - acetaminophen (TYLENOL) 325 MG tablet; Take 1-2 tablets (325-650 mg) by mouth every 6 hours as needed for mild pain or fever  - CBC with platelets; Future  - Ferritin; Future  - Magnesium; Future  -A1C; future    Late period  Reports feeling symptoms for the past few days similar to menstrual symptoms, including cramping, breast swelling, mood changes.  However,  it has been about 5 days since the symptoms began and she has not noticed any bleeding.  -At follow-up visit, we will see if she has had her period, if not consider urine pregnancy test.    Diagnosis or treatment significantly limited by social determinants of health - limited health literacy, English as second language, ethnic minority  Ordering of each unique test  Prescription drug management  50 minutes spent on the date of the encounter doing chart review, history and exam, documentation and further activities per the note       MEDICATIONS:   Orders Placed This Encounter   Medications     famotidine (PEPCID) 40 MG tablet     Sig: Take 1 tablet (40 mg) by mouth 2 times daily Or up to 2 times daily as needed for heartburn     Dispense:  180 tablet     Refill:  3     acetaminophen (TYLENOL) 325 MG tablet     Sig: Take 1-2 tablets (325-650 mg) by mouth every 6 hours as needed for mild pain or fever     Dispense:  60 tablet     Refill:  1          - Continue other medications without change  FUTURE LABS:       -CBC, ferritin, magnesium, A1c at next visit in 1 week to 2 weeks  FUTURE APPOINTMENTS:       - Follow-up visit in 1 to 2 weeks to assess efficacy of heartburn treatment and check labs for diagnosis of leg pain.    Return in about 1 week (around 12/16/2022) for Follow up.    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 /Feeling pain in leg that is part of the heart pain, makes pt tired. )      HPI   Intermittent heartburn for about 1 month, some associated vomiting, usually just reflux and acidic sensation in throat. Worse with tea and bread. Improved with yogurt, flax seed or papaya. Worse with stress, notes was worst last week, getting a little better today. No associated diarrhea, shoulder pain, back pain, bloody or  "tarry stools. Patient has had intermittent episodes of heartburn in the past, this is the most sustained period of it occurring.    Patient reports insidious onset of unclear timeline burning sensation in foot, intermittent, worse with activity. Pain starts in heel and arch of foot when on feet a lot (works 8hrs/day on feet). Then, started to get bilateral, medial calf burning sensation. No associated numbness or tingling, just \"off feeling I can't put into words\". No swelling of lower legs, no shortness of breath. Does report associated fatigue and weakness of legs.      Finally, patient reports she is felt symptoms similar to her premenstrual symptoms and cramping, breast tenderness, mood changes.  She is due for her period and started wearing a pad to prepare.  However, it has been about 5 days since the symptoms began and she has not had her period yet.     Review of Systems   Constitutional, HEENT, cardiovascular, pulmonary, gi and gu systems are negative, except as otherwise noted.    Substernal burning sensation, vomiting, acidic feeling in throat, bilateral foot burning sensation, calf discomfort bilateral, lower muscle weakness, fatigue, abdominal cramping, breast tenderness    Patient denies difficulty breathing, shortness of breath, upper chest pain, pressure-like chest pain, other myalgias, vaginal leakage, dysuria, bleeding.      Objective    /72   Pulse 72   Temp 97.8  F (36.6  C) (Oral)   Resp 12   Wt 65 kg (143 lb 6.4 oz)   LMP  (LMP Unknown)   SpO2 100%   BMI 24.61 kg/m    Body mass index is 24.61 kg/m .     Physical Exam   GENERAL: healthy, alert and no distress  RESP: lungs clear to auscultation - no rales, rhonchi or wheezes  CV: regular rate and rhythm, normal S1 S2, no S3 or S4, no murmur, click or rub, no peripheral edema and peripheral pulses strong  ABDOMEN: soft, nontender, no hepatosplenomegaly, no masses and bowel sounds normal, no epigastric or chest wall tenderness to " palpation  MS: bilateral ankles 5/5 strength, bilateral knees 5/5 strength, no redness, swelling or tenderness with palpation of calves, no loss of sensation in areas of discomfort,   SKIN: no suspicious lesions or rashes, R foot showed onychomycosis   NEURO: Normal strength and tone, mentation intact and speech normal, +2 bilateral patellar reflexes, no loss of sensation in feet or lower legs  PSYCH: Mildly anxious affect, overall normal mood    No tests at this visit as the lab had closed.    ----- Service Performed and Documented by Resident or Fellow ------

## 2022-12-09 NOTE — PATIENT INSTRUCTIONS
Take famotidine 2 times a day by mouth. We will follow up in 1-2 weeks to make sure this works. If not, we will start a stronger medicine.     Take Tylenol 1-2 pills by mouth for leg pain. We will do some blood tests at your follow up visit to look into the cause.     Start wearing shoes with arch support or buy arch support inserts to wear in flat-footed shoes. Take breaks and rest your feet when you are standing for prolonged periods.

## 2023-01-20 ENCOUNTER — LAB (OUTPATIENT)
Dept: LAB | Facility: CLINIC | Age: 43
End: 2023-01-20
Payer: COMMERCIAL

## 2023-01-20 DIAGNOSIS — Z20.822 SUSPECTED COVID-19 VIRUS INFECTION: ICD-10-CM

## 2023-01-20 PROCEDURE — U0005 INFEC AGEN DETEC AMPLI PROBE: HCPCS

## 2023-01-20 PROCEDURE — U0003 INFECTIOUS AGENT DETECTION BY NUCLEIC ACID (DNA OR RNA); SEVERE ACUTE RESPIRATORY SYNDROME CORONAVIRUS 2 (SARS-COV-2) (CORONAVIRUS DISEASE [COVID-19]), AMPLIFIED PROBE TECHNIQUE, MAKING USE OF HIGH THROUGHPUT TECHNOLOGIES AS DESCRIBED BY CMS-2020-01-R: HCPCS

## 2023-01-21 LAB — SARS-COV-2 RNA RESP QL NAA+PROBE: NEGATIVE

## 2023-01-23 ENCOUNTER — TELEPHONE (OUTPATIENT)
Dept: FAMILY MEDICINE | Facility: CLINIC | Age: 43
End: 2023-01-23
Payer: COMMERCIAL

## 2023-01-23 NOTE — TELEPHONE ENCOUNTER
1/20/23 negative covid results given to pt  Copy placed at  for her to     Ernestina Whitfield RN on 1/23/2023 at 10:56 AM

## 2023-02-01 ENCOUNTER — OFFICE VISIT (OUTPATIENT)
Dept: FAMILY MEDICINE | Facility: CLINIC | Age: 43
End: 2023-02-01
Payer: COMMERCIAL

## 2023-02-01 VITALS
DIASTOLIC BLOOD PRESSURE: 73 MMHG | BODY MASS INDEX: 24.13 KG/M2 | TEMPERATURE: 98.5 F | SYSTOLIC BLOOD PRESSURE: 106 MMHG | HEART RATE: 77 BPM | OXYGEN SATURATION: 98 % | RESPIRATION RATE: 20 BRPM | WEIGHT: 140.6 LBS

## 2023-02-01 DIAGNOSIS — R05.1 ACUTE COUGH: Primary | ICD-10-CM

## 2023-02-01 PROCEDURE — U0005 INFEC AGEN DETEC AMPLI PROBE: HCPCS

## 2023-02-01 PROCEDURE — 99213 OFFICE O/P EST LOW 20 MIN: CPT | Mod: CS

## 2023-02-01 PROCEDURE — U0003 INFECTIOUS AGENT DETECTION BY NUCLEIC ACID (DNA OR RNA); SEVERE ACUTE RESPIRATORY SYNDROME CORONAVIRUS 2 (SARS-COV-2) (CORONAVIRUS DISEASE [COVID-19]), AMPLIFIED PROBE TECHNIQUE, MAKING USE OF HIGH THROUGHPUT TECHNOLOGIES AS DESCRIBED BY CMS-2020-01-R: HCPCS

## 2023-02-01 NOTE — PATIENT INSTRUCTIONS
Thank you for taking the time to discuss your health with me today!    Today we discussed:  You and Lorrie likely are passing common viral illnesses back and forth to each other. Your cough is likely post viral cough and can last for 4-6 weeks. Try tea, honey as needed to help with your cough or over the counter cough medicine that is safe in blood pressure.   2.  Your COVID test will return tomorrow.     As always, please call the clinic or message with any questions or concerns.     Best Wishes,  Alycia Kwan MD.

## 2023-02-01 NOTE — PROGRESS NOTES
Preceptor Attestation:    I discussed the patient with the resident and evaluated the patient in person. I have verified the content of the note, which accurately reflects my assessment of the patient and the plan of care.   Supervising Physician:  Didier Tracy DO.

## 2023-02-01 NOTE — PROGRESS NOTES
Assessment & Plan:    1. Post Viral Cough  Likely postviral cough.  She is afebrile and well-appearing on exam, VSS, lung sounds are clear so low concern for pneumonia.  She was treated for latent TB so low concern for active TB.  -Symptom management  -COVID test per patient request  -Follow-up in 4 to 6 weeks if not improving    Subjective   Sagrario is a 42 year old, with history of latent tuberculosis treated in 2017, presenting for concern for cough for 3 weeks.  She reports that she and her 3-year-old son have both had a similar cough, nasal congestion.  She initially got sick and then got better her son got sick and then got better.  Her cough has continued.  She notes it is worse at night and sounds harsh.  Her cough is not productive.  She denies fever, but endorses some sweating.  She denies chest pain, shortness of breath, palpitations, abdominal pain or GI symptoms.  She denies changes in her weight.  She did a home COVID test that was negative.  Cough (Cough for 3 wks )        Review of Systems   Constitutional, HEENT, cardiovascular, pulmonary, gi and gu systems are negative, except as otherwise noted.      Objective    /73   Pulse 77   Temp 98.5  F (36.9  C) (Oral)   Resp 20   Wt 63.8 kg (140 lb 9.6 oz)   LMP 12/10/2022   SpO2 99%   BMI 24.13 kg/m    Body mass index is 24.13 kg/m .  Physical Exam   GENERAL: healthy, alert and no distress  NECK: no adenopathy, no asymmetry, masses, or scars and thyroid normal to palpation  RESP: lungs clear to auscultation - no rales, rhonchi or wheezes  CV: regular rate and rhythm, normal S1 S2  ABDOMEN: soft, nontender, no hepatosplenomegaly, no masses and bowel sounds normal  MS: no gross musculoskeletal defects noted, no edema    I precepted today with Dr Tracy.    Alycia Kwan MD PGY-2

## 2023-02-02 LAB — SARS-COV-2 RNA RESP QL NAA+PROBE: NEGATIVE

## 2023-04-07 ENCOUNTER — OFFICE VISIT (OUTPATIENT)
Dept: FAMILY MEDICINE | Facility: CLINIC | Age: 43
End: 2023-04-07
Payer: COMMERCIAL

## 2023-04-07 VITALS
HEART RATE: 76 BPM | RESPIRATION RATE: 24 BRPM | SYSTOLIC BLOOD PRESSURE: 111 MMHG | BODY MASS INDEX: 24.37 KG/M2 | DIASTOLIC BLOOD PRESSURE: 72 MMHG | TEMPERATURE: 98 F | OXYGEN SATURATION: 99 % | WEIGHT: 142 LBS

## 2023-04-07 DIAGNOSIS — R10.2 PELVIC PAIN IN FEMALE: Primary | ICD-10-CM

## 2023-04-07 DIAGNOSIS — N92.6 LATE PERIOD: Primary | ICD-10-CM

## 2023-04-07 DIAGNOSIS — N92.6 LATE PERIOD: ICD-10-CM

## 2023-04-07 LAB — HCG UR QL: NEGATIVE

## 2023-04-07 PROCEDURE — 99214 OFFICE O/P EST MOD 30 MIN: CPT | Performed by: FAMILY MEDICINE

## 2023-04-07 PROCEDURE — 81025 URINE PREGNANCY TEST: CPT | Performed by: FAMILY MEDICINE

## 2023-04-07 RX ORDER — NAPROXEN 250 MG/1
250 TABLET ORAL 2 TIMES DAILY PRN
Qty: 20 TABLET | Refills: 0 | Status: SHIPPED | OUTPATIENT
Start: 2023-04-07 | End: 2024-03-08

## 2023-04-10 ENCOUNTER — ANCILLARY PROCEDURE (OUTPATIENT)
Dept: ULTRASOUND IMAGING | Facility: CLINIC | Age: 43
End: 2023-04-10
Attending: FAMILY MEDICINE
Payer: COMMERCIAL

## 2023-04-10 DIAGNOSIS — R10.2 PELVIC PAIN IN FEMALE: ICD-10-CM

## 2023-04-10 PROCEDURE — 76830 TRANSVAGINAL US NON-OB: CPT | Mod: TC | Performed by: RADIOLOGY

## 2023-04-10 PROCEDURE — 76856 US EXAM PELVIC COMPLETE: CPT | Mod: TC | Performed by: RADIOLOGY

## 2023-04-17 ENCOUNTER — TELEPHONE (OUTPATIENT)
Dept: FAMILY MEDICINE | Facility: CLINIC | Age: 43
End: 2023-04-17
Payer: COMMERCIAL

## 2023-04-17 NOTE — TELEPHONE ENCOUNTER
[facility] phone call message- patient requesting results:    Test: Lab    Date of test: ?    Additional Comments: Call back with results    OK to leave a message on voice mail? Yes    Primary language: English      needed? No    Call taken on April 17, 2023 at 3:51 PM by Graham Dupree

## 2023-04-17 NOTE — PROGRESS NOTES
I was present with the medical student who participated in the service and in the documentation of this note. I have verified the history and personally performed the physical exam and medical decision making, and have verified the content of the note, which accurately reflects my assessment of the patient and the plan of care.   Alessandra Rust MD

## 2023-04-28 ENCOUNTER — TELEPHONE (OUTPATIENT)
Dept: FAMILY MEDICINE | Facility: CLINIC | Age: 43
End: 2023-04-28
Payer: COMMERCIAL

## 2023-04-28 NOTE — TELEPHONE ENCOUNTER
[facility] phone call message- patient requesting results:    Test: Lab    Date of test: ?    Additional Comments: She would like a call back with her results.    OK to leave a message on voice mail? Yes    Primary language: English      needed? No    Call taken on April 28, 2023 at 10:53 AM by Graham Dupree

## 2023-04-28 NOTE — TELEPHONE ENCOUNTER
Routing to Dr. Valera per pt's request for results of US of pelvic. She said it has been a couple of weeks now and has not heard back.    Notified her will send as high priority since she has been waiting for a while for her results.    Goldie Springer RN on 4/28/2023 at 12:28 PM

## 2023-05-07 ENCOUNTER — HEALTH MAINTENANCE LETTER (OUTPATIENT)
Age: 43
End: 2023-05-07

## 2023-05-16 ENCOUNTER — TELEPHONE (OUTPATIENT)
Dept: FAMILY MEDICINE | Facility: CLINIC | Age: 43
End: 2023-05-16
Payer: COMMERCIAL

## 2023-05-16 NOTE — TELEPHONE ENCOUNTER
[facility] phone call message- patient requesting results:    Test: Lab and Ultrasound    Date of test: 4/10/2023    Additional Comments: the pt called to ask for her results and would like a call back     OK to leave a message on voice mail? Yes       Primary language: English      needed? No    Call taken on May 16, 2023 at 12:28 PM by Damaso Gonzalez

## 2023-05-16 NOTE — TELEPHONE ENCOUNTER
Pt notified of TRAt message below. Asked if she wants a referral to OB/Gyn.    Fibroid is the same as it was back in 2021.  So it's not changed, but still large enough that it could cause pain.  It was 6.8 cm, just a little smaller than a baseball.  If so, then would follow-up with OB/Gyn who could talk about removing the fibroid.     Alessandra Rust MD   Written by Alessandra Rust MD on 5/14/2023 11:09 PM CDT    She is busy now and will call back later to discuss.    Goldie Springer, RN on 5/16/2023 at 2:43 PM

## 2023-05-22 NOTE — TELEPHONE ENCOUNTER
Closing encounter after leaving two VM w/ no return call.    Goldie Springer RN on 5/22/2023 at 11:53 AM

## 2024-01-29 ENCOUNTER — OFFICE VISIT (OUTPATIENT)
Dept: FAMILY MEDICINE | Facility: CLINIC | Age: 44
End: 2024-01-29
Payer: COMMERCIAL

## 2024-01-29 VITALS
HEIGHT: 64 IN | OXYGEN SATURATION: 100 % | SYSTOLIC BLOOD PRESSURE: 126 MMHG | RESPIRATION RATE: 16 BRPM | BODY MASS INDEX: 23.9 KG/M2 | HEART RATE: 77 BPM | WEIGHT: 140 LBS | DIASTOLIC BLOOD PRESSURE: 80 MMHG | TEMPERATURE: 99.2 F

## 2024-01-29 DIAGNOSIS — H10.023 PINK EYE DISEASE OF BOTH EYES: ICD-10-CM

## 2024-01-29 DIAGNOSIS — H66.003 NON-RECURRENT ACUTE SUPPURATIVE OTITIS MEDIA OF BOTH EARS WITHOUT SPONTANEOUS RUPTURE OF TYMPANIC MEMBRANES: Primary | ICD-10-CM

## 2024-01-29 PROCEDURE — 99214 OFFICE O/P EST MOD 30 MIN: CPT | Performed by: FAMILY MEDICINE

## 2024-01-29 RX ORDER — OFLOXACIN 3 MG/ML
1-2 SOLUTION/ DROPS OPHTHALMIC
Qty: 5 ML | Refills: 0 | Status: SHIPPED | OUTPATIENT
Start: 2024-01-29 | End: 2024-03-08

## 2024-01-29 RX ORDER — IBUPROFEN 800 MG/1
800 TABLET, FILM COATED ORAL EVERY 6 HOURS PRN
Qty: 90 TABLET | Refills: 1 | Status: SHIPPED | OUTPATIENT
Start: 2024-01-29 | End: 2024-03-15

## 2024-01-29 RX ORDER — ACETAMINOPHEN 500 MG
500-1000 TABLET ORAL EVERY 6 HOURS PRN
Qty: 100 TABLET | Refills: 1 | Status: SHIPPED | OUTPATIENT
Start: 2024-01-29

## 2024-01-29 NOTE — PROGRESS NOTES
ASSESSMENT/PLAN:  Sagrario was seen today for ear problem, eye problem and medication reconciliation.    Diagnoses and all orders for this visit:    Non-recurrent acute suppurative otitis media of both ears without spontaneous rupture of tympanic membranes  -     amoxicillin-clavulanate (AUGMENTIN) 875-125 MG tablet; Take 1 tablet by mouth 2 times daily for 10 days  -     ibuprofen (ADVIL/MOTRIN) 800 MG tablet; Take 1 tablet (800 mg) by mouth every 6 hours as needed for moderate pain or fever  -     acetaminophen (TYLENOL) 500 MG tablet; Take 1-2 tablets (500-1,000 mg) by mouth every 6 hours as needed for fever or pain    Pink eye disease of both eyes  -     ofloxacin (OCUFLOX) 0.3 % ophthalmic solution; Place 1-2 drops into both eyes every 2 hours (while awake)      This patient most likely has bacterial otitis media and I have treated appropriately.  I have given her a combination of ibuprofen and acetaminophen for pain relief advised her to follow-up if she is still in distress in 1 to 2 days.    Given the likelihood that her otitis is bacterial increases the possibility of a bacterial conjunctivitis and I therefore elected to give her ofloxacin drops to use until her eye symptoms are resolved.    She should feel much better within 48 hours and I have written a letter to excuse her from work for 3 days.    Total visit time with patient was 25 mins, all of which was face to face MD time, and over 50% of this time was spent in counseling and coordination of care.  Including post-encounter documentation and orders on the date of service, total encounter time was 32 mins.    Subjective   Sagrario is a 43 year old, presenting for the following health issues:  Ear Problem (Left side ear pain, pain starting yesterday, no drainage and patient state pain is worsen today compare to yesterday), Eye Problem (Noticing waking up with eye crusted close shut x3 days), and Medication Reconciliation (Med reviewed, patient report not  "taking any med)     This is a 43-year-old who reports quite severe left ear pain x 1 day.  She says that if she coughs or opens her mouth she can get a shooting pain in her left ear.  She does not normally have ear problems.  She reports that she has had some drainage from her eyes for about 5 days which results in her eyes being caked together when she wakes in the morning.  She also has a cough and has had some nasal congestion.    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  Review of Systems   No other concerns today.  Has not been taking any medications.    Objective    Physical Exam   /80 (BP Location: Left arm, Patient Position: Sitting, Cuff Size: Adult Regular)   Pulse 77   Temp 99.2  F (37.3  C) (Oral)   Resp 16   Ht 1.626 m (5' 4\")   Wt 63.5 kg (140 lb)   SpO2 100%   BMI 24.03 kg/m       Vitals stable.  Afebrile.  Well nourished and in no distress  HEENT: She has bulging and reddened tympanic membranes clinically diagnostic of bilateral acute suppurative otitis media without perforation.  In addition she has caked mucus on her eyelids and conjunctival injection bilaterally, left greater than right.  The pupils are equal and reactive to light.  The anterior chambers are both quiet.  Neck supple without lymphadenopathy, no goiter  Heart sounds normal without murmur    Lungs clear to auscultation, no wheezes/rales/rhonchi, good air entry     "

## 2024-01-29 NOTE — LETTER
RETURN TO WORK/SCHOOL FORM    1/29/2024    Re: Sagrario Meyer  1980      To Whom It May Concern:     Sagrario Meyer was seen in clinic today..  She may return to work without restrictions on 2/1/24.         Restrictions:  None      Damaso Miguel MD  1/29/2024 11:33 AM

## 2024-02-01 ENCOUNTER — OFFICE VISIT (OUTPATIENT)
Dept: FAMILY MEDICINE | Facility: CLINIC | Age: 44
End: 2024-02-01
Payer: COMMERCIAL

## 2024-02-01 ENCOUNTER — TELEPHONE (OUTPATIENT)
Dept: FAMILY MEDICINE | Facility: CLINIC | Age: 44
End: 2024-02-01

## 2024-02-01 VITALS
DIASTOLIC BLOOD PRESSURE: 71 MMHG | TEMPERATURE: 97.8 F | OXYGEN SATURATION: 97 % | HEART RATE: 73 BPM | HEIGHT: 64 IN | RESPIRATION RATE: 16 BRPM | WEIGHT: 138.4 LBS | BODY MASS INDEX: 23.63 KG/M2 | SYSTOLIC BLOOD PRESSURE: 115 MMHG

## 2024-02-01 DIAGNOSIS — H65.93 BILATERAL NON-SUPPURATIVE OTITIS MEDIA: Primary | ICD-10-CM

## 2024-02-01 PROCEDURE — 99213 OFFICE O/P EST LOW 20 MIN: CPT | Performed by: FAMILY MEDICINE

## 2024-02-01 ASSESSMENT — PATIENT HEALTH QUESTIONNAIRE - PHQ9: SUM OF ALL RESPONSES TO PHQ QUESTIONS 1-9: 0

## 2024-02-01 NOTE — TELEPHONE ENCOUNTER
"Spoke with pt about symptoms Pt was seen in clinic on 1/29/24 for probable bacterial ear infection. Antibiotics were prescribed. Pt reports taking them as ordered. Pt reports both ears have \"fluid\" coming out of them and they appear \"swollen\". Pt is concerned the symptoms are worsening since her office visit. Pt desires to be seen in clinic today. Scheduled pt with Dr Potter.   Britta, RN, BSN     "

## 2024-02-01 NOTE — PROGRESS NOTES
Patient Active Problem List    Diagnosis Date Noted    Adjustment disorder with mixed anxiety and depressed mood 2021     Priority: Medium    Hazards in home 2021     Priority: Medium    S/P  section 2019     Priority: Medium    Labor and delivery, indication for care 2019     Priority: Medium    Supervision of high risk pregnancy [facility] MD care 12/10/2019     Priority: Medium    Multigravida of advanced maternal age in third trimester 12/10/2019     Priority: Medium    AMA (advanced maternal age) multigravida 35+ 2019     Priority: Medium     AMA- 19 MFM: Cephalic, post placenta, MVP 6.4 cm, EFW 2966 g at 59%, no fetal anomalies detected, BPP , uterine fibroid appears stable in size. Plan: Weekly BPP at [facility] [place]. APPT: 19, 19, and 19.    10/21/19 MFM: BREECH, post placenta, EFW 1695 g at 66%, no fetal anomalies detected, large retroplacental fibroid measuring 83 mm x 74 mm x 72 mm.   PLAN: Repeat growth u/s in 6 wks and will begin weekly BPP then due to AMA 40 yo at [facility] [place]. APPT: 19.    19 MFM: BREECH, post placenta, MVP 6.5 cm,  g at 72%, no fetal anomalies detected, large retroplacental fibroid 9.9 cm x 7.6 cm x 7.8 cm (not changed significantly). Repeat u/s in 4 wks to sara fibroid and fetal growth. Plan: Growth u/s every 4wks (next ~10/12/19) and weekly BPP/NST at 36 wks (19) due to fibroid and AMA.      1st trimester screening MFM: post placenta, nl nuchal translucency, fundal fibroid measuring 92 x 61 x 75 mm, cervix appears closed, fetal nasal bone visualized. Serial growth u/s every 6 weeks starting at 18 weeks (~19) at [facility] [place].        Uterine fibroid: 19 [facility]: single IUP,  bpm, nl fluid, nl ovaries, left corpus luteum cyst, 6.7 cm exophytic subserosal fibroid.  19 [facility]: Single IUP,  bpm, large subserosal leiomyoma 7.9 x 6.7 x 6 cm, nl amniotic fluid.  19 [facility]: single IUP, large subserosal uterine  fibroid measuring 7cm x 6 x 6.7 cm,  bpm, nl amniotic fluid, nl ovaries.  [facility]  8/2/19 MFM: fundal uterine myoma measuring 9.2 cm in max diameter.      Subserous leiomyoma of uterus 05/09/2019     Priority: Medium     12/2/19 MFM: Cephalic, post placenta, MVP 6.4 cm, EFW 2966 g at 59%, no fetal anomalies detected, BPP 8/8, uterine fibroid appears stable in size. Plan: Weekly BPP at [facility]. APPT: 12/13/19, 12/18/19, and 12/23/19.    10/21/19 MFM: BREECH, post placenta, EFW 1695 g at 66%, no fetal anomalies detected, large retroplacental fibroid measuring 83 mm x 74 mm x 72 mm.   PLAN: Repeat growth u/s in 6 wks and will begin weekly BPP then due to AMA 38 yo at [facility]. APPT: 12/2/19.    9/16/19 MFM: BREECH, post placenta, MVP 6.5 cm,  g at 72%, no fetal anomalies detected, large retroplacental fibroid 9.9 cm x 7.6 cm x 7.8 cm (not changed significantly). Repeat u/s in 4 wks to sara fibroid and fetal growth. Plan: Growth u/s every 4wks (next ~10/12/19) and weekly BPP/NST at 36 wks (12/1/19) due to fibroid and AMA.    Uterine fibroid: 5/9/19 [facility]: single IUP,  bpm, nl fluid, nl ovaries, left corpus luteum cyst, 6.7 cm exophytic subserosal fibroid.  5/20/19 [facility]: Single IUP,  bpm, large subserosal leiomyoma 7.9 x 6.7 x 6 cm, nl amniotic fluid.  6/4/19 [facility]: single IUP, large subserosal uterine fibroid measuring 7cm x 6 x 6.7 cm,  bpm, nl amniotic fluid, nl ovaries.  6/18/19 seen by Dr Nichols at [facility]  8/2/19 MFM: fundal uterine myoma measuring 9.2 cm in max diameter.      Redness of right eye 11/01/2018     Priority: Medium    Chronic right-sided low back pain without sciatica 11/01/2018     Priority: Medium    Vitamin D deficiency 11/01/2018     Priority: Medium    Latent tuberculosis 05/19/2016     Priority: Medium     Overview:     05/05/16- Positive TB Gold test, Referred to [facility]  05/12/16 - Negative CXR at [place]  "[facility]  03/31/17 - Confirmed date of [facility]      Cystic acne 02/03/2011     Priority: Medium     Overview:   Cystic acne with scarring       There are no exam notes on file for this visit.  Chief Complaint   Patient presents with    Ear Problem     Not as much pain as it was on Monday 1/29/24 however both ears are very muffled and patient has a hard time hearing. Also noticeable discharge from the ears - a light Yellow colored fluid.      Blood pressure 115/71, pulse 73, temperature 97.8  F (36.6  C), temperature source Tympanic, resp. rate 16, height 1.631 m (5' 4.2\"), weight 62.8 kg (138 lb 6.4 oz), SpO2 97%, not currently breastfeeding.  No results found for any visits on 02/01/24.  Patient was here Monday and diagnosed with bilateral otitis media given Augmentin and ibuprofen  The pain is improved but she still has diminished hearing and is concerned about this.  She is experienced some drainage.  Hearing problem is bilateral.  Her eyes are better.  Objective left TM is not red but has thick yellow serous fluid behind the tympanic membrane  Right TM still has evidence of serous otitis media.  Not as thick.  No erythema.  No evidence of perforated TM..  Assessment  Resolving otitis media with residual otitis media with effusion  Plan: I reassured her that she is getting better and that it is normal for this to take days to weeks to resolve completely.  I had her try some Valsalva maneuvers to see if we can open up her eustachian tube.  I encouraged her to finish and finish the Augmentin she was given and follow-up if not getting better in 2 weeks.  Could consider ENT referral for PE tubes if her symptoms persist.  Handout given regarding otitis media with effusion    "

## 2024-02-01 NOTE — TELEPHONE ENCOUNTER
[facility] phone call message-patient reporting a symptom:     Symptom: Pt is still having ear pain.  It is getting worse even with the medication.  She would like to speak with a doctor or a nurse and see what she should do.    Same Day Visit Offered: no    Additional comments: none    OK to leave message on voice mail? Yes    Primary language: English      needed? No    Call taken on February 1, 2024 at 8:49 AM by Cristopher Saba

## 2024-02-08 ENCOUNTER — TELEPHONE (OUTPATIENT)
Dept: FAMILY MEDICINE | Facility: CLINIC | Age: 44
End: 2024-02-08

## 2024-02-08 ENCOUNTER — OFFICE VISIT (OUTPATIENT)
Dept: FAMILY MEDICINE | Facility: CLINIC | Age: 44
End: 2024-02-08
Payer: COMMERCIAL

## 2024-02-08 VITALS
TEMPERATURE: 98.2 F | OXYGEN SATURATION: 96 % | SYSTOLIC BLOOD PRESSURE: 103 MMHG | BODY MASS INDEX: 23.72 KG/M2 | HEIGHT: 65 IN | HEART RATE: 78 BPM | WEIGHT: 142.4 LBS | RESPIRATION RATE: 16 BRPM | DIASTOLIC BLOOD PRESSURE: 69 MMHG

## 2024-02-08 DIAGNOSIS — H65.93 BILATERAL NON-SUPPURATIVE OTITIS MEDIA: Primary | ICD-10-CM

## 2024-02-08 PROCEDURE — 99213 OFFICE O/P EST LOW 20 MIN: CPT | Performed by: FAMILY MEDICINE

## 2024-02-08 RX ORDER — CEFDINIR 300 MG/1
300 CAPSULE ORAL DAILY
Qty: 10 CAPSULE | Refills: 0 | Status: SHIPPED | OUTPATIENT
Start: 2024-02-08 | End: 2024-03-08

## 2024-02-08 RX ORDER — FLUTICASONE PROPIONATE 50 MCG
2 SPRAY, SUSPENSION (ML) NASAL DAILY
Qty: 16 G | Refills: 0 | Status: SHIPPED | OUTPATIENT
Start: 2024-02-08 | End: 2024-03-05

## 2024-02-08 NOTE — TELEPHONE ENCOUNTER
[facility] phone call message-patient reporting a symptom:     Symptom: ear pain     Same Day Visit Offered: was seen has not improved      Additional comments:     OK to leave message on voice mail? Yes    Primary language: English      needed? No    Call taken on February 8, 2024 at 10:27 AM by Damaso Gonzalez

## 2024-02-08 NOTE — PATIENT INSTRUCTIONS
"Seen late January for LEFT ear infection.  Took Augmentin.  Hearing decreased now on both sides.  Hard to hear others at work.  Sleeping OK, but has \"whush\" sounds    Exam:  LEFT middle ear infection not clear. TM red. CLEAR middle ear fluid on RIGHT.     1) Try different antibiotic: cefdinir.  TAKE with FOOD  2) Use nasal spray ONCE a DAY until hearing returns and ear symptoms clear.    May take 2-3 weeks.  May appointment to see me in 4 weeks; cancel appointment if clears  "

## 2024-02-08 NOTE — TELEPHONE ENCOUNTER
Spoke with pt regarding symptoms. Pt has been evaluated twice in the clinic for ear pain, drainage and hearing difficulties. Pt states she is not improving and is having trouble working because she cannot hear people speaking. Pt states she is still having pain and maybe some small amounts of drainage. Pt desires to be evaluated today in clinic. Scheduled pt for clinic visit at 12:30 with Dr Garner.  Britta, RN, BSN

## 2024-02-08 NOTE — PROGRESS NOTES
"  Assessment & Plan     Bilateral non-suppurative otitis media  Seen late January for LEFT ear infection.  Took Augmentin.  Hearing decreased now on both sides.  Hard to hear others at work.  Sleeping OK, but has \"whush\" sounds    Exam:  LEFT middle ear infection not clear. TM red. CLEAR middle ear fluid on RIGHT.     1) Try different antibiotic: cefdinir.  TAKE with FOOD  2) Use nasal spray ONCE a DAY until hearing returns and ear symptoms clear.    May take 2-3 weeks.  May appointment to see me in 4 weeks; cancel appointment if clears  - fluticasone (FLONASE) 50 MCG/ACT nasal spray; Spray 2 sprays into both nostrils daily Until ear symptom clear and hearing returns to normal  - cefdinir (OMNICEF) 300 MG capsule; Take 1 capsule (300 mg) by mouth daily                  No follow-ups on file.    Kailash Zabala is a 43 year old, presenting for the following health issues:  hearing loss (Per pt states that she is losing hearing in both ears- worse in the L ear)      2/8/2024    12:37 PM   Additional Questions   Roomed by Benjie   Accompanied by self     HPI     Seen late January for LEFT ear infection.  Took Augmentin.  Hearing decreased now on both sides.  Hard to hear others at work.  Sleeping OK, but has \"whush\" sounds      1) Try different antibiotic: cefdinir.  TAKE with FOOD  2) Use nasal spray ONCE a DAY until hearing returns and ear symptoms clear.    May take 2-3 weeks.  May appointment to see me in 4 weeks; cancel appointment if clears      Objective    /69   Pulse 78   Temp 98.2  F (36.8  C) (Oral)   Resp 16   Ht 1.638 m (5' 4.5\")   Wt 64.6 kg (142 lb 6.4 oz)   LMP 12/10/2022   SpO2 96%   BMI 24.07 kg/m    Body mass index is 24.07 kg/m .  Physical Exam   GENERAL: alert and no distress  NECK: no adenopathy, no asymmetry, masses, or scars  LEFT middle ear infection not cleared. TM red. CLEAR middle ear fluid on RIGHT.   RESP: lungs clear to auscultation - no rales, rhonchi or wheezes  CV: " regular rate and rhythm, normal S1 S2          Signed Electronically by: Mariano Garner MD

## 2024-02-25 ENCOUNTER — HEALTH MAINTENANCE LETTER (OUTPATIENT)
Age: 44
End: 2024-02-25

## 2024-03-01 DIAGNOSIS — H65.93 BILATERAL NON-SUPPURATIVE OTITIS MEDIA: ICD-10-CM

## 2024-03-05 RX ORDER — FLUTICASONE PROPIONATE 50 MCG
2 SPRAY, SUSPENSION (ML) NASAL DAILY
Qty: 48 ML | Refills: 1 | Status: SHIPPED | OUTPATIENT
Start: 2024-03-05

## 2024-03-08 ENCOUNTER — OFFICE VISIT (OUTPATIENT)
Dept: FAMILY MEDICINE | Facility: CLINIC | Age: 44
End: 2024-03-08
Payer: COMMERCIAL

## 2024-03-08 VITALS
HEART RATE: 67 BPM | WEIGHT: 142.2 LBS | OXYGEN SATURATION: 100 % | DIASTOLIC BLOOD PRESSURE: 67 MMHG | RESPIRATION RATE: 18 BRPM | SYSTOLIC BLOOD PRESSURE: 105 MMHG | HEIGHT: 65 IN | TEMPERATURE: 97.9 F | BODY MASS INDEX: 23.69 KG/M2

## 2024-03-08 DIAGNOSIS — H69.92 DYSFUNCTION OF LEFT EUSTACHIAN TUBE: Primary | ICD-10-CM

## 2024-03-08 DIAGNOSIS — G89.29 CHRONIC LEFT-SIDED LOW BACK PAIN WITHOUT SCIATICA: ICD-10-CM

## 2024-03-08 DIAGNOSIS — M54.50 CHRONIC LEFT-SIDED LOW BACK PAIN WITHOUT SCIATICA: ICD-10-CM

## 2024-03-08 DIAGNOSIS — E55.9 VITAMIN D DEFICIENCY: ICD-10-CM

## 2024-03-08 PROCEDURE — 99214 OFFICE O/P EST MOD 30 MIN: CPT | Performed by: FAMILY MEDICINE

## 2024-03-08 RX ORDER — CHOLECALCIFEROL (VITAMIN D3) 50 MCG
1 TABLET ORAL DAILY
Qty: 100 TABLET | Refills: 3 | Status: SHIPPED | OUTPATIENT
Start: 2024-03-08

## 2024-03-08 NOTE — PATIENT INSTRUCTIONS
"Ears better. Would like me to check them.  Wondering about \"Blood Type\"  Ongoing Low back pain. More on left side.   Pretty much constant throughout day.   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  Sits more at current job.  Has access to her work's fitness room.    THREE times a week  1) Walk or elliptical for 15 minutes  2) Back plank: hold for 10 seconds; rest for 30 seconds. Do 4 times.    3) Back lift: Same as #2        3)  "

## 2024-03-08 NOTE — PROGRESS NOTES
"  Assessment & Plan     Dysfunction of left eustachian tube  Ears better. Would like me to check them.  Wondering about \"Blood Type\"      Chronic left-sided low back pain without sciatica  Ongoing Low back pain. More on left side.   Pretty much constant throughout day.   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  Sits more at current job.  Has access to her work's fitness room.    THREE times a week  1) Walk or elliptical for 15 minutes  2) Back plank: hold for 10 seconds; rest for 30 seconds. Do 4 times.    3) Back lift: Same as #2      Vitamin D deficiency    - vitamin D3 (CHOLECALCIFEROL) 50 mcg (2000 units) tablet; Take 1 tablet (50 mcg) by mouth daily      36 minutes spent by me on the date of the encounter doing chart review, history and exam, documentation and further activities per the note        Subjective   Sagrario is a 43 year old, presenting for the following health issues:  RECHECK (EAR ) and Recheck Medication (Talk about MEDS)      3/8/2024     8:19 AM   Additional Questions   Roomed by YU   Accompanied by SELF         3/8/2024    Information    services provided? No     HPI     Ongoing Low back pain. More on left side.   Pretty much constant throughout day.   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  Sits more at current job.  Has access to her work's fitness room.        Objective    /67 (BP Location: Left arm, Patient Position: Sitting, Cuff Size: Adult Regular)   Pulse 67   Temp 97.9  F (36.6  C) (Oral)   Resp 18   Ht 1.65 m (5' 4.96\")   Wt 64.5 kg (142 lb 3.2 oz)   LMP 12/10/2022   SpO2 100%   BMI 23.69 kg/m    Body mass index is 23.69 kg/m .  Physical Exam   GENERAL: alert and no distress  LEFT ear drum retracted. Not red. RIGHT normal  NECK: no adenopathy, no asymmetry, masses, or scars  RESP: lungs clear to auscultation - no rales, rhonchi or wheezes  CV: regular rate " and rhythm, normal S1 S2, no S3 or S4, no murmur, click or rub, no peripheral edema  ABDOMEN: soft, nontender, no hepatosplenomegaly, no masses and bowel sounds normal  MS: Left lumbar muscle spasm and tenderness. Slump test negative. SLR negative. Strength in LE normal. No edema and 2+ DP pulses            Signed Electronically by: Mariano Garner MD

## 2024-03-11 NOTE — PATIENT INSTRUCTIONS
Dear Sagrario Meyer,    It was a pleasure seeing you in clinic today.     -Start taking B6 every 6 hours   -Take Doxyalmine at bedtime  -Eat frequent small meals throughout the day  -Drink lots of fluids, especially water  -Can take tylenol as needed for pain  -take prenatal vitamin every day with a meal  -Go to ultrasound on 5/20/19  -Follow up with Dr. Webb next week after ultrasound    Please do not hesitate to call or return to clinic if you have an questions or concerns.      Sincerely,    Dr. Voss    
What Type Of Note Output Would You Prefer (Optional)?: Bullet Format
Hpi Title: Evaluation of Skin Lesions
How Severe Are Your Spot(S)?: moderate
Have Your Spot(S) Been Treated In The Past?: has not been treated

## 2024-03-15 DIAGNOSIS — H66.003 NON-RECURRENT ACUTE SUPPURATIVE OTITIS MEDIA OF BOTH EARS WITHOUT SPONTANEOUS RUPTURE OF TYMPANIC MEMBRANES: ICD-10-CM

## 2024-03-15 RX ORDER — IBUPROFEN 800 MG/1
800 TABLET, FILM COATED ORAL EVERY 6 HOURS PRN
Qty: 90 TABLET | Refills: 1 | Status: SHIPPED | OUTPATIENT
Start: 2024-03-15

## 2024-03-15 NOTE — TELEPHONE ENCOUNTER
Medication requested: IBUPROFEN 800 MG TABLET   Last office visit: 3/8/24  Future [facility] appointments: none  Medication last refilled: 2/18/24  Last qualifying labs: none    Routing refill request to provider for review/approval because:    NSAID Medications Shhxoz36/15/2024 01:12 AM   Protocol Details Normal ALT on file in past 12 months    Normal AST on file in past 12 months    Normal CBC on file in past 12 months    Always Fail Criteria - Chart Review Required    Normal GFR on file in past 12 months    Normal serum creatinine on file in past 12 months        Britta RN, BSN

## 2024-04-23 NOTE — H&P
ADMIT NOTE  =================  39w3d    Sagrario Meyer is a 39 year old female with an Patient's last menstrual period was 2019 (approximate). and Estimated Date of Delivery: Dec 30, 2019 [facility] at 4:47 PM with for induction of labor.  Indication: AMA at term and polyhydramnios.     HPI  ================  Pt presents for IOL for AMA and polyhydramnios.  Reports occasional contractions. + fetal movement.   Desires CNM care.    Contractions- mild and irregular  Fetal movement- active  ROM- no   Vaginal bleeding- none  GBS- negative  Other labor support- friend and , not currently present    Weight gain- 166 - 130 lbs, Total weight gain- 36 lbs  Height- 64 in  BMI- 22  First prenatal visit at 8 weeks, Total visits- 17  [facility] at 37 weeks, 2 visits with [facility]    PROBLEM LIST  =================  Patient Active Problem List    Diagnosis Date Noted     Labor and delivery, indication for care 2019     Priority: Medium     Supervision of high risk pregnancy [facility] MD care 12/10/2019     Priority: Medium     Multigravida of advanced maternal age in third trimester 12/10/2019     Priority: Medium     AMA (advanced maternal age) multigravida 35+ 2019     Priority: Medium     AMA- 19 MFM: Cephalic, post placenta, MVP 6.4 cm, EFW 2966 g at 59%, no fetal anomalies detected, BPP 8/, uterine fibroid appears stable in size. Plan: Weekly BPP at [facility]. APPT: 19, 19, and 19.    10/21/19 MFM: BREECH, post placenta, EFW 1695 g at 66%, no fetal anomalies detected, large retroplacental fibroid measuring 83 mm x 74 mm x 72 mm.   PLAN: Repeat growth u/s in 6 wks and will begin weekly BPP then due to AMA 38 yo at [facility]. APPT: 19.    19 MFM: BREECH, post placenta, MVP 6.5 cm,  g at 72%, no fetal anomalies detected, large retroplacental fibroid 9.9 cm x 7.6 cm x 7.8 cm (not changed significantly). Repeat u/s in 4 wks to sara fibroid and fetal  growth. Plan: Growth u/s every 4wks (next ~10/12/19) and weekly BPP/NST at 36 wks (12/1/19) due to fibroid and AMA.      1st trimester screening MFM: post placenta, nl nuchal translucency, fundal fibroid measuring 92 x 61 x 75 mm, cervix appears closed, fetal nasal bone visualized. Serial growth u/s every 6 weeks starting at 18 weeks (~7/28/19) at [facility].        Uterine fibroid: 5/9/19 [facility]: single IUP,  bpm, nl fluid, nl ovaries, left corpus luteum cyst, 6.7 cm exophytic subserosal fibroid.  5/20/19 [facility]: Single IUP,  bpm, large subserosal leiomyoma 7.9 x 6.7 x 6 cm, nl amniotic fluid.  6/4/19 [facility]: single IUP, large subserosal uterine fibroid measuring 7cm x 6 x 6.7 cm,  bpm, nl amniotic fluid, nl ovaries.  6/18/19 seen by Dr Nichols at [facility]  8/2/19 MFM: fundal uterine myoma measuring 9.2 cm in max diameter.       Subserous leiomyoma of uterus 05/09/2019     Priority: Medium     12/2/19 MFM: Cephalic, post placenta, MVP 6.4 cm, EFW 2966 g at 59%, no fetal anomalies detected, BPP 8/8, uterine fibroid appears stable in size. Plan: Weekly BPP at [facility]. APPT: 12/13/19, 12/18/19, and 12/23/19.    10/21/19 MFM: BREECH, post placenta, EFW 1695 g at 66%, no fetal anomalies detected, large retroplacental fibroid measuring 83 mm x 74 mm x 72 mm.   PLAN: Repeat growth u/s in 6 wks and will begin weekly BPP then due to AMA 40 yo at [facility]. APPT: 12/2/19.    9/16/19 MFM: BREECH, post placenta, MVP 6.5 cm,  g at 72%, no fetal anomalies detected, large retroplacental fibroid 9.9 cm x 7.6 cm x 7.8 cm (not changed significantly). Repeat u/s in 4 wks to sara fibroid and fetal growth. Plan: Growth u/s every 4wks (next ~10/12/19) and weekly BPP/NST at 36 wks (12/1/19) due to fibroid and AMA.    Uterine fibroid: 5/9/19 [facility]: single IUP,  bpm, nl fluid, nl ovaries, left corpus luteum cyst, 6.7 cm exophytic subserosal fibroid.  5/20/19 [facility]: Single IUP,  bpm, large subserosal leiomyoma 7.9 x  6.7 x 6 cm, nl amniotic fluid.  6/4/19 [facility]: single IUP, large subserosal uterine fibroid measuring 7cm x 6 x 6.7 cm,  bpm, nl amniotic fluid, nl ovaries.  6/18/19 seen by Dr Nichols at [facility]  8/2/19 MFM: fundal uterine myoma measuring 9.2 cm in max diameter.       Redness of right eye 11/01/2018     Priority: Medium     Chronic right-sided low back pain without sciatica 11/01/2018     Priority: Medium     Vitamin D deficiency 11/01/2018     Priority: Medium     Latent tuberculosis 05/19/2016     Priority: Medium     Overview:     05/05/16- Positive TB Gold test, Referred to [facility]  05/12/16 - Negative CXR at [facility]  03/31/17 - Confirmed date of completion of 9 months therapy with Isoniazid at [facility]       Cystic acne 02/03/2011     Priority: Medium     Overview:   Cystic acne with scarring         HISTORIES  ============  Allergies   Allergen Reactions     Peanuts [Nuts]      acne     Past Medical History:   Diagnosis Date     Latent tuberculosis 2016    received 9 months of INH     Past Surgical History:   Procedure Laterality Date     left leg abscess  1988    [facility]      BREAST CYST ASP ADDL LEFT Left 2004    [facility]   .  Family History   Problem Relation Age of Onset     Hypertension Mother      No Known Problems Father      Hypertension Sister      No Known Problems Brother      Hypertension Maternal Grandmother      No Known Problems Brother      No Known Problems Paternal Grandfather      No Known Problems Son      No Known Problems Daughter      No Known Problems Maternal Half-Brother      No Known Problems Maternal Half-Sister      No Known Problems Paternal Half-Brother      No Known Problems Paternal Half-Sister      No Known Problems Niece      No Known Problems Nephew      No Known Problems Cousin      No Known Problems Other      Coronary Artery Disease No family hx of      Other Cancer No family hx  of      Diabetes No family hx of      Cancer No family hx of      Heart Disease No family hx of      Hyperlipidemia No family hx of      Kidney Disease No family hx of      Cerebrovascular Disease No family hx of      Obesity No family hx of      Thrombosis No family hx of      Asthma No family hx of      Arthritis No family hx of      Thyroid Disease No family hx of      Depression No family hx of      Mental Illness No family hx of      Substance Abuse No family hx of      Cystic Fibrosis No family hx of      Early Death No family hx of      Coronary Artery Disease Early Onset No family hx of      Heart Failure No family hx of      Bleeding Diathesis No family hx of      Dementia No family hx of      Breast Cancer No family hx of      Ovarian Cancer No family hx of      Uterine Cancer No family hx of      Prostate Cancer No family hx of      Colorectal Cancer No family hx of      Pancreatic Cancer No family hx of      Lung Cancer No family hx of      Melanoma No family hx of      Autoimmune Disease No family hx of      Unknown/Adopted No family hx of      Genetic Disorder No family hx of      Social History     Tobacco Use     Smoking status: Never Smoker     Smokeless tobacco: Never Used   Substance Use Topics     Alcohol use: Never     Frequency: Never     Comment: occ wine     OB History    Para Term  AB Living   1 0 0 0 0 0   SAB TAB Ectopic Multiple Live Births   0 0 0 0 0      # Outcome Date GA Lbr Jasmeet/2nd Weight Sex Delivery Anes PTL Lv   1 Current                 LABS:   ===========  Prenatal Labs:  Rhogam not indicated   Lab Results   Component Value Date    HGB 11.1 (L) 10/28/2019     Rubella immune  GBS neg  Other labs:  No results found for this or any previous visit (from the past 24 hour(s)).    ROS  =========  Pt denies significant respiratory, cardiovacular, GI, or muscular/skeletalcomplaints.    See RN data base ROS.     PHYSICAL EXAM:  ===============  /76   Temp 98.3  F  (36.8  C) (Oral)   Resp 18   LMP 03/25/2019 (Approximate)   General appearance: comfortable  GENERAL APPEARANCE: healthy, alert and no distress  RESP: normal respiratory effort  CV: regular rates and rhythm  ABDOMEN:  soft, nontender, no epigastric pain  SKIN: no suspicious lesions or rashes  NEURO: Denies headache, blurred vision, other vision changes  PSYCH: mentation appears normal. and affect normal/bright  Legs: Reflexes normal bilaterally     Abdomen: gravid, vertex fetus per Leopold's, non-tender between contractions.   Cephalic presentation confirmed by BSUS  EFW-  7.75-8 lbs.   CONTRACTIONS: irreg, mild and every 1-6 minutes  FETAL HEART TONES: continuous EFM- baseline 150 with moderate variability and positive accelerations. No decelerations.  PELVIC EXAM: external FT, internal os closed/ 30%/ Posterior/ average/ -2   DAUGHERTY SCORE: 2  BLOODY SHOW: no   ROM: no  FLUID: none  AMNISURE: not done    ASSESSMENT:  ==============  IUP @ 39w3d admitted for induction of labor.  Indication: AMA at term, poly   NST NOT DONE  Fetal Heart Rate - category one  GBS- negative    Patient Active Problem List   Diagnosis     Cystic acne     Latent tuberculosis     Redness of right eye     Chronic right-sided low back pain without sciatica     Vitamin D deficiency     AMA (advanced maternal age) multigravida 35+     Subserous leiomyoma of uterus     Supervision of high risk pregnancy [facility] care     Multigravida of advanced maternal age in third trimester     Labor and delivery, indication for care     PLAN:  ===========  Admit - see IP orders  Pain medication and non-pharmacologic options reviewed. Pt undecided at this time.   Reviewed induction process, cervical ripening, Pitocin, AROM, etc.   Cervical ripening with Misoprostol vaginal reviewed with pt. Agreeable to plan.  Ambulation, hydration, position changes, birthing ball and tub options to facilitate labor reviewed with pt .  ASHLEY Anna CNM     left lower extremity findings

## 2024-10-10 ENCOUNTER — PATIENT OUTREACH (OUTPATIENT)
Dept: CARE COORDINATION | Facility: CLINIC | Age: 44
End: 2024-10-10
Payer: COMMERCIAL

## 2024-10-24 ENCOUNTER — PATIENT OUTREACH (OUTPATIENT)
Dept: CARE COORDINATION | Facility: CLINIC | Age: 44
End: 2024-10-24
Payer: COMMERCIAL

## 2025-01-12 ENCOUNTER — HEALTH MAINTENANCE LETTER (OUTPATIENT)
Age: 45
End: 2025-01-12

## (undated) DEVICE — SOL WATER IRRIG 1000ML BOTTLE 07139-09

## (undated) DEVICE — GLOVE ESTEEM POWDER FREE SMT 6.5  2D72PT65

## (undated) DEVICE — GLOVE PROTEXIS BLUE W/NEU-THERA 7.0  2D73EB70

## (undated) DEVICE — STRAP KNEE/BODY 31143004

## (undated) DEVICE — SU VICRYL 0 CT-1 36" J346H

## (undated) DEVICE — SUCTION CANISTER MEDIVAC LINER 1500ML W/LID 65651-515

## (undated) DEVICE — STOCKING SLEEVE COMPRESSION CALF LG

## (undated) DEVICE — BASIN SET MAJOR

## (undated) DEVICE — SU MONOCRYL 0 CT-1 36" Y346H

## (undated) DEVICE — SPONGE LAP 18X18" X8435

## (undated) DEVICE — CATH TRAY FOLEY SURESTEP 16FR WDRAIN BAG STLK LATEX A300316A

## (undated) DEVICE — PREP CHLORAPREP 26ML TINTED ORANGE  260815

## (undated) DEVICE — SOL NACL 0.9% IRRIG 1000ML BOTTLE 07138-09

## (undated) DEVICE — PACK C-SECTION LF PL15OTA83B

## (undated) DEVICE — SU VICRYL 4-0 PS-2 18" UND J496G

## (undated) RX ORDER — FENTANYL CITRATE 50 UG/ML
INJECTION, SOLUTION INTRAMUSCULAR; INTRAVENOUS
Status: DISPENSED
Start: 2019-12-31

## (undated) RX ORDER — ONDANSETRON 2 MG/ML
INJECTION INTRAMUSCULAR; INTRAVENOUS
Status: DISPENSED
Start: 2019-12-31

## (undated) RX ORDER — PHENYLEPHRINE HCL IN 0.9% NACL 1 MG/10 ML
SYRINGE (ML) INTRAVENOUS
Status: DISPENSED
Start: 2019-12-31

## (undated) RX ORDER — MORPHINE SULFATE 1 MG/ML
INJECTION, SOLUTION EPIDURAL; INTRATHECAL; INTRAVENOUS
Status: DISPENSED
Start: 2019-12-31

## (undated) RX ORDER — KETOROLAC TROMETHAMINE 30 MG/ML
INJECTION, SOLUTION INTRAMUSCULAR; INTRAVENOUS
Status: DISPENSED
Start: 2019-12-31